# Patient Record
Sex: MALE | Race: WHITE | NOT HISPANIC OR LATINO | Employment: OTHER | URBAN - METROPOLITAN AREA
[De-identification: names, ages, dates, MRNs, and addresses within clinical notes are randomized per-mention and may not be internally consistent; named-entity substitution may affect disease eponyms.]

---

## 2019-03-04 ENCOUNTER — HOSPITAL ENCOUNTER (EMERGENCY)
Facility: HOSPITAL | Age: 54
Discharge: HOME | End: 2019-03-04
Attending: EMERGENCY MEDICINE | Admitting: EMERGENCY MEDICINE
Payer: MEDICARE

## 2019-03-04 ENCOUNTER — APPOINTMENT (EMERGENCY)
Dept: CT IMAGING | Facility: HOSPITAL | Age: 54
End: 2019-03-04
Payer: MEDICARE

## 2019-03-04 VITALS
OXYGEN SATURATION: 97 % | TEMPERATURE: 97.2 F | SYSTOLIC BLOOD PRESSURE: 123 MMHG | DIASTOLIC BLOOD PRESSURE: 67 MMHG | HEART RATE: 99 BPM | RESPIRATION RATE: 18 BRPM | WEIGHT: 201.94 LBS

## 2019-03-04 DIAGNOSIS — M54.2 ACUTE NECK PAIN: ICD-10-CM
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
## 2019-03-04 DIAGNOSIS — S09.90XA MINOR CLOSED HEAD INJURY: ICD-10-CM

## 2019-03-04 PROCEDURE — 70450 CT HEAD/BRAIN W/O DYE: CPT

## 2019-03-04 PROCEDURE — 72125 CT NECK SPINE W/O DYE: CPT

## 2019-03-04 PROCEDURE — 99284 EMERGENCY DEPT VISIT MOD MDM: CPT

## 2019-03-04 RX ORDER — OLANZAPINE 5 MG/1
5 TABLET ORAL
COMMUNITY
End: 2020-03-09

## 2019-03-04 RX ORDER — CLONAZEPAM 0.5 MG/1
0.5 TABLET ORAL
COMMUNITY
End: 2019-08-01

## 2019-03-04 RX ORDER — OLANZAPINE 10 MG/1
10 TABLET ORAL
COMMUNITY

## 2019-03-04 RX ORDER — OLANZAPINE 2.5 MG/1
5 TABLET ORAL
COMMUNITY
End: 2020-03-09

## 2019-03-04 RX ORDER — MIRTAZAPINE 7.5 MG/1
7.5 TABLET, FILM COATED ORAL
COMMUNITY
End: 2022-04-04

## 2019-03-04 RX ORDER — DULOXETIN HYDROCHLORIDE 60 MG/1
60 CAPSULE, DELAYED RELEASE ORAL DAILY
COMMUNITY
End: 2020-03-09

## 2019-03-04 RX ORDER — ACETAMINOPHEN 325 MG/1
650 TABLET ORAL EVERY 4 HOURS PRN
COMMUNITY

## 2019-03-04 RX ORDER — DESONIDE 0.5 MG/G
CREAM TOPICAL DAILY
COMMUNITY
End: 2020-03-09

## 2019-03-04 RX ORDER — CHLORPROMAZINE HYDROCHLORIDE 25 MG/1
200 TABLET, FILM COATED ORAL
COMMUNITY

## 2019-03-04 RX ORDER — CLONAZEPAM 0.5 MG/1
0.25 TABLET ORAL EVERY 12 HOURS
COMMUNITY
End: 2022-08-15

## 2019-03-04 RX ORDER — CICLOPIROX 1 G/100ML
SHAMPOO TOPICAL 3 TIMES WEEKLY
COMMUNITY

## 2019-03-04 NOTE — ED PROVIDER NOTES
History  Chief Complaint   Patient presents with   [facility] Fall     pt arrives via EMS post fall after trying to transfer from bed to wheel chair  pt hit head  no LOC     50-year-old male presents from [facility] for the evaluation of injuries that may have been sustained after a fall  The patient resides at [facility] and was transitioned to his wheelchair when he fell  Nursing assistant heard a thump and felt the patient on the ground  There is no reported loss of consciousness  There is no seizure activity  The patient is complaining of some mild head and neck pain  Nothing makes his symptoms better or worse  Prior to Admission Medications   Prescriptions Last Dose Informant Patient Reported? Taking?    Ciclopirox 1 % shampoo   Yes Yes   Sig: Apply topically 3 (three) times a week   DULoxetine (CYMBALTA) 60 mg delayed release capsule   Yes Yes   Sig: Take 60 mg by mouth daily   OLANZapine (ZyPREXA) 10 mg tablet   Yes Yes   Sig: Take 10 mg by mouth daily at bedtime   OLANZapine (ZyPREXA) 2 5 mg tablet   Yes Yes   Sig: Take 5 mg by mouth daily at bedtime   OLANZapine (ZyPREXA) 5 mg tablet   Yes Yes   Sig: Take 5 mg by mouth daily at bedtime   chlorproMAZINE (THORAZINE) 25 mg tablet   Yes Yes   Sig: Take 25 mg by mouth daily at bedtime   clonazePAM (KlonoPIN) 0 5 mg tablet   Yes Yes   Sig: Take 0 5 mg by mouth daily at bedtime 3 tablets at bedtime   clonazePAM (KlonoPIN) 0 5 mg tablet   Yes Yes   Sig: Take 0 5 mg by mouth daily at bedtime   desonide (DESOWEN) 0 05 % cream   Yes Yes   Sig: Apply topically daily Apply to face daily   mirtazapine (REMERON) 7 5 MG tablet   Yes Yes   Sig: Take 7 5 mg by mouth daily at bedtime      Facility-Administered Medications: None       Past Medical History:   Diagnosis Date    Ataxia     Bipolar 1 disorder ([facility] )     Depression     Diplopia     Dysarthria     Hypertension     Insomnia     Onychomycosis     Optic atrophy     [facility] )     Quadriplegia ([facility] )        History reviewed  No pertinent surgical history  History reviewed  No pertinent family history  I have reviewed and agree with the history as documented  Social History     Tobacco Use    Smoking status: Never Smoker   Substance Use Topics    Alcohol use: Not Currently     Frequency: Never    Drug use: Never        Review of Systems   Constitutional: Negative for chills and fever  Respiratory: Negative for shortness of breath  Cardiovascular: Negative for chest pain and palpitations  Musculoskeletal: Positive for neck pain  Neurological: Positive for headaches  Negative for dizziness, seizures and syncope  All other systems reviewed and are negative  Physical Exam  Physical Exam   Constitutional: He is oriented to person, place, and time  He appears well-developed and well-nourished  No distress  HENT:   Head: Normocephalic and atraumatic  Right Ear: External ear normal    Left Ear: External ear normal    Mouth/Throat: No oropharyngeal exudate  Eyes: Pupils are equal, round, and reactive to light  EOM are normal  No scleral icterus  Neck: Normal range of motion  Neck supple  Cardiovascular: Normal rate, regular rhythm and normal heart sounds  Pulmonary/Chest: Effort normal and breath sounds normal  No respiratory distress  Abdominal: Soft  Bowel sounds are normal  There is no tenderness  There is no rebound and no guarding  Neurological: He is alert and oriented to person, place, and time  He exhibits abnormal muscle tone  Chronic weakness from quadriplegia   Skin: Skin is warm and dry  No rash noted  Psychiatric: He has a normal mood and affect  Nursing note and vitals reviewed        Vital Signs  ED Triage Vitals [03/04/19 0301]   Temperature Pulse Respirations Blood Pressure SpO2   (!) 97 2 °F (36 2 °C) 68 18 117/66 97 %      Temp Source Heart Rate Source Patient Position - Orthostatic VS BP Location FiO2 (%) Temporal Monitor Lying Right arm --      Pain Score       2           Vitals:    03/04/19 0301   BP: 117/66   Pulse: 68   Patient Position - Orthostatic VS: Lying       Visual Acuity      ED Medications  Medications - No data to display    Diagnostic Studies  Results Reviewed     None                 CT cervical spine without contrast   Final Result by Mariana Huang MD (03/04 0350)      No cervical spine fracture or traumatic malalignment  Workstation performed: [id number]         CT head without contrast   Final Result by Mariana Huang MD (03/04 0347)   7 mm hypodensity in the right hemipons likely reflects volume inspiration however a developing infarct cannot be excluded and if indicated clinically, an MRI is recommended for further evaluation  No priors are available for comparison           Otherwise, no acute intracranial abnormality  I personally discussed this study with Murali Cameron on 3/4/2019 at 3:47 AM                      Workstation performed: [id number]                    Procedures  Procedures       Phone Contacts  ED Phone Contact    ED Course  ED Course as of Mar 04 0400   Mon Mar 04, 2019   7579 CT results noted  I do not clinically suspect stroke  Patient has no change in his baseline mental or physical exam   The plan is to return the patient to [facility] as there is no acute traumatic injury  MDM  Number of Diagnoses or Management Options  Accidental fall from bed, initial encounter: new and requires workup  Acute neck pain: new and requires workup  Minor closed head injury: new and requires workup  Diagnosis management comments: The plan is to obtain CTs of the head and cervical spine to rule out clinically significant traumatic injury  The patient (and any family present) verbalized understanding of the discharge instructions and warnings that would necessitate return to the Emergency Department      All questions were answered prior to discharge  Amount and/or Complexity of Data Reviewed  Tests in the radiology section of CPT®: ordered and reviewed  Review and summarize past medical records: yes  Discuss the patient with other providers: yes  Independent visualization of images, tracings, or specimens: yes        Disposition  Final diagnoses:   Accidental fall from bed, initial encounter   Minor closed head injury   Acute neck pain     Time reflects when diagnosis was documented in both MDM as applicable and the Disposition within this note     Time User Action Codes Description Comment    3/4/2019  3:59 AM Hermann Celaya B Add [W06  XXXA] Accidental fall from bed, initial encounter     3/4/2019  3:59 AM Marionette Malagasy B Add [S00 90XA] Minor closed head injury     3/4/2019  3:59 AM Marionette Malagasy B Add [M54 2] Acute neck pain       ED Disposition     ED Disposition Condition Date/Time Comment    Discharge Stable Mon Mar 4, 2019  3:59 AM Jasmyn Mills discharge to [facility]            Follow-up Information    None         Patient's Medications   Discharge Prescriptions    No medications on file     No discharge procedures on file      ED Provider  Electronically Signed by           Donna Magaña DO  03/04/19 0404

## 2019-07-02 ENCOUNTER — NURSING HOME VISIT (OUTPATIENT)
Dept: GERIATRICS | Facility: OTHER | Age: 54
End: 2019-07-02
Payer: MEDICARE

## 2019-07-02 DIAGNOSIS — B07.8 COMMON WART: Primary | ICD-10-CM

## 2019-07-02 PROBLEM — I46.9 CARDIAC ARREST WITH SUCCESSFUL RESUSCITATION (HCC): Status: ACTIVE | Noted: 2019-07-02

## 2019-07-02 PROBLEM — I10 ESSENTIAL HYPERTENSION: Status: ACTIVE | Noted: 2019-07-02

## 2019-07-02 PROBLEM — G32.81 CEREBELLAR ATAXIA IN DISEASES CLASSIFIED ELSEWHERE (HCC): Status: ACTIVE | Noted: 2019-07-02

## 2019-07-02 PROBLEM — Q04.8: Status: ACTIVE | Noted: 2019-07-02

## 2019-07-02 PROBLEM — G93.89: Status: ACTIVE | Noted: 2019-07-02

## 2019-07-02 PROBLEM — G93.1 CHRONIC ANOXIC ENCEPHALOPATHY (HCC): Status: ACTIVE | Noted: 2019-07-02

## 2019-07-02 PROBLEM — F31.12 BIPOLAR 1 DISORDER WITH MODERATE MANIA (HCC): Status: ACTIVE | Noted: 2019-07-02

## 2019-07-02 PROCEDURE — 99308 SBSQ NF CARE LOW MDM 20: CPT | Performed by: FAMILY MEDICINE

## 2019-07-02 NOTE — PROGRESS NOTES
[address]  Long Term      NAME: Arlene Meraz  AGE: 53 y o  SEX: male [phone]    DATE OF ENCOUNTER: 7/2/2019    Code status:  CPR    Assessment and Plan   Common wart  Second and 3rd PIP of right hand treated with compound W maximum Strength daily hold if lesion becomes painful      All medications and routine orders were reviewed and updated as needed        Chief Complaint     Seen for lesions on the right 2nd and 3rd knuckles patient states that he has had these for a long time but has decided that there are new since and wants them removed    Objective     Vital signs none:      PHYSICAL EXAM:  GENERAL: no acute distress,  SKIN: warm, dry, no rash, no cyanosis, right hand 2nd and 3rd DD AP have a common wart with the black vesicles seen there are large in thickened

## 2019-07-02 NOTE — ASSESSMENT & PLAN NOTE
Second and 3rd PIP of right hand treated with compound W maximum Strength daily hold if lesion becomes painful

## 2019-08-01 ENCOUNTER — NURSING HOME VISIT (OUTPATIENT)
Dept: GERIATRICS | Facility: OTHER | Age: 54
End: 2019-08-01
Payer: MEDICARE

## 2019-08-01 DIAGNOSIS — F31.12 BIPOLAR 1 DISORDER WITH MODERATE MANIA (HCC): Primary | ICD-10-CM

## 2019-08-01 PROCEDURE — 99309 SBSQ NF CARE MODERATE MDM 30: CPT | Performed by: FAMILY MEDICINE

## 2019-08-01 NOTE — PROGRESS NOTES
[address]  Long Term      NAME: Simeon Greenberg  AGE: 53 y o  SEX: male [id number]    DATE OF ENCOUNTER: 8/1/2019    Code status:  CPR    Assessment and Plan   Chronic anoxic encephalopathy ([facility] [address] )  No change in mental status    Bipolar 1 disorder with moderate tavo ([facility] )  Well controlled with the addition of Depakote an chlorpromazine and clonazepam, duloxetine, Olanzapine per Psychiatry    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 [facility])  Wheelchair bound unable to walk    Essential hypertension  93/58 while his antihypertensive have been weaned  Common wart  Improving with compound W 17% , have asked him to use nail file to remove some of the callous      All medications and routine orders were reviewed and updated as needed  Chief Complaint     Patient is seen for his regular 2 month check with review of his medications and any new complaints that the patient may have  At this time he has no complaints his bipolar 1 with tavo has resolved with the addition of the chlorpromazine and the valproic acid  He is presently treating her warts on the 2nd and 3rd fingers of his right hand which are improving with compound W 17%  He has no other complaints , sleeping is still desturb per patient but when monitor show adequate sleep  Historical Information   Past Medical History:   Diagnosis Date    Ataxia     Bipolar 1 disorder ([facility] )     Depression     Diplopia     Dysarthria     Hypertension     Insomnia     Onychomycosis     Optic atrophy     [facility]     Quadriplegia ([facility] )      No past surgical history on file  Social History   No family history on file  Review of Systems     Review of Systems   Constitutional: Negative for activity change, chills, fatigue, fever and unexpected weight change  HENT: Negative  Eyes: Negative  Respiratory: Negative  Cardiovascular: Negative  Gastrointestinal: Negative      Endocrine: Negative  Genitourinary: Negative  Musculoskeletal: Negative for arthralgias, back pain, gait problem, joint swelling, myalgias and neck pain  Neurological: Negative for syncope, facial asymmetry, speech difficulty, light-headedness and headaches  Hematological: Negative  Psychiatric/Behavioral: Negative for agitation, behavioral problems, confusion, decreased concentration, dysphoric mood, hallucinations, self-injury, sleep disturbance and suicidal ideas  The patient is not nervous/anxious and is not hyperactive  Objective     Vital signs:  BP: 93/58   HR: 94  RR: 16  TEMP: 97 8  SAT  : 96% RA    PHYSICAL EXAM:  GENERAL: no acute distress,flat affect, soft spoken and mild dysarthria   SKIN: warm, dry, no rash, no cyanosis  HEENT: normocephalic, atraumatic, no JVD, no Thyromegaly, no lymphadenopathy  LUNGS: CTA, no wheezing, no rales, expanded equally, no chest tenderness   HEART: normal rhythm, normal rate, no murmur, no gallop  ABDOMEN: soft non tender non distended bs+, no guarding or rebound tenderness  : no suprapubic tenderness  MUSCULOSKELETAL: can self propel wheelchair with arms and some use of legs  strength about 4+/5 upper extremities, no edema  NEUROLOGY: awake, alert, responding tto question,, good ye contact CN2-12 intact  PSYCH: no complaint of depression, pleasant  ? Pertinent Laboratory/Diagnostic Studies: The following labs/studies were reviewed please see chart or hospital paperwork for details

## 2019-08-01 NOTE — ASSESSMENT & PLAN NOTE
Well controlled with the addition of Depakote an chlorpromazine and clonazepam, duloxetine, Olanzapine per Psychiatry

## 2019-09-23 ENCOUNTER — NURSING HOME VISIT (OUTPATIENT)
Dept: GERIATRICS | Facility: OTHER | Age: 54
End: 2019-09-23
Payer: MEDICARE

## 2019-09-23 DIAGNOSIS — B07.8 COMMON WART: Primary | ICD-10-CM

## 2019-09-23 PROCEDURE — 99309 SBSQ NF CARE MODERATE MDM 30: CPT | Performed by: FAMILY MEDICINE

## 2019-09-23 NOTE — ASSESSMENT & PLAN NOTE
Well controlled, no tavo episodes and no behavior issues maintained on chlorpromazine,clonazepam,duloxitaine, mirtazapine,onlazepine ,trazodone and Valproic acid per psychiary

## 2019-09-23 NOTE — ASSESSMENT & PLAN NOTE
Has large callous trimmed down to see thee vascular component which was cauterized with silver nitrate , can start reapplying salicylic acid

## 2019-09-23 NOTE — PROGRESS NOTES
[address]  Long Term      NAME: Vasu Bullock  AGE: 53 y o  SEX: male [phone]    DATE OF ENCOUNTER: 9/23/2019    Code status:  CPR    Assessment and Plan   Common wart  Has large callous trimmed down to see thee vascular component which was cauterized with silver nitrate , can start reapplying salicylic acid    Bipolar 1 disorder with moderate tavo ([address] )  Well controlled, no tavo episodes and no behavior issues maintained on chlorpromazine,clonazepam,duloxitaine, mirtazapine,onlazepine ,trazodone and Valproic acid per psychiary    Essential hypertension  Controlled on no medications    Chronic anoxic encephalopathy (HCC)  No change in behavior,or memory issues    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 ([address] )  Still with good strength of 5/5 in upper extremities but with cerebellar  Sign with inability to coordinate movement  Similiarly with leg,good strength but ataxic severely      All medications and routine orders were reviewed and updated as needed  Chief Complaint     Patient was seen for 2 month routine read 8 and med review  He has a wart is been treated and 1 is almost resolved and the 2nd 1 has grown significantly and has agreed to shaving of that lesion so the compound W can be applied  No new complaints        Historical Information   Past Medical History:   Diagnosis Date    Ataxia     Bipolar 1 disorder ([address] )     Depression     Diplopia     Dysarthria     Hypertension     Insomnia     Onychomycosis     Optic atrophy     PVD (peripheral vascular disease) ([facility] )     Quadriplegia ([facility] [facility] )      No past surgical history on file  Social History   No family history on file  Review of Systems     Review of Systems   Constitutional: Negative for activity change, chills, fatigue, fever and unexpected weight change  HENT: Negative  Respiratory: Negative  Cardiovascular: Negative  Gastrointestinal: Negative  Genitourinary: Negative  Musculoskeletal: Negative for arthralgias, back pain, gait problem, joint swelling, myalgias and neck pain  Neurological: Negative for syncope, facial asymmetry, speech difficulty, light-headedness and headaches  Psychiatric/Behavioral: Negative for agitation, behavioral problems, confusion, decreased concentration, dysphoric mood and sleep disturbance  The patient is not nervous/anxious  Objective     Vital signs:  BP: 108/70  HR: 93  RR: 20  TEMP: 99 1  SAT  : 96% RA    PHYSICAL EXAM:  GENERAL: no acute distress, propels self in chir with legs  SKIN: warm, dry, no rash, no cyanosis  HEENT: normocephalic, atraumatic, no JVD, no Thyromegaly, no lymphadenopathy  LUNGS: CTA, no wheezing, no rales, expanded equally, no chest tenderness   HEART: normal rhythm, normal rate, no murmur, no gallop  ABDOMEN: soft non tender non distended bs+, no guarding or rebound tenderness  : no suprapubic tenderness  MUSCULOSKELETAL: strength about 5 including +/5 upper extremities, no edema, second digit right was shaved down until bleeding from wart tumor and cauterized with silver nitrate  NEUROLOGY: awake, alert, responding to question good eye contact  CN2-12 intact, dysarthria  PSYCH: no complaint of depression, pleasant  ? Pertinent Laboratory/Diagnostic Studies: The following labs/studies were reviewed please see chart or hospital paperwork for details

## 2019-09-23 NOTE — ASSESSMENT & PLAN NOTE
Still with good strength of 5/5 in upper extremities but with cerebellar  Sign with inability to coordinate movement   Similiarly with leg,good strength but ataxic severely

## 2019-10-18 ENCOUNTER — NURSING HOME VISIT (OUTPATIENT)
Dept: GERIATRICS | Facility: OTHER | Age: 54
End: 2019-10-18
Payer: MEDICARE

## 2019-10-18 DIAGNOSIS — G32.81 CEREBELLAR ATAXIA IN DISEASES CLASSIFIED ELSEWHERE (HCC): Primary | ICD-10-CM

## 2019-10-18 PROCEDURE — 99309 SBSQ NF CARE MODERATE MDM 30: CPT | Performed by: FAMILY MEDICINE

## 2019-10-18 NOTE — PROGRESS NOTES
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)  Currently causing nystagmus which occurred with anoxic injury post cardiac arrest after an attempted suicide many year ago plus/minus his 7 mm hypodense lesion in right lizett-mauro Will check BMP and CBC with diff      All medications and routine orders were reviewed and updated as needed  Chief Complaint   Patient is seen over his concerned about his nystagmus that he is having to day  He is known to have nystagmus in the past secondary to his anoxic brain injury in these cerebellar area secondary to a cardiac arrest secondary to a suicidal attempt  Recently had a CT scan of his head which additionally had showed a 7 mm hypo density in the right lizett- mauro  He denies seizure activity, excessive tiredness despite his chronic c/o of Insomnia despite normal duration in all stages of sleep for age  He denies feeling ill, sore throat cough or urinary symptoms          Historical Information   Past Medical History:   Diagnosis Date    Ataxia     Bipolar 1 disorder ([address] )     Depression     Diplopia     Dysarthria     Hypertension     Insomnia     Onychomycosis     Optic atrophy     PVD (peripheral vascular disease) ([address] )     Quadriplegia ([address] )      History reviewed  No pertinent surgical history  Social History   History reviewed  No pertinent family history      Review of Systems     Review of Systems   Constitutional: Negative for activity change, chills, fatigue, fever and unexpected weight change  HENT: Negative  Eyes: Positive for redness (B/L) and visual disturbance (secondary to nystagmus)  Negative for photophobia, pain and itching  Respiratory: Negative  Cardiovascular: Negative  Gastrointestinal: Negative  Endocrine: Negative  Genitourinary: Negative  Musculoskeletal: Positive for gait problem (Ataxic)  Negative for arthralgias, back pain, joint swelling, myalgias and neck pain  Neurological: Negative for syncope, facial asymmetry, speech difficulty, light-headedness and headaches  Psychiatric/Behavioral: Negative for agitation, behavioral problems, confusion, decreased concentration, dysphoric mood and sleep disturbance  The patient is not nervous/anxious  Objective     Vital signs:  BP: 135/67  HR: 78  RR: 16  TEMP: 97 9  SAT  : 95% RA    PHYSICAL EXAM:  GENERAL: no acute distrebvious rapid lateral nystagmus B/L without rotatory component   SKIN: warm, dry, no rash, no cyanosis, quite voice,   HEENT: normocephalic, atraumatic, no JVD, no Thyromegaly, no lymphadenopathy, conjunctival erythemaLUNGS: CTA, no wheezing, no rales, expanded equally, no chest tenderness   HEART: normal rhythm, normal rate, no murmur, no gallop  ABDOMEN: soft non tender non distended bs+, no guarding or rebound tenderness  : no suprapubic tenderness  MUSCULOSKELETAL: strength about 3+/5 upper extremities,propells wheelchair with feet, no edema  NEUROLOGY: awake, alert, responding tto question post seixure  CN2-12 intact  PSYCH: no complaint of depression, pleasant  ? Pertinent Laboratory/Diagnostic Studies: The following labs/studies were reviewed please see chart or hospital paperwork for details

## 2019-10-18 NOTE — ASSESSMENT & PLAN NOTE
Probably source od nystagmus bit has had nystagmus fron the past per staff associated with stress, excessive sleep deprivation,illness and not eating combined with his cerebellar disease of anoxic damage with cardiac arrest from attempted suicide

## 2019-10-18 NOTE — ASSESSMENT & PLAN NOTE
Currently causing nystagmus which occurred with anoxic injury post cardiac arrest after an attempted suicide many year ago plus/minus his 7 mm hypodense lesion in right lizett-mauro Will check BMP and CBC with diff

## 2019-11-10 ENCOUNTER — TELEPHONE (OUTPATIENT)
Dept: OTHER | Facility: OTHER | Age: 54
End: 2019-11-10

## 2019-11-11 ENCOUNTER — NURSING HOME VISIT (OUTPATIENT)
Dept: GERIATRICS | Facility: OTHER | Age: 54
End: 2019-11-11
Payer: MEDICARE

## 2019-11-11 DIAGNOSIS — J00 NASOPHARYNGITIS ACUTE: Primary | ICD-10-CM

## 2019-11-11 PROCEDURE — 99309 SBSQ NF CARE MODERATE MDM 30: CPT | Performed by: FAMILY MEDICINE

## 2019-11-11 NOTE — ASSESSMENT & PLAN NOTE
Fall yesterday getting out of bed landed on his buttock and then struck his left parietal area on the nightstand was not knocked out no loss of consciousness feels fine today we can DC the neuro checks ordered by the on call physician last night

## 2019-11-11 NOTE — ASSESSMENT & PLAN NOTE
Seven other residents on the same floor have similar symptoms will treat symptomatically with Tylenol and Robitussin DM viral nasal swab as part of infection control to assure similar cases

## 2019-11-12 ENCOUNTER — TELEPHONE (OUTPATIENT)
Dept: OTHER | Facility: OTHER | Age: 54
End: 2019-11-12

## 2019-11-13 NOTE — TELEPHONE ENCOUNTER
Christoph Text Dr Pierce Coyne @ [phone]    Actual text: Flu swab positive for para influenza 3  Informed called to call back if no response in 20 minutes

## 2019-11-19 ENCOUNTER — NURSING HOME VISIT (OUTPATIENT)
Dept: GERIATRICS | Facility: OTHER | Age: 54
End: 2019-11-19
Payer: MEDICARE

## 2019-11-19 DIAGNOSIS — B34.8 INFECTION DUE TO PARAINFLUENZA VIRUS 3: Primary | ICD-10-CM

## 2019-11-19 PROCEDURE — 99309 SBSQ NF CARE MODERATE MDM 30: CPT | Performed by: FAMILY MEDICINE

## 2019-11-20 NOTE — ASSESSMENT & PLAN NOTE
Patient is seen in follow-up of his parainfluenza 3 nasal pharyngitis his cough is still present but much reduced his upper airway secretions have decreased markedly has good appetite

## 2019-11-20 NOTE — ASSESSMENT & PLAN NOTE
Secondary to his anoxic brain injury due to an attempted suicide and subsequent cardiac arrest significant ataxia with upper extremity cerebellar movements

## 2019-11-20 NOTE — ASSESSMENT & PLAN NOTE
Stable at present denies hallucinations delusions and no sign of tavo will maintain on his chlorpromazine Olanzapine,mirtazepine and duloxitine

## 2019-11-20 NOTE — PROGRESS NOTES
[address]  Long Term      NAME: Silvia Cameron  AGE: 53 y o  SEX: male [phone]    DATE OF ENCOUNTER: 11/19/2019    Code status:  CPR    Assessment and Plan   Infection due to parainfluenza virus 3  Patient is seen in follow-up of his parainfluenza 3 nasal pharyngitis his cough is still present but much reduced his upper airway secretions have decreased markedly has good appetite    Bipolar 1 disorder with moderate tavo (HCC)  Stable at present denies hallucinations delusions and no sign of tavo will maintain on his chlorpromazine Olanzapine,mirtazepine and duloxitine    Brainstem dysfunction  Secondary to his anoxic brain injury due to an attempted suicide and subsequent cardiac arrest significant ataxia with upper extremity cerebellar movements    Chronic anoxic encephalopathy (HCC)  No change in his dysarthria, memory loss or movement disorder      All medications and routine orders were reviewed and updated as needed  Chief Complaint     Patient is seen for follow-up of his parainfluenza of virus infection approximately 11/11/2019  As there was an outbreak on the 3rd floor the regular Center  He says he is markedly improved still has an occasional cough and occasional wet secretion but has good appetite no nausea vomiting fevers or chills    Patient is also seen for his 2 month evaluation, review of his medications, and any new complaints that he might have  The patient says he has no complaints he is feeling good and the nursing staff say that he is back to his normal condition  he has dysarthria and is sometime difficult to understand        Historical Information   Past Medical History:   Diagnosis Date    Ataxia     Bipolar 1 disorder ([facility] )     Depression     Diplopia     Dysarthria     Hypertension     Insomnia     Onychomycosis     Optic atrophy     PVD (peripheral vascular disease) ([facility] )     Quadriplegia ([facility] )      No past surgical history on file    Social History No family history on file  Review of Systems     Review of Systems   Constitutional: Negative for activity change, appetite change, chills, diaphoresis, fatigue, fever and unexpected weight change  HENT: Positive for congestion (mild)  Negative for drooling, postnasal drip, rhinorrhea, sinus pressure, sinus pain, sneezing, sore throat, tinnitus, trouble swallowing and voice change  Eyes: Positive for visual disturbance (chronic seondary to anoxic encephalopathy)  Respiratory: Positive for cough  Negative for choking, chest tightness, shortness of breath, wheezing and stridor  Cardiovascular: Negative  Gastrointestinal: Negative  Musculoskeletal: Negative for arthralgias, back pain, gait problem, joint swelling, myalgias and neck pain  Neurological: Positive for tremors (upper extremities) and weakness (legs with ataxia)  Negative for syncope, facial asymmetry, speech difficulty, light-headedness and headaches  Hematological: Negative  Psychiatric/Behavioral: Negative for agitation, behavioral problems, confusion, decreased concentration, dysphoric mood, hallucinations (also no delusions), self-injury, sleep disturbance and suicidal ideas  The patient is nervous/anxious  The patient is not hyperactive  Objective     Vital signs:  BP: 116/77  HR: 65  RR: 16  TEMP: 97 4  SAT  : 98%RA    PHYSICAL EXAM:  GENERAL: no acute distress, no cough during exam,some upper airway sounds  SKIN: warm, dry, no rash, no cyanosis  HEENT: normocephalic, atraumatic, no JVD, no Thyromegaly, no lymphadenopathy  LUNGS: CTA, no wheezing, no rales, expanded equally, no chest tenderness   HEART: normal rhythm, normal rate, no murmur, no gallop  ABDOMEN: soft non tender non distended bs+, no guarding or rebound tenderness  : no suprapubic tenderness  MUSCULOSKELETAL: strength about 3+/5 upper extremities,tremors and abnormal uncoordinated movements no edema  NEUROLOGY: awake, alert, responding tto question, dysarthria  CN2-12 intact  PSYCH: no complaint of depression, pleasant, no hyperactive speech  ? Pertinent Laboratory/Diagnostic Studies: The following labs/studies were reviewed please see chart or hospital paperwork for details

## 2019-12-03 ENCOUNTER — NURSING HOME VISIT (OUTPATIENT)
Dept: GERIATRICS | Facility: OTHER | Age: 54
End: 2019-12-03
Payer: MEDICARE

## 2019-12-03 DIAGNOSIS — M54.6 ACUTE RIGHT-SIDED THORACIC BACK PAIN: Primary | ICD-10-CM

## 2019-12-03 PROCEDURE — 99308 SBSQ NF CARE LOW MDM 20: CPT | Performed by: FAMILY MEDICINE

## 2019-12-03 NOTE — ASSESSMENT & PLAN NOTE
Right thoracic chest wall pain in the mid scapular region    Clear etiology cannot be delineated no pain with chest wall compression but to localize palpation no trigger point is detected it is relieved by Tylenol

## 2019-12-03 NOTE — PROGRESS NOTES
[address]  Long Term      NAME: Ashley López  AGE: 53 y o  SEX: male [phone]    DATE OF ENCOUNTER: 12/3/2019    Code status:  CPR    Assessment and Plan   Thoracic back pain  Right thoracic chest wall pain in the mid scapular region  Clear etiology cannot be delineated no pain with chest wall compression but to localize palpation no trigger point is detected it is relieved by Tylenol      All medications and routine orders were reviewed and updated as needed  Chief Complaint     Patient stops me in the hallway to complain of right midthoracic back pain denies trauma thinks it is attributable to his spine during his injury to his head  It does not change with movement is relieved by Tylenol does keep him awake at night but no problem in day time  Does not want to apply a salonpas to his back will continue to take Tylenol as needed  Suggested to the patient that he take Tylenol 650 mg at HS to help with the pain which he agrees to        Historical Information   Past Medical History:   Diagnosis Date    Ataxia     Bipolar 1 disorder ([facility] )     Depression     Diplopia     Dysarthria     Hypertension     Insomnia     Onychomycosis     Optic atrophy     [facility]     Quadriplegia ([facility] )      No past surgical history on file  Social History   No family history on file      Review of Systems     Review of Systems   Unable to perform ROS: Other     Minor complaint no further review of systems  Objective     Vital signs:  PHYSICAL EXAM:  GENERAL: no acute distress,,self propels wheelchair without back pain, severe dysarthria   SKIN: warm, dry, no rash, no cyanosis  HEENT: normocephalic, atraumatic, no JVD, no Thyromegaly, no lymphadenopathy  LUNGS: CTA, no wheezing, no rales, expanded equally, no chest tenderness   HEART: normal rhythm, normal rate, no murmur, no gallop  ABDOMEN: soft non tender non distended bs+, no guarding or rebound tenderness  : no suprapubic tenderness  MUSCULOSKELETAL: strength about 4+/5 upper extremities,  Tender mid-scapular line llow thoracic approximatelyT 8-9 no increased pain to compression, no trigger point? Pertinent Laboratory/Diagnostic Studies: The following labs/studies were reviewed please see chart or hospital paperwork for details

## 2020-01-02 ENCOUNTER — NURSING HOME VISIT (OUTPATIENT)
Dept: GERIATRICS | Facility: OTHER | Age: 55
End: 2020-01-02
Payer: MEDICARE

## 2020-01-02 DIAGNOSIS — J00 ACUTE NASOPHARYNGITIS: Primary | ICD-10-CM

## 2020-01-02 PROCEDURE — 99308 SBSQ NF CARE LOW MDM 20: CPT | Performed by: NURSE PRACTITIONER

## 2020-01-02 NOTE — PROGRESS NOTES
[address] [facility])  [facility], [address] [facility], [address]  Progress note      Chief complaints/ Reason for visit: Acute visit ([facility])    Assessment/Plan:     1 ) Acute nasopharyngitis  - Will order Robitussin DM 10ml Q12 hours x 5 days  - HOLD Robitussin DM PRN while on the scheduled dose  - Fexofenadine 60mg 1 tablet daily in AM x 7 days  - Do Rapid Flu if patient develops fever of T101 0F or symptoms worsen  - V/S Q shift x 5 days   - Encourage fluids      Subjective: " I just feel blah"  Patient ID: Kera Zuniga is a 53 y o  male  This is a 53 y o male patient admitted at [facility] for debility  Patient is seen and examined today per nursing request for report of not feeling well and nasal congestion today  Patient is OOB, alert, oriented x3, verbal with clear coherent speech  Reported : feeling blah" and non -productive coughing when in bed since yesterday  Denies rhinorrhea, chest pain, SOB, malaise, chills or fever  VSS: T98 1F-P93-R19 SpO2: 98% RA  Examination found slight erythema to nasal mucosa, ears clear and within normal limits  Diminished breath sounds on the Right side otherwise LTCA  Will manage conservatively for now: Acute Nasopharyngitis  Nursing to do rapid flu if patient develops fever T101 0F  Laboratory results reviewed: No new laboratory results to review at this time    Medication: Reviewed and updated  Allergy: NKDA  Review of Systems   Constitutional: Negative  HENT: Positive for congestion and postnasal drip  Negative for drooling, ear discharge, ear pain, hearing loss, rhinorrhea, sinus pressure, sore throat, trouble swallowing and voice change  Eyes: Negative  Respiratory: Negative  Cardiovascular: Negative  Gastrointestinal: Negative  Endocrine: Negative  Genitourinary: Negative  Musculoskeletal: Negative  Skin: Negative  Allergic/Immunologic: Negative  Neurological: Negative  Hematological: Negative  Psychiatric/Behavioral: Negative  Objective:    V/S:  T98 1F-P93-R19 SpO2: 98%        Physical Exam   Constitutional: He is oriented to person, place, and time  He appears well-developed and well-nourished  No distress  Alert, cooperative  HENT:   Head: Normocephalic and atraumatic  Right Ear: External ear normal    Left Ear: External ear normal    Mouth/Throat: Oropharynx is clear and moist  No oropharyngeal exudate  Slight nasal erythema - no rhinorrhea  Eyes: Pupils are equal, round, and reactive to light  Conjunctivae are normal  Right eye exhibits no discharge  Left eye exhibits no discharge  Neck: Neck supple  No JVD present  No tracheal deviation present  No thyromegaly present  Cardiovascular: Regular rhythm and normal heart sounds  Exam reveals no gallop and no friction rub  No murmur heard  Slight tachycardia   Pulmonary/Chest: Effort normal and breath sounds normal  No stridor  No respiratory distress  He has no wheezes  He has no rales  He exhibits no tenderness  Diminished breath sounds to Right side only  Abdominal: Soft  Bowel sounds are normal  He exhibits no distension and no mass  There is no tenderness  There is no rebound and no guarding  Musculoskeletal: He exhibits no edema, tenderness or deformity  Uses manual wheel chair - self propel  Lymphadenopathy:     He has no cervical adenopathy  Neurological: He is alert and oriented to person, place, and time  Skin: Skin is warm and dry  No rash noted  He is not diaphoretic  No erythema             ADAM Ludwig  1/2/2020

## 2020-01-13 ENCOUNTER — NURSING HOME VISIT (OUTPATIENT)
Dept: GERIATRICS | Facility: OTHER | Age: 55
End: 2020-01-13
Payer: MEDICARE

## 2020-01-13 DIAGNOSIS — G32.81 CEREBELLAR ATAXIA IN DISEASES CLASSIFIED ELSEWHERE (HCC): ICD-10-CM

## 2020-01-13 DIAGNOSIS — F31.12 BIPOLAR 1 DISORDER WITH MODERATE MANIA (HCC): ICD-10-CM

## 2020-01-13 DIAGNOSIS — G47.59 OTHER PARASOMNIA: ICD-10-CM

## 2020-01-13 DIAGNOSIS — G93.1 CHRONIC ANOXIC ENCEPHALOPATHY (HCC): Primary | ICD-10-CM

## 2020-01-13 PROBLEM — J00 NASOPHARYNGITIS ACUTE: Status: RESOLVED | Noted: 2019-11-11 | Resolved: 2020-01-13

## 2020-01-13 PROBLEM — B07.8 COMMON WART: Status: RESOLVED | Noted: 2019-07-02 | Resolved: 2020-01-13

## 2020-01-13 PROBLEM — B34.8 INFECTION DUE TO PARAINFLUENZA VIRUS 3: Status: RESOLVED | Noted: 2019-11-19 | Resolved: 2020-01-13

## 2020-01-13 PROCEDURE — 99309 SBSQ NF CARE MODERATE MDM 30: CPT | Performed by: NURSE PRACTITIONER

## 2020-01-13 NOTE — ASSESSMENT & PLAN NOTE
Followed by [facility] (Psychology office)  Mood and Behavior stable on this visit  Continue the following meds:  * Chlorpromazine at HS  * Clonazepam 0 5mg BID  * Duloxetine BID  * Mirtazapine 7 5mg at HS  * Trazodone 100mg at HS  * Olanzapine at 5PM  * Valproic acid 250mg BID  Check CMP and CBC w/o diff on 1/21/2020    Check Valproic acid level 1/21/2020

## 2020-01-13 NOTE — ASSESSMENT & PLAN NOTE
Recurrent fall incident  Nursing to continue fall precaution  Continue PT/OT as needed    Self propel using manual wheel chair

## 2020-01-13 NOTE — PROGRESS NOTES
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 [facility])  Recurrent fall incident  Nursing to continue fall precaution  Continue PT/OT as needed  Self propel using manual wheel chair    Bipolar 1 disorder with moderate tavo ([facility] )  Followed by Alfredo Sung (Psychology office)  Mood and Behavior stable on this visit  Continue the following meds:  * Chlorpromazine at HS  * Clonazepam 0 5mg BID  * Duloxetine BID  * Mirtazapine 7 5mg at HS  * Trazodone 100mg at HS  * Olanzapine at 5PM  * Valproic acid 250mg BID  Check CMP and CBC w/o diff on 1/21/2020  Check Valproic acid level 1/21/2020    Other parasomnia  Continue Mirtazapine 7 5mg at HS  Continue Trazodone 100mg at HS  Continue routine sleep hygience    Chief complaint / Reason for visit: Routine Follow-up visit for chronic medical conditions ([facility])    History of Present Illness: This is a 53 y o  Male patient admitted at [facility] for AnoxicToxic Encephalopathy with Cerebellar ataxic quadriplegia  Patient is seen and examined today as a routine follow-up of chronic and acute medical conditions as mentioned above and Bipolar Disorder, parasomnia  Patient is OOB, groomed, alert, oriented x4, able to recite days of the week forward and backward without problems, cooperative, calm and in good spirits - verbal with slow halting speech but coherent  Denies any acute medical concerns for this visit including new or worsening pain, chest pain, SOB, GI/ related concerns, dizziness/ vertigo, headache, malaise, fever, chills or cough and cold symptoms  Requested clarification for meds - questions answered and agreed   Reported mild sensation of dry mouth on this visit - felt that it may be because of his meds - OK with Biotene mouth spray  Patient currently seeing [facility] (Psychology) and manage depression and anxiety medications - per coordinator, Psych office (Dr Zackary [place]) prefers to adjust and manage medications while under his care  PHQ2: 0 (Negative) on this visit  Recent labs reviewed (CMP, CBC w/ diff: 10/18/2019) = WNL; Valproic acid level (4/23/19) = WNL  Review of Systems:  Per history of present illness, all other systems reviewed and negative  HISTORY:  Medical Hx: Reviewed, unchanged  Family Hx: Reviewed, unchanged  Soc Hx: Reviewed,  Unchanged    ALLERGY: No Known Allergies    PHYSICAL EXAM:  Vital Signs: T97 4F -P85-R16  BP: 115/78 SpO2: 100% RA  Weight: 87 6 kgs (1/1/20) <= 87 5 kgs (12/1/19)    General: NAD  Head: Normocephalic, Atraumatic  Eye Exam: anicteric sclera, no discharge  No injection  Wears glasses  Ears: TMs' intact and free of effusion, erythema and cerumen   Nose: Slight erythema nasal turbinates but no rhinorrhea  Oral Exam: moist mucous membranes  No exudate seen  Tongue pink and moist   Neck Exam: no anterior cervical lymphadenopathy noted, neck supple  Cardiovascular: regular rate, regular rhythm, no murmurs, rubs, or gallops  Pulmonary: no wheeze, no rhonchi  LCTA  No chest tenderness  Abdominal: soft, non-tender, nondistended, bowel sounds audible x4 Q  : Non distended bladder  Extremities and skin: no edema noted, no rashes  Ambulatory dysfunction  Normal ROM for BLE/BUE  Neurological: alert and responsive, moving all 4 extremities symmetrically      Laboratory results / Imaging: Hard copies in medical chart:  * CMP (10/18/19) = WNL  * CBC w/ diff (10/18/19) = WNL  * Valproic acid (4/23/19) = 52 (WNL)      Current Medications:   All medications reviewed and updated in [address] [place] [address],  [id number]  1/13/2020

## 2020-03-09 ENCOUNTER — NURSING HOME VISIT (OUTPATIENT)
Dept: GERIATRICS | Facility: OTHER | Age: 55
End: 2020-03-09
Payer: MEDICARE

## 2020-03-09 DIAGNOSIS — M54.9 UPPER BACK PAIN, CHRONIC: ICD-10-CM

## 2020-03-09 DIAGNOSIS — K59.01 SLOW TRANSIT CONSTIPATION: ICD-10-CM

## 2020-03-09 DIAGNOSIS — F31.12 BIPOLAR 1 DISORDER WITH MODERATE MANIA (HCC): Chronic | ICD-10-CM

## 2020-03-09 DIAGNOSIS — G32.81 CEREBELLAR ATAXIA IN DISEASES CLASSIFIED ELSEWHERE (HCC): Primary | Chronic | ICD-10-CM

## 2020-03-09 DIAGNOSIS — Z79.899 POLYPHARMACY: ICD-10-CM

## 2020-03-09 DIAGNOSIS — G89.29 UPPER BACK PAIN, CHRONIC: ICD-10-CM

## 2020-03-09 PROBLEM — G82.50 QUADRIPLEGIA (HCC): Chronic | Status: ACTIVE | Noted: 2020-03-09

## 2020-03-09 PROBLEM — G93.89: Status: RESOLVED | Noted: 2019-07-02 | Resolved: 2020-03-09

## 2020-03-09 PROBLEM — G82.50 QUADRIPLEGIA (HCC): Status: ACTIVE | Noted: 2020-03-09

## 2020-03-09 PROBLEM — M54.6 THORACIC BACK PAIN: Status: RESOLVED | Noted: 2019-12-03 | Resolved: 2020-03-09

## 2020-03-09 PROBLEM — I10 ESSENTIAL HYPERTENSION: Status: RESOLVED | Noted: 2019-07-02 | Resolved: 2020-03-09

## 2020-03-09 PROBLEM — G93.1: Chronic | Status: ACTIVE | Noted: 2019-07-02

## 2020-03-09 PROBLEM — Q04.8: Status: RESOLVED | Noted: 2019-07-02 | Resolved: 2020-03-09

## 2020-03-09 PROBLEM — I46.9 CARDIAC ARREST WITH SUCCESSFUL RESUSCITATION (HCC): Status: RESOLVED | Noted: 2019-07-02 | Resolved: 2020-03-09

## 2020-03-09 PROCEDURE — 99309 SBSQ NF CARE MODERATE MDM 30: CPT | Performed by: INTERNAL MEDICINE

## 2020-03-09 RX ORDER — DULOXETIN HYDROCHLORIDE 60 MG/1
60 CAPSULE, DELAYED RELEASE ORAL 2 TIMES DAILY
COMMUNITY

## 2020-03-09 RX ORDER — VALPROIC ACID 250 MG/1
250 CAPSULE, LIQUID FILLED ORAL EVERY MORNING
COMMUNITY

## 2020-03-09 NOTE — ASSESSMENT & PLAN NOTE
Continue to follow psychiatry with chlorpromazine, clonazepam, duloxetine, mirtazapine, olanzapine, valproic acid

## 2020-03-09 NOTE — LETTER
March 9, 2020     [facility]    Patient: Glenn Waterman   YOB: 1965   Date of Visit: 3/9/2020       Dear Dr Margoth Bustos: Thank you for referring Phoebe Fleming to me for evaluation  Below are my notes for this consultation  If you have questions, please do not hesitate to call me  I look forward to following your patient along with you  Sincerely,        René Lr MD        CC: No Recipients  René Lr MD  3/9/2020  2:38 PM  Sign at close encounter  [address] [facility] [phone]  Location: [facility]  [facility]  POS: LT    NAME: Glenn Waterman  AGE: 53 y o  SEX: male    DATE OF ENCOUNTER: 3/9/2020    ASSESSMENT AND PROBLEMS:  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 [facility])  Continue supportive care  Continue wheelchair    Bipolar 1 disorder with moderate tavo ([facility] [address] )  Continue to follow psychiatry with chlorpromazine, clonazepam, duloxetine, mirtazapine, olanzapine, valproic acid    Slow transit constipation  Continue docusate twice a day    Upper back pain, chronic  Seem somewhat improved  Stop Tylenol twice a day and monitor  Polypharmacy  Stop triamcinolone to the face and monitor for rash  CHIEF COMPLAINT:  Monthly Visit    HISTORY OF PRESENT ILLNESS:  History taken from patient    Anoxic brain injury with subsequent ataxic quadriplegia-nurses report stable neuro muscular status  Patient also reports stable neuro muscular status  Bipolar disorder-he continues with chlorpromazine, clonazepam, duloxetine, mirtazapine, valproic acid, and olanzapine therapies  He follows with Psychiatry for these  Forehead rash-continues with triamcinolone cream daily      Constipation-he continues on docusate 100 mg twice a day    REVIEW OF SYSTEMS:  Complete ROS negative other than as stated in HPI    HISTORY:  Medical Hx: Reviewed, unchanged  Family Hx: Reviewed, unchanged  Soc Hx: Reviewed, unchanged  No Known Allergies    PHYSICAL EXAM:  Vital Signs:  Temp 97 4°, HR 87, R 16, O2 96% on room air, /73  General: NAD  Eye Exam: anicteric sclera, no discharge  ENT: moist mucous membranes, no anterior cervical lymphadenopathy noted  Cardiovascular: regular rate, regular rhythm, no murmurs, rubs, or gallops, no edema noted  Pulmonary: no wheeze, no rhonchi  Abdominal: soft, nontender, nondistended, bowel sounds audible  Neurological: alert and responsive, moving all 4 extremities symmetrically  Skin: No significant lesions appreciated, no significant rashes appreciated    PERTINENT LABS/IMAGING DATA:  01/21/2020:  Glucose 81, creatinine 0 85, BUN 17, sodium 140, potassium 3 9, AST 12, ALT 31, total protein 6 9, hemoglobin 13 8, WBC 4 2, platelet 323, valproic acid 24  03/11/2019:  TSH 1 69    CURRENT MEDICATIONS:  All medications reviewed and updated in Nursing Home EMR      Ernie Samayoa MD MPH  3/9/2020 2:38 PM

## 2020-03-09 NOTE — PROGRESS NOTES
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 [facility])  Continue supportive care  Continue wheelchair    Bipolar 1 disorder with moderate tavo ([facility] [address] )  Continue to follow psychiatry with chlorpromazine, clonazepam, duloxetine, mirtazapine, olanzapine, valproic acid    Slow transit constipation  Continue docusate twice a day    Upper back pain, chronic  Seem somewhat improved  Stop Tylenol twice a day and monitor  Polypharmacy  Stop triamcinolone to the face and monitor for rash  CHIEF COMPLAINT:  Monthly Visit    HISTORY OF PRESENT ILLNESS:  History taken from patient    Anoxic brain injury with subsequent ataxic quadriplegia-nurses report stable neuro muscular status  Patient also reports stable neuro muscular status  Bipolar disorder-he continues with chlorpromazine, clonazepam, duloxetine, mirtazapine, valproic acid, and olanzapine therapies  He follows with Psychiatry for these  Forehead rash-continues with triamcinolone cream daily      Constipation-he continues on docusate 100 mg twice a day    REVIEW OF SYSTEMS:  Complete ROS negative other than as stated in HPI    HISTORY:  Medical Hx: Reviewed, unchanged  Family Hx: Reviewed, unchanged  Soc Hx: Reviewed, unchanged  No Known Allergies    PHYSICAL EXAM:  Vital Signs:  Temp 97 4°, HR 87, R 16, O2 96% on room air, /73  General: NAD  Eye Exam: anicteric sclera, no discharge  ENT: moist mucous membranes, no anterior cervical lymphadenopathy noted  Cardiovascular: regular rate, regular rhythm, no murmurs, rubs, or gallops, no edema noted  Pulmonary: no wheeze, no rhonchi  Abdominal: soft, nontender, nondistended, bowel sounds audible  Neurological: alert and responsive, moving all 4 extremities symmetrically  Skin: No significant lesions appreciated, no significant rashes appreciated    PERTINENT LABS/IMAGING DATA:  01/21/2020:  Glucose 81, creatinine 0 85, BUN 17, sodium 140, potassium 3 9, AST 12, ALT 31, total protein 6 9, hemoglobin 13 8, WBC 4 2, platelet 879, valproic acid 24  03/11/2019:  TSH 1 69    CURRENT MEDICATIONS:  All medications reviewed and updated in Nursing Home EMR      Demond Gabriel MD MPH  3/9/2020 2:38 PM

## 2020-03-17 ENCOUNTER — NURSING HOME VISIT (OUTPATIENT)
Dept: GERIATRICS | Facility: OTHER | Age: 55
End: 2020-03-17
Payer: MEDICARE

## 2020-03-17 DIAGNOSIS — L71.0 PERIORAL DERMATITIS: Primary | ICD-10-CM

## 2020-03-17 DIAGNOSIS — F31.12 BIPOLAR 1 DISORDER WITH MODERATE MANIA (HCC): Chronic | ICD-10-CM

## 2020-03-17 PROCEDURE — 99308 SBSQ NF CARE LOW MDM 20: CPT | Performed by: NURSE PRACTITIONER

## 2020-03-17 NOTE — ASSESSMENT & PLAN NOTE
Noted to be in exacerbation for the last few days  Recently off triamcinolone ointment to area  - expected rebound effect from long term steroid use  - in literature it should improve over time without use steroids  Wash face with [employer] or Cetaphil  Use moisturizer to (Cetaphil face moisturizer) face in AM   Stop using Flourinated toothpaste  Will monitor

## 2020-03-17 NOTE — PROGRESS NOTES
Progress Note    Location: [address] [place] [address]  POS: 32 ([facility])    Assessment/Plan:    Perioral dermatitis  Noted to be in exacerbation for the last few days  Recently off triamcinolone ointment to area  - expected rebound effect from long term steroid use  - in literature it should improve over time without use steroids  Wash face with [place] or Cetaphil  Use moisturizer to (Cetaphil face moisturizer) face in AM   Stop using Flourinated toothpaste  Will monitor  Chief complaint / Reason for visit: Acute visit    History of Present Illness: This is a 53 y o  Male patient admitted at [facility] for Chronic Anoxic Encephalopathy  Patient is seen and examined today per nursing request related patient complained of worsening facial rash since taken off Triamcinolone to which have been using long term  On examination, patient OOB, alert, pleasant  Noted exacerbation of red rash to between brows, cheeks and perioral areas  Dry flaky skin along the nasal crease  Patient describes rash as pruritic, sometimes burning and face is tight  Suspect jennifer-oral dermatitis  Patient education on diagnosis including cause, management and relief of symptoms  Patient agrreeable to recommended management: Use of gently soap, warm water, daily gentle face militarization BID  Will continue to monitor  Review of Systems:  Per history of present illness, all other systems reviewed and negative  HISTORY:  Medical Hx: Reviewed, unchanged  Family Hx: Reviewed, unchanged  Soc Hx: Reviewed,  Unchanged    ALLERGY: No Known Allergies    PHYSICAL EXAM:  Vital Signs: T98 1 F    General: NAD  Eye Exam: anicteric sclera, no discharge, no injection  No swelling  Oral Exam: moist mucous membranes  No exudative  Neck Exam: no anterior cervical lymphadenopathy noted, neck supple  Cardiovascular: regular rate, regular rhythm, no murmurs, rubs, or gallops  Pulmonary: no wheeze, no rhonchi   No stridor  Abdominal: soft, non-tender, nondistended, bowel sounds audible x 4 quadrants  Extremities and skin: no edema noted  Red rash to between brows, cheeks and perioral areas  Dry flaky skin along the nasal crease  Neurological: alert and responsive, moving all 4 extremities symmetrically      Laboratory results / Imaging: No new lab results to review at this time  Current Medications:   All medications reviewed and updated in [address] [place] [address] [id number]  3/17/2020

## 2020-03-18 ENCOUNTER — NURSING HOME VISIT (OUTPATIENT)
Dept: GERIATRICS | Facility: OTHER | Age: 55
End: 2020-03-18
Payer: MEDICARE

## 2020-03-18 DIAGNOSIS — L21.9 SEBORRHEIC DERMATITIS: Primary | ICD-10-CM

## 2020-03-18 DIAGNOSIS — G89.29 UPPER BACK PAIN, CHRONIC: ICD-10-CM

## 2020-03-18 DIAGNOSIS — M54.9 UPPER BACK PAIN, CHRONIC: ICD-10-CM

## 2020-03-18 PROCEDURE — 99308 SBSQ NF CARE LOW MDM 20: CPT | Performed by: INTERNAL MEDICINE

## 2020-03-18 NOTE — LETTER
March 18, 2020     Jose Manuel    Patient: Cornel Mcpherson   YOB: 1965   Date of Visit: 3/18/2020       Dear Dr Samaniego Peer: Thank you for referring Marivel Beal to me for evaluation  Below are my notes for this consultation  If you have questions, please do not hesitate to call me  I look forward to following your patient along with you  Sincerely,        Gerson Mcbride MD        CC: No Recipients  Gerson Mcbride MD  3/18/2020 12:42 PM  Sign at close encounter  [address] [place] [address] [phone]  [facility]  POS: 32 ([facility])    NAME: Cornel Mcpherson  AGE: 53 y o  SEX: male    DATE OF ENCOUNTER: 3/18/2020    ASSESSMENT AND PROBLEMS:  Seborrheic dermatitis  Start topical antifungal cream for 2 weeks  May use stock miconazole cream    Upper back pain, chronic  Doing well off of Tylenol therapy  Resolved  CHIEF COMPLAINT:  Rash    HISTORY OF PRESENT ILLNESS:  History taken from patient    Rash-patient reports that rash has been worsening after stopping topical steroid cream to the face  He reports itching  REVIEW OF SYSTEMS:  Complete ROS negative other than as stated in HPI    HISTORY:  Medical Hx: Reviewed, unchanged  Family Hx: Reviewed, unchanged  Soc Hx: Reviewed, unchanged  No Known Allergies    PHYSICAL EXAM:  General: NAD  Skin:  Mildly Erythematous and flaky rash of nasal labial fold      CURRENT MEDICATIONS:  All medications reviewed and updated in Nursing Home EMR      Jose Eduardo Van MD MPH  3/18/2020 12:42 PM

## 2020-03-18 NOTE — PROGRESS NOTES
FOLLOW UP VISIT [facility] [phone]  Location: [facility]  POS: 32 ([facility])    NAME: Dee Dee Haley  AGE: 53 y o  SEX: male    DATE OF ENCOUNTER: 3/18/2020    ASSESSMENT AND PROBLEMS:  Seborrheic dermatitis  Start topical antifungal cream for 2 weeks  May use stock miconazole cream    Upper back pain, chronic  Doing well off of Tylenol therapy  Resolved  CHIEF COMPLAINT:  Rash    HISTORY OF PRESENT ILLNESS:  History taken from patient    Rash-patient reports that rash has been worsening after stopping topical steroid cream to the face  He reports itching  REVIEW OF SYSTEMS:  Complete ROS negative other than as stated in HPI    HISTORY:  Medical Hx: Reviewed, unchanged  Family Hx: Reviewed, unchanged  Soc Hx: Reviewed, unchanged  No Known Allergies    PHYSICAL EXAM:  General: NAD  Skin:  Mildly Erythematous and flaky rash of nasal labial fold      CURRENT MEDICATIONS:  All medications reviewed and updated in Nursing Home EMR      Jose Recinos MD MPH  3/18/2020 12:42 PM

## 2020-04-22 ENCOUNTER — NURSING HOME VISIT (OUTPATIENT)
Dept: GERIATRICS | Facility: OTHER | Age: 55
End: 2020-04-22
Payer: MEDICARE

## 2020-04-22 DIAGNOSIS — G32.81 CEREBELLAR ATAXIA IN DISEASES CLASSIFIED ELSEWHERE (HCC): Chronic | ICD-10-CM

## 2020-04-22 DIAGNOSIS — L30.9 DERMATITIS: Primary | ICD-10-CM

## 2020-04-22 PROBLEM — K30 ACID INDIGESTION: Status: ACTIVE | Noted: 2020-04-22

## 2020-04-22 PROCEDURE — 99309 SBSQ NF CARE MODERATE MDM 30: CPT | Performed by: NURSE PRACTITIONER

## 2020-05-07 ENCOUNTER — NURSING HOME VISIT (OUTPATIENT)
Dept: GERIATRICS | Facility: OTHER | Age: 55
End: 2020-05-07
Payer: MEDICARE

## 2020-05-07 DIAGNOSIS — G89.29 UPPER BACK PAIN, CHRONIC: ICD-10-CM

## 2020-05-07 DIAGNOSIS — G32.81 CEREBELLAR ATAXIA IN DISEASES CLASSIFIED ELSEWHERE (HCC): Primary | Chronic | ICD-10-CM

## 2020-05-07 DIAGNOSIS — M25.531 RIGHT WRIST PAIN: ICD-10-CM

## 2020-05-07 DIAGNOSIS — F31.12 BIPOLAR 1 DISORDER WITH MODERATE MANIA (HCC): Chronic | ICD-10-CM

## 2020-05-07 DIAGNOSIS — M54.9 UPPER BACK PAIN, CHRONIC: ICD-10-CM

## 2020-05-07 DIAGNOSIS — K59.01 SLOW TRANSIT CONSTIPATION: ICD-10-CM

## 2020-05-07 PROBLEM — L30.9 DERMATITIS: Status: RESOLVED | Noted: 2020-04-22 | Resolved: 2020-05-07

## 2020-05-07 PROCEDURE — 99309 SBSQ NF CARE MODERATE MDM 30: CPT | Performed by: NURSE PRACTITIONER

## 2020-05-12 ENCOUNTER — TELEPHONE (OUTPATIENT)
Dept: GERIATRICS | Age: 55
End: 2020-05-12

## 2020-06-06 ENCOUNTER — TELEPHONE (OUTPATIENT)
Dept: OTHER | Facility: OTHER | Age: 55
End: 2020-06-06

## 2020-07-02 ENCOUNTER — NURSING HOME VISIT (OUTPATIENT)
Dept: GERIATRICS | Facility: OTHER | Age: 55
End: 2020-07-02
Payer: MEDICARE

## 2020-07-02 DIAGNOSIS — Z86.69 HISTORY OF ANOXIC BRAIN INJURY: Chronic | ICD-10-CM

## 2020-07-02 DIAGNOSIS — K59.01 SLOW TRANSIT CONSTIPATION: ICD-10-CM

## 2020-07-02 DIAGNOSIS — F31.12 BIPOLAR 1 DISORDER WITH MODERATE MANIA (HCC): Chronic | ICD-10-CM

## 2020-07-02 DIAGNOSIS — G82.50 QUADRIPLEGIA (HCC): Chronic | ICD-10-CM

## 2020-07-02 DIAGNOSIS — G32.81 CEREBELLAR ATAXIA IN DISEASES CLASSIFIED ELSEWHERE (HCC): Primary | Chronic | ICD-10-CM

## 2020-07-02 PROBLEM — L21.9 SEBORRHEIC DERMATITIS: Status: RESOLVED | Noted: 2020-03-17 | Resolved: 2020-07-02

## 2020-07-02 PROBLEM — K30 ACID INDIGESTION: Status: RESOLVED | Noted: 2020-04-22 | Resolved: 2020-07-02

## 2020-07-02 PROCEDURE — 99309 SBSQ NF CARE MODERATE MDM 30: CPT | Performed by: INTERNAL MEDICINE

## 2020-07-02 RX ORDER — LANOLIN ALCOHOL/MO/W.PET/CERES
3 CREAM (GRAM) TOPICAL
COMMUNITY

## 2020-07-02 RX ORDER — TRAZODONE HYDROCHLORIDE 100 MG/1
100 TABLET ORAL
COMMUNITY

## 2020-07-02 RX ORDER — VALPROIC ACID 250 MG/1
500 CAPSULE, LIQUID FILLED ORAL
COMMUNITY

## 2020-07-02 RX ORDER — DOCUSATE SODIUM 100 MG/1
100 CAPSULE, LIQUID FILLED ORAL 2 TIMES DAILY
COMMUNITY
End: 2020-10-19 | Stop reason: ALTCHOICE

## 2020-07-02 RX ORDER — CLONAZEPAM 0.5 MG/1
1 TABLET ORAL
COMMUNITY
End: 2022-05-16

## 2020-07-02 NOTE — LETTER
July 2, 2020     [address]  [place]  [address]  [phone]    Patient: Veria Saint   YOB: 1965   Date of Visit: 7/2/2020       Dear Dr Carreno Gal: Thank you for referring Osiris Singer to me for evaluation  Below are my notes for this consultation  If you have questions, please do not hesitate to call me  I look forward to following your patient along with you  Sincerely,        Magda Herrera MD        CC: No Recipients  Magda Herrera MD  7/2/2020  1:13 PM  Sign at close encounter  [address] [place] [address] [phone]  Location: [address]  POS: [facility]    NAME: Veria Saint  AGE: 63 y o  SEX: male    DATE OF ENCOUNTER: 7/2/2020    ASSESSMENT AND PROBLEMS:  1  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 [facility])  Assessment & Plan:  Stable  Secondary to anoxic brain injury  Continue supportive care      2  History of anoxic brain injury  Assessment & Plan:  As above  3  Bipolar 1 disorder with moderate tavo ([facility])  Assessment & Plan:  Continue chlorpromazine, clonazepam, mirtazapine, duloxetine, olanzapine, trazodone, valproic acid  Stable continue follow-up with psychiatry      4  Quadriplegia ([facility] [address] )  Assessment & Plan:  Stable  Continue supportive care      5  Slow transit constipation  Assessment & Plan:  Continue docusate therapy        CHIEF COMPLAINT:  Monthly Visit    HISTORY OF PRESENT ILLNESS:  History taken from patient and nursing staff    History of anoxic brain injury with cerebellar ataxia and quadriplegia-patient continues with supportive care  Nurses report stable neuro muscular status  Bipolar disorder-continues with trazodone, valproic acid, olanzapine, duloxetine, clonazepam, chlorpromazine, mirtazapine therapies  Nurses report stable mood  Patient reports doing well overall  Constipation-continues with docusate twice a day      REVIEW OF SYSTEMS:  Complete ROS negative other than as stated in HPI    HISTORY:  Medical Hx: Reviewed, unchanged  Family Hx: Reviewed, unchanged  Soc Hx: Reviewed, unchanged  No Known Allergies    PHYSICAL EXAM:  Vital Signs:  /85, temp 97 4°, HR 87, R 18, O2 94% on room air, weight 91 7 kg  General: NAD  Eye Exam: anicteric sclera, no discharge  ENT: moist mucous membranes, no anterior cervical lymphadenopathy noted  Cardiovascular: regular rate, regular rhythm, no murmurs, rubs, or gallops, no edema noted  Pulmonary: no wheeze, no rhonchi  Abdominal: soft, nontender, nondistended, bowel sounds audible  Neurological: alert and responsive, with weakness to all 4 extremities  Skin: No significant lesions appreciated, no significant rashes appreciated    PERTINENT LABS/IMAGING DATA:  06/19/2020:  Normal coronavirus PCR negative  06/07/2020:  Creatinine 0 78, sodium 142, potassium 4 0, AST 7, ALT 20, hemoglobin 13 5, WBC 5 1, platelet 892    CURRENT MEDICATIONS:  All medications reviewed and updated in Nursing Home EMR      Giovanna Juarez MD MPH  7/2/2020 1:13 PM

## 2020-07-02 NOTE — ASSESSMENT & PLAN NOTE
Continue chlorpromazine, clonazepam, mirtazapine, duloxetine, olanzapine, trazodone, valproic acid  Stable continue follow-up with psychiatry  Follow CBC/CMP q6 months

## 2020-07-02 NOTE — PROGRESS NOTES
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 [facility])  Assessment & Plan:  Stable  Secondary to anoxic brain injury  Continue supportive care      2  History of anoxic brain injury  Assessment & Plan:  As above  3  Bipolar 1 disorder with moderate tavo ([facility])  Assessment & Plan:  Continue chlorpromazine, clonazepam, mirtazapine, duloxetine, olanzapine, trazodone, valproic acid  Stable continue follow-up with psychiatry      4  Quadriplegia ([facility] [address] )  Assessment & Plan:  Stable  Continue supportive care      5  Slow transit constipation  Assessment & Plan:  Continue docusate therapy        CHIEF COMPLAINT:  Monthly Visit    HISTORY OF PRESENT ILLNESS:  History taken from patient and nursing staff    History of anoxic brain injury with cerebellar ataxia and quadriplegia-patient continues with supportive care  Nurses report stable neuro muscular status  Bipolar disorder-continues with trazodone, valproic acid, olanzapine, duloxetine, clonazepam, chlorpromazine, mirtazapine therapies  Nurses report stable mood  Patient reports doing well overall  Constipation-continues with docusate twice a day      REVIEW OF SYSTEMS:  Complete ROS negative other than as stated in HPI    HISTORY:  Medical Hx: Reviewed, unchanged  Family Hx: Reviewed, unchanged  Soc Hx: Reviewed, unchanged  No Known Allergies    PHYSICAL EXAM:  Vital Signs:  /85, temp 97 4°, HR 87, R 18, O2 94% on room air, weight 91 7 kg  General: NAD  Eye Exam: anicteric sclera, no discharge  ENT: moist mucous membranes, no anterior cervical lymphadenopathy noted  Cardiovascular: regular rate, regular rhythm, no murmurs, rubs, or gallops, no edema noted  Pulmonary: no wheeze, no rhonchi  Abdominal: soft, nontender, nondistended, bowel sounds audible  Neurological: alert and responsive, with weakness to all 4 extremities  Skin: No significant lesions appreciated, no significant rashes appreciated    PERTINENT LABS/IMAGING DATA:  06/19/2020:  Normal coronavirus PCR negative  06/07/2020:  Creatinine 0 78, sodium 142, potassium 4 0, AST 7, ALT 20, hemoglobin 13 5, WBC 5 1, platelet 823    CURRENT MEDICATIONS:  All medications reviewed and updated in Nursing Home EMR      Ward Ragsdale MD MPH  7/2/2020 1:13 PM

## 2020-07-25 ENCOUNTER — TELEPHONE (OUTPATIENT)
Dept: OTHER | Facility: OTHER | Age: 55
End: 2020-07-25

## 2020-07-27 ENCOUNTER — NURSING HOME VISIT (OUTPATIENT)
Dept: GERIATRICS | Facility: OTHER | Age: 55
End: 2020-07-27
Payer: MEDICARE

## 2020-07-27 DIAGNOSIS — R42 DIZZINESS: Primary | ICD-10-CM

## 2020-07-27 DIAGNOSIS — G32.81 CEREBELLAR ATAXIA IN DISEASES CLASSIFIED ELSEWHERE (HCC): Chronic | ICD-10-CM

## 2020-07-27 DIAGNOSIS — F31.12 BIPOLAR 1 DISORDER WITH MODERATE MANIA (HCC): Chronic | ICD-10-CM

## 2020-07-27 PROCEDURE — 99309 SBSQ NF CARE MODERATE MDM 30: CPT | Performed by: INTERNAL MEDICINE

## 2020-07-27 NOTE — ASSESSMENT & PLAN NOTE
Symptoms may be related to known diagnosis of cerebellar ataxia  Continue monitor symptoms    Recheck CBC and CMP

## 2020-07-27 NOTE — LETTER
July 27, 2020     [address]  [address] [facility]   [place]   [address]  [phone]    Patient: Tiana Manzanares   YOB: 1965   Date of Visit: 7/27/2020       Dear Dr Serrano General: Thank you for referring Cem Castillo to me for evaluation  Below are my notes for this consultation  If you have questions, please do not hesitate to call me  I look forward to following your patient along with you  Sincerely,        Edy Bateman MD        CC: No Recipients  Edy Bateman MD  7/27/2020  3:22 PM  Sign at close encounter  [address] [facility] [phone]  Location: [facility]  POS: 32 ([facility])    NAME: Tiana Manzanares  AGE: 63 y o  SEX: male    DATE OF ENCOUNTER: 7/27/2020    ASSESSMENT AND PROBLEMS:  1  Dizziness  Assessment & Plan:  Symptoms are nonspecific as he attests to both presyncope and vertigo symptoms as well visual changes without any significant changes in eye exam   Considering lower blood pressure, check CBC and CMP for any potential metabolic signs or causes of lower perfusion pressure such as anemia or acute renal failure  Continue to monitor  This may also be related to multiple psychotropic medication side effects  We will 1st check for laboratory abnormalities and suggest review with Psychiatry      2  Bipolar 1 disorder with moderate tavo (HCC)  Assessment & Plan:  Continue current clonazepam, duloxetine, mirtazapine, olanzapine, trazodone, valproic acid      3  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)  Assessment & Plan:  Symptoms may be related to known diagnosis of cerebellar ataxia  Continue monitor symptoms  Recheck CBC and CMP        CHIEF COMPLAINT:  Dizziness    HISTORY OF PRESENT ILLNESS:  History taken from nursing staff and patient    Dizziness-patient reports feeling some dizziness today but also yesterday    Nursing staff reports that he has had a syndrome in the past   Patient reports syndrome of dizziness  He reports feeling of lightheadedness like he might pass out as well agrees to feeling of the room spinning around and then also reports that he think he has nystagmus  He feels that he has this for hours in the morning and also the evening  He thinks that he had the symptoms started before taking his morning medications  Nursing staff reports that he has reported some eye findings similar to this and some dizziness occasionally in the past     Fall-has well patient did have a fall  He reports that it was a minor fall because he was not being careful he did have a very slight injury to the knee    He currently is wearing a Band-Aid over his right knee and he reports that it does not hurt now    Bipolar disorder-continues with multiple medications including clonazepam, high-dose duloxetine, mirtazapine, olanzapine, trazodone, valproic acid    REVIEW OF SYSTEMS:  Complete ROS negative other than as stated in HPI    HISTORY:  Medical Hx: Reviewed, unchanged  Family Hx: Reviewed, unchanged  Soc Hx: Reviewed, unchanged  No Known Allergies    PHYSICAL EXAM:  Vital Signs:  /66, temp 97 1°, HR 73, RR 16, O2 95% on room air  General: NAD  Eye Exam: anicteric sclera, no discharge, EOMI, PERRL, vision grossly intact for each orbit, mild cataracts noted  ENT: moist mucous membranes, no anterior cervical lymphadenopathy noted  Cardiovascular: regular rate, regular rhythm, no murmurs, rubs, or gallops, no edema noted  Pulmonary: no wheeze, no rhonchi  Abdominal: soft, nontender, nondistended, bowel sounds audible  Neurological: alert and responsive, moving all 4 extremities symmetrically  Skin:  Right knee wound covered with Band-Aid, no significant rashes appreciated    PERTINENT LABS/IMAGING DATA:  06/07/2020:  Creatinine 0 78, glucose 88, sodium 142, potassium 4 0, AST 7, ALT 20, hemoglobin 13 5, WBC 5 1, platelet 917    CURRENT MEDICATIONS:  All medications reviewed and updated in Nursing Home Sha Cao MD MPH  7/27/2020 3:22 PM

## 2020-07-27 NOTE — ASSESSMENT & PLAN NOTE
Symptoms are nonspecific as he attests to both presyncope and vertigo symptoms as well visual changes without any significant changes in eye exam   Considering lower blood pressure, check CBC and CMP for any potential metabolic signs or causes of lower perfusion pressure such as anemia or acute renal failure  Continue to monitor  This may also be related to multiple psychotropic medication side effects    We will 1st check for laboratory abnormalities and suggest review with Psychiatry

## 2020-07-27 NOTE — PROGRESS NOTES
FOLLOW UP VISIT [facility] [phone]  Location: [facility]  POS: 32 ([facility])    NAME: Neha Hernandez  AGE: 63 y o  SEX: male    DATE OF ENCOUNTER: 7/27/2020    ASSESSMENT AND PROBLEMS:  1  Dizziness  Assessment & Plan:  Symptoms are nonspecific as he attests to both presyncope and vertigo symptoms as well visual changes without any significant changes in eye exam   Considering lower blood pressure, check CBC and CMP for any potential metabolic signs or causes of lower perfusion pressure such as anemia or acute renal failure  Continue to monitor  This may also be related to multiple psychotropic medication side effects  We will 1st check for laboratory abnormalities and suggest review with Psychiatry      2  Bipolar 1 disorder with moderate tavo (HCC)  Assessment & Plan:  Continue current clonazepam, duloxetine, mirtazapine, olanzapine, trazodone, valproic acid      3  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 [facility])  Assessment & Plan:  Symptoms may be related to known diagnosis of cerebellar ataxia  Continue monitor symptoms  Recheck CBC and CMP        CHIEF COMPLAINT:  Dizziness    HISTORY OF PRESENT ILLNESS:  History taken from nursing staff and patient    Dizziness-patient reports feeling some dizziness today but also yesterday  Nursing staff reports that he has had a syndrome in the past   Patient reports syndrome of dizziness  He reports feeling of lightheadedness like he might pass out as well agrees to feeling of the room spinning around and then also reports that he think he has nystagmus  He feels that he has this for hours in the morning and also the evening  He thinks that he had the symptoms started before taking his morning medications  Nursing staff reports that he has reported some eye findings similar to this and some dizziness occasionally in the past     Fall-has well patient did have a fall    He reports that it was a minor fall because he was not being careful he did have a very slight injury to the knee  He currently is wearing a Band-Aid over his right knee and he reports that it does not hurt now    Bipolar disorder-continues with multiple medications including clonazepam, high-dose duloxetine, mirtazapine, olanzapine, trazodone, valproic acid    REVIEW OF SYSTEMS:  Complete ROS negative other than as stated in HPI    HISTORY:  Medical Hx: Reviewed, unchanged  Family Hx: Reviewed, unchanged  Soc Hx: Reviewed, unchanged  No Known Allergies    PHYSICAL EXAM:  Vital Signs:  /66, temp 97 1°, HR 73, RR 16, O2 95% on room air  General: NAD  Eye Exam: anicteric sclera, no discharge, EOMI, PERRL, vision grossly intact for each orbit, mild cataracts noted  ENT: moist mucous membranes, no anterior cervical lymphadenopathy noted  Cardiovascular: regular rate, regular rhythm, no murmurs, rubs, or gallops, no edema noted  Pulmonary: no wheeze, no rhonchi  Abdominal: soft, nontender, nondistended, bowel sounds audible  Neurological: alert and responsive, moving all 4 extremities symmetrically  Skin:  Right knee wound covered with Band-Aid, no significant rashes appreciated    PERTINENT LABS/IMAGING DATA:  06/07/2020:  Creatinine 0 78, glucose 88, sodium 142, potassium 4 0, AST 7, ALT 20, hemoglobin 13 5, WBC 5 1, platelet 897    CURRENT MEDICATIONS:  All medications reviewed and updated in Nursing Home EMR      Dania Pemberton MD MPH  7/27/2020 3:22 PM

## 2020-08-12 ENCOUNTER — NURSING HOME VISIT (OUTPATIENT)
Dept: GERIATRICS | Facility: OTHER | Age: 55
End: 2020-08-12
Payer: MEDICARE

## 2020-08-12 DIAGNOSIS — M79.642 CHRONIC HAND PAIN, LEFT: Primary | ICD-10-CM

## 2020-08-12 DIAGNOSIS — G32.81 CEREBELLAR ATAXIA IN DISEASES CLASSIFIED ELSEWHERE (HCC): Chronic | ICD-10-CM

## 2020-08-12 DIAGNOSIS — G89.29 CHRONIC HAND PAIN, LEFT: Primary | ICD-10-CM

## 2020-08-12 PROBLEM — M65.9 TENOSYNOVITIS OF HAND: Status: ACTIVE | Noted: 2020-08-12

## 2020-08-12 PROBLEM — M67.40 GANGLION CYST: Status: ACTIVE | Noted: 2020-08-12

## 2020-08-12 PROBLEM — M65.949 TENOSYNOVITIS OF HAND: Status: ACTIVE | Noted: 2020-08-12

## 2020-08-12 PROCEDURE — 99308 SBSQ NF CARE LOW MDM 20: CPT | Performed by: NURSE PRACTITIONER

## 2020-08-12 NOTE — ASSESSMENT & PLAN NOTE
Suspect Ganglion cyst versus tendinitis  XRAY to Right hand (rule out fracture)  Give PRN Acetaminophen 650mg Q4 hours (pain)  Apply Bengay cream TID to Right hand dorsum swelling  Avoid hyperflexion or applying weight to Right hand until cleared for fracture/dislocation    Check Urin acid to rule out gout

## 2020-08-12 NOTE — ASSESSMENT & PLAN NOTE
Stable for now    Patient often non compliant to transfer restrictions/recommendations  Strong Hx of falls  High impulsivity to self transfer despite repeated reminders  = often uses B/L hands strength to self transfer

## 2020-08-12 NOTE — PROGRESS NOTES
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 ([address] )  Stable for now  Patient often non compliant to transfer restrictions/recommendations  Strong Hx of falls  High impulsivity to self transfer despite repeated reminders  = often uses B/L hands strength to self transfer      Chief complaint / Reason for visit: Acute visit    History of Present Illness: This is a 63 y o  Male patient admitted at [facility] for Cerebellar Ataxia  Patient is seen and examined today per nursing request for patient report of Right hand pain x 4 months now  Per nursing, no documented or priro report of Right hand pain in the past - aware only of discomfort today  Patient is OOB, sitting in manual wheel chair that he self propels around room- Alert, cooperative, pleasant, calm and at baseline orientation/ mentation  Patient reported Right hand pain today but onset has been about 4 months now  Patient denies tingling, numbness  No change in hand movements  Per patient pain on palpation and certain movements/position of hand  Left hand no acute medical concerns on this visit  On examination, noted localized slight swelling on the Right dorsum of hand proximal to wrist and between index and thumb fingers  Tender on gentle palpation, no erythema  Suspect for either ganglion cyst or tendinitis  Will do conservative management for now but will check for fracture/dislocation and possibility of gout  Nursing to assess for pain Q shift and offer PRN Acetaminophen   Will also add topical pain reliever (Bengay) TID until resolved  Patient teaching on avoid hyperflexion or applying weight to hand until cleared with imaging and lab results  Review of Systems:  Per history of present illness, all other systems reviewed and negative  HISTORY:  Medical Hx: Reviewed, unchanged  Family Hx: Reviewed, unchanged  Soc Hx: Reviewed,  Unchanged    ALLERGY: No Known Allergies    PHYSICAL EXAM:  Vital Signs: T97 9F    Physical Exam  Constitutional:       General: He is not in acute distress  Appearance: Normal appearance  He is normal weight  He is not ill-appearing, toxic-appearing or diaphoretic  HENT:      Head: Normocephalic and atraumatic  Right Ear: Tympanic membrane, ear canal and external ear normal  There is no impacted cerumen  Left Ear: Tympanic membrane, ear canal and external ear normal  There is no impacted cerumen  Nose: Nose normal  No congestion or rhinorrhea  Mouth/Throat:      Mouth: Mucous membranes are moist       Pharynx: Oropharynx is clear  No oropharyngeal exudate or posterior oropharyngeal erythema  Eyes:      General: No scleral icterus  Right eye: No discharge  Left eye: No discharge  Conjunctiva/sclera: Conjunctivae normal       Pupils: Pupils are equal, round, and reactive to light  Neck:      Musculoskeletal: Neck supple  No neck rigidity or muscular tenderness  Vascular: No carotid bruit  Cardiovascular:      Rate and Rhythm: Normal rate and regular rhythm  Heart sounds: Normal heart sounds  No murmur  No friction rub  No gallop  Pulmonary:      Effort: Pulmonary effort is normal  No respiratory distress  Breath sounds: Normal breath sounds  No stridor  No wheezing, rhonchi or rales  Chest:      Chest wall: No tenderness  Abdominal:      General: Abdomen is flat  Bowel sounds are normal  There is no distension  Palpations: Abdomen is soft  There is no mass  Tenderness:  There is no abdominal tenderness  There is no right CVA tenderness, left CVA tenderness, guarding or rebound  Hernia: No hernia is present  Genitourinary:     Comments: Non distended bladder  Musculoskeletal:         General: Swelling and tenderness present  No deformity or signs of injury  Right lower leg: No edema  Left lower leg: No edema  Comments: Tenderness on palpation  Localized rounded swelling/lump to Right hand dorsum:  = proximal to wrist between index and thumb fingers  Chronic: 4 months now  Lymphadenopathy:      Cervical: No cervical adenopathy  Skin:     General: Skin is warm and dry  Coloration: Skin is not jaundiced or pale  Findings: No bruising, erythema, lesion or rash  Neurological:      Mental Status: He is alert and oriented to person, place, and time  Mental status is at baseline  Psychiatric:         Mood and Affect: Mood normal          Behavior: Behavior normal          Thought Content: Thought content normal        Laboratory results / Imaging: Hard copies in medical chart:    * CMP (7/28/2020) = WNL     * CBC with diff (7/28/2020) = WNL    Current Medications:   All medications reviewed and updated in [address] [place] [address] [id number]  8/12/2020

## 2020-08-17 ENCOUNTER — NURSING HOME VISIT (OUTPATIENT)
Dept: GERIATRICS | Facility: OTHER | Age: 55
End: 2020-08-17
Payer: MEDICARE

## 2020-08-17 DIAGNOSIS — F31.12 BIPOLAR 1 DISORDER WITH MODERATE MANIA (HCC): Chronic | ICD-10-CM

## 2020-08-17 DIAGNOSIS — G82.50 QUADRIPLEGIA (HCC): Primary | Chronic | ICD-10-CM

## 2020-08-17 DIAGNOSIS — G32.81 CEREBELLAR ATAXIA IN DISEASES CLASSIFIED ELSEWHERE (HCC): Chronic | ICD-10-CM

## 2020-08-17 DIAGNOSIS — M25.531 RIGHT WRIST PAIN: ICD-10-CM

## 2020-08-17 PROCEDURE — 99309 SBSQ NF CARE MODERATE MDM 30: CPT | Performed by: INTERNAL MEDICINE

## 2020-08-17 NOTE — PROGRESS NOTES
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 [facility])  Assessment & Plan:  Continue supportive care, assistance with transfers, bathing      3  Bipolar 1 disorder with moderate tavo (HCC)  Assessment & Plan:  Stable  Continue current medications      4  Right wrist pain  Assessment & Plan:  Continue with conservative management  Exam generally unremarkable of the wrist        CHIEF COMPLAINT:  Monthly Visit    HISTORY OF PRESENT ILLNESS:  History taken from patient and nursing staff    Cerebellar ataxia in quadriplegia-patient reports stable status  Nurses also agree with stable neuro muscular status  Bipolar disorder-continues with multiple medications including chlorpromazine, clonazepam, duloxetine, mirtazapine, olanzapine, trazodone valproate  Patient nurses report stable status  Right wrist pain-patient continues with right wrist pain  Reports that it only hurts when he puts pressure on it    No pain at rest     REVIEW OF SYSTEMS:  Complete ROS negative other than as stated in HPI    HISTORY:  Medical Hx: Reviewed, unchanged  Family Hx: Reviewed, unchanged  Soc Hx: Reviewed, unchanged  No Known Allergies    PHYSICAL EXAM:  Vital Signs:  Weight 88 3 kg, height 74 in  General: NAD  Eye Exam: anicteric sclera, no discharge  ENT: moist mucous membranes, no anterior cervical lymphadenopathy noted  Cardiovascular: regular rate, regular rhythm, no murmurs, rubs, or gallops, no edema noted  Pulmonary: no wheeze, no rhonchi  Abdominal: soft, nontender, nondistended, bowel sounds audible  Extremities:  Right wrist without tenderness or redness, range of motion intact  Neurological: alert and responsive, moving all 4 extremities symmetrically  Skin: No significant lesions appreciated, no significant rashes appreciated    PERTINENT LABS/IMAGING DATA:  08/13/2020:  Uric acid 5 2  08/13/2020:  X-ray hand, right:  No fracture or dislocation    CURRENT MEDICATIONS:  All medications reviewed and updated in Nursing Home EMR      Ana Lopez MD MPH  8/17/2020 4:10 PM

## 2020-08-17 NOTE — LETTER
August 17, 2020     [address] 3rd [address]  [address] [facility]   [place] Archer   [phone]  [phone]    Patient: María Bernardo   YOB: 1965   Date of Visit: 8/17/2020       Dear Dr Soto Side: Thank you for referring Rufino Cao to me for evaluation  Below are my notes for this consultation  If you have questions, please do not hesitate to call me  I look forward to following your patient along with you  Sincerely,        Gisselle Singh MD        CC: No Recipients  Gisselle Singh MD  8/17/2020  4:11 PM  Sign when Signing Visit  FOLLOW UP VISIT - [facility] [phone]  Location: [facility]  POS: [facility]    NAME: María Bernardo  AGE: 63 y o  SEX: male    DATE OF ENCOUNTER: 8/17/2020    ASSESSMENT AND PROBLEMS:  1  Quadriplegia ([address] )  Assessment & Plan:  Stable  Continue wheelchair and supportive care      2  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 [facility])  Assessment & Plan:  Continue supportive care, assistance with transfers, bathing      3  Bipolar 1 disorder with moderate tavo (HCC)  Assessment & Plan:  Stable  Continue current medications      4  Right wrist pain  Assessment & Plan:  Continue with conservative management  Exam generally unremarkable of the wrist        CHIEF COMPLAINT:  Monthly Visit    HISTORY OF PRESENT ILLNESS:  History taken from patient and nursing staff    Cerebellar ataxia in quadriplegia-patient reports stable status  Nurses also agree with stable neuro muscular status  Bipolar disorder-continues with multiple medications including chlorpromazine, clonazepam, duloxetine, mirtazapine, olanzapine, trazodone valproate  Patient nurses report stable status  Right wrist pain-patient continues with right wrist pain  Reports that it only hurts when he puts pressure on it    No pain at rest     REVIEW OF SYSTEMS:  Complete ROS negative other than as stated in HPI    HISTORY:  Medical Hx: Reviewed, unchanged  Family Hx: Reviewed, unchanged  Soc Hx: Reviewed, unchanged  No Known Allergies    PHYSICAL EXAM:  Vital Signs:  Weight 88 3 kg, height 74 in  General: NAD  Eye Exam: anicteric sclera, no discharge  ENT: moist mucous membranes, no anterior cervical lymphadenopathy noted  Cardiovascular: regular rate, regular rhythm, no murmurs, rubs, or gallops, no edema noted  Pulmonary: no wheeze, no rhonchi  Abdominal: soft, nontender, nondistended, bowel sounds audible  Extremities:  Right wrist without tenderness or redness, range of motion intact  Neurological: alert and responsive, moving all 4 extremities symmetrically  Skin: No significant lesions appreciated, no significant rashes appreciated    PERTINENT LABS/IMAGING DATA:  08/13/2020:  Uric acid 5 2  08/13/2020:  X-ray hand, right:  No fracture or dislocation    CURRENT MEDICATIONS:  All medications reviewed and updated in Nursing Home EMR      Shanthi Andrews MD MPH  8/17/2020 4:10 PM

## 2020-08-24 ENCOUNTER — NURSING HOME VISIT (OUTPATIENT)
Dept: GERIATRICS | Facility: OTHER | Age: 55
End: 2020-08-24
Payer: MEDICARE

## 2020-08-24 DIAGNOSIS — Z86.69 HISTORY OF ANOXIC BRAIN INJURY: Chronic | ICD-10-CM

## 2020-08-24 DIAGNOSIS — F31.12 BIPOLAR 1 DISORDER WITH MODERATE MANIA (HCC): Chronic | ICD-10-CM

## 2020-08-24 DIAGNOSIS — G32.81 CEREBELLAR ATAXIA IN DISEASES CLASSIFIED ELSEWHERE (HCC): Primary | Chronic | ICD-10-CM

## 2020-08-24 DIAGNOSIS — K59.01 SLOW TRANSIT CONSTIPATION: Chronic | ICD-10-CM

## 2020-08-24 PROCEDURE — 99309 SBSQ NF CARE MODERATE MDM 30: CPT | Performed by: INTERNAL MEDICINE

## 2020-08-24 NOTE — ASSESSMENT & PLAN NOTE
Stable  Continue chlorpromazine, clonazepam, duloxetine, mirtazapine, olanzapine, trazodone, valproic acid    Continue monitor CBC and CMP every 6 months

## 2020-08-24 NOTE — PROGRESS NOTES
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 [facility])  Assessment & Plan:  Overall stable status  Continue supportive care      2  History of anoxic brain injury  Assessment & Plan:  Stable  3  Bipolar 1 disorder with moderate tavo (HCC)  Assessment & Plan:  Stable  Continue chlorpromazine, clonazepam, duloxetine, mirtazapine, olanzapine, trazodone, valproic acid  Continue monitor CBC and CMP every 6 months      4  Slow transit constipation  Assessment & Plan:  Stable  Continue docusate        CHIEF COMPLAINT:  Monthly Visit    HISTORY OF PRESENT ILLNESS:  History taken from patient and nursing staff    Quadriplegia-patient reports stable neuro muscular status  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-patient reports stable neuro muscular status  Bipolar disorder-patient continues with chlorpromazine, clonazepam, duloxetine, mirtazapine, olanzapine, trazodone, valproic acid  Nurses report stable status      Constipation-continues with docusate twice a day    REVIEW OF SYSTEMS:  Complete ROS negative other than as stated in HPI    HISTORY:  Medical Hx: Reviewed, unchanged  Family Hx: Reviewed, unchanged  Soc Hx: Reviewed, unchanged  No Known Allergies    PHYSICAL EXAM:  Vital Signs:  /73, HR 85, R 18, temp 97 7  General: NAD, sitting in wheelchair  Eye Exam: anicteric sclera, no discharge  ENT: moist mucous membranes, no anterior cervical lymphadenopathy noted  Cardiovascular: regular rate, regular rhythm, no murmurs, rubs, or gallops, no edema noted  Pulmonary: no wheeze, no rhonchi  Abdominal: soft, nontender, nondistended, bowel sounds audible  Neurological: alert and responsive, with moderate weakness all 4 extremities  Skin: No significant lesions appreciated, no significant rashes appreciated    PERTINENT LABS/IMAGING DATA:  08/14/2020:  Mild coronavirus 2019 PCR:  Not detected  07/28/2020:  Glucose 109, BUN 15, creatinine 0 83, sodium 141, potassium 3 8, hemoglobin 14 3, WBC 4 3, platelet 192    CURRENT MEDICATIONS:  All medications reviewed and updated in Nursing Home EMR      Ben Henderson MD MPH  8/24/2020 3:10 PM

## 2020-08-24 NOTE — LETTER
August 24, 2020     [address] [facility]  [address] [facility]    Patient: Darleen Chu   YOB: 1965   Date of Visit: 8/24/2020       Dear Dr Bisi Martin: Thank you for referring Alfredo Slater to me for evaluation  Below are my notes for this consultation  If you have questions, please do not hesitate to call me  I look forward to following your patient along with you  Sincerely,        Minh De León MD        CC: No Recipients  Minh De León MD  8/24/2020  3:10 PM  Sign when Signing Visit  FOLLOW UP VISIT - [address] [facility] [phone]  Location: [facility]  POS: [facility]    NAME: Darleen Chu  AGE: 63 y o  SEX: male    DATE OF ENCOUNTER: 8/24/2020    ASSESSMENT AND PROBLEMS:  1  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)  Assessment & Plan:  Overall stable status  Continue supportive care      2  History of anoxic brain injury  Assessment & Plan:  Stable  3  Bipolar 1 disorder with moderate tavo (HCC)  Assessment & Plan:  Stable  Continue chlorpromazine, clonazepam, duloxetine, mirtazapine, olanzapine, trazodone, valproic acid  Continue monitor CBC and CMP every 6 months      4  Slow transit constipation  Assessment & Plan:  Stable  Continue docusate        CHIEF COMPLAINT:  Monthly Visit    HISTORY OF PRESENT ILLNESS:  History taken from patient and nursing staff    Quadriplegia-patient reports stable neuro muscular status  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-patient reports stable neuro muscular status  Bipolar disorder-patient continues with chlorpromazine, clonazepam, duloxetine, mirtazapine, olanzapine, trazodone, valproic acid  Nurses report stable status      Constipation-continues with docusate twice a day    REVIEW OF SYSTEMS:  Complete ROS negative other than as stated in HPI    HISTORY:  Medical Hx: Reviewed, unchanged  Family Hx: Reviewed, unchanged  Soc Hx: Reviewed, unchanged  No Known Allergies    PHYSICAL EXAM:  Vital Signs:  /73, HR 85, R 18, temp 97 7  General: NAD, sitting in wheelchair  Eye Exam: anicteric sclera, no discharge  ENT: moist mucous membranes, no anterior cervical lymphadenopathy noted  Cardiovascular: regular rate, regular rhythm, no murmurs, rubs, or gallops, no edema noted  Pulmonary: no wheeze, no rhonchi  Abdominal: soft, nontender, nondistended, bowel sounds audible  Neurological: alert and responsive, with moderate weakness all 4 extremities  Skin: No significant lesions appreciated, no significant rashes appreciated    PERTINENT LABS/IMAGING DATA:  08/14/2020:  Mild coronavirus 2019 PCR:  Not detected  07/28/2020:  Glucose 109, BUN 15, creatinine 0 83, sodium 141, potassium 3 8, hemoglobin 14 3, WBC 4 3, platelet 224    CURRENT MEDICATIONS:  All medications reviewed and updated in Nursing Home EMR      Brook Rosario MD MPH  8/24/2020 3:10 PM

## 2020-09-17 ENCOUNTER — NURSING HOME VISIT (OUTPATIENT)
Dept: GERIATRICS | Facility: OTHER | Age: 55
End: 2020-09-17
Payer: MEDICARE

## 2020-09-17 DIAGNOSIS — G32.81 CEREBELLAR ATAXIA IN DISEASES CLASSIFIED ELSEWHERE (HCC): Chronic | ICD-10-CM

## 2020-09-17 DIAGNOSIS — M79.642 CHRONIC HAND PAIN, LEFT: Primary | ICD-10-CM

## 2020-09-17 DIAGNOSIS — G89.29 CHRONIC HAND PAIN, LEFT: Primary | ICD-10-CM

## 2020-09-17 PROBLEM — M79.641 CHRONIC PAIN OF RIGHT HAND: Status: ACTIVE | Noted: 2020-08-12

## 2020-09-17 PROCEDURE — 99308 SBSQ NF CARE LOW MDM 20: CPT | Performed by: NURSE PRACTITIONER

## 2020-09-17 NOTE — PROGRESS NOTES
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 ([facility] [address] )  Strong hx of recurrent falls   Potentially may have injured Right hand during past fall  Fall precaution  Continue 24/7 LTCF supportive care and management  Continue PT/OT/ST as needed  Chief complaint / Reason for visit: Follow-up visit    History of Present Illness: This is a 63 y o  Male patient admitted at [facility] for Quadriplegia  Patient is seen and examined today per nursing request for unrelieved or unresolved Right hand pain initially seen and managed on 8/12/2020  Patient is alert, not in distress but expressed worry about unresolved pain and change in hand function especially use of eating utensils and transfers  Imaging (XRAY) showed no dislocation or fracture and no soft tissue swelling (8/13/2020)  Per patient, pain is more than 4 months prior to initially assessment by this provider  Minimal relief with topical pain reliever and PT is currently trialing hand splint  Will consult physiatry or pain management before re-imaging (CT scan)  For now, nursing to monitor for pain and provide with PRN pain med available  Examination to Right hand unchanged - still with tissue swelling over Right hand dorsum for anterior wrist to right thumb area - pain with movement only, no erythema  Review of Systems:  Per history of present illness, all other systems reviewed and negative      HISTORY:  Medical Hx: Reviewed, unchanged  Family Hx: Reviewed, unchanged  Soc Hx: Reviewed,  Unchanged    ALLERGY: No Known Allergies    PHYSICAL EXAM:  Vital Signs: T97 6F    Physical Exam  Vitals signs and nursing note reviewed  Constitutional:       General: He is not in acute distress  Appearance: Normal appearance  He is normal weight  He is not ill-appearing, toxic-appearing or diaphoretic  HENT:      Head: Normocephalic and atraumatic  Nose: Nose normal  No congestion or rhinorrhea  Mouth/Throat:      Mouth: Mucous membranes are moist       Pharynx: Oropharynx is clear  No oropharyngeal exudate or posterior oropharyngeal erythema  Eyes:      General: No scleral icterus  Right eye: No discharge  Left eye: No discharge  Conjunctiva/sclera: Conjunctivae normal       Pupils: Pupils are equal, round, and reactive to light  Neck:      Musculoskeletal: Normal range of motion and neck supple  No neck rigidity or muscular tenderness  Vascular: No carotid bruit  Cardiovascular:      Rate and Rhythm: Regular rhythm  Tachycardia present  Heart sounds: Normal heart sounds  No murmur  No friction rub  No gallop  Pulmonary:      Effort: Pulmonary effort is normal  No respiratory distress  Breath sounds: Normal breath sounds  No stridor  No wheezing, rhonchi or rales  Chest:      Chest wall: No tenderness  Musculoskeletal:         General: Swelling present  No tenderness, deformity or signs of injury  Right lower leg: No edema  Left lower leg: No edema  Comments: Ambulatory dysfunction - wheelchair bound  Right hand dorsum swelling (anterior wrist towards the Right thumb area) - no tenderness, no erythema  Pain with movement only  Lymphadenopathy:      Cervical: No cervical adenopathy  Skin:     General: Skin is warm  Coloration: Skin is not jaundiced or pale  Findings: No bruising, erythema, lesion or rash  Neurological:      Mental Status: He is alert and oriented to person, place, and time  Mental status is at baseline     Psychiatric:         Mood and Affect: Mood normal  Behavior: Behavior normal          Thought Content: Thought content normal        Laboratory results / Imaging reivewed: Hard copy in medical chart:     * XRAY (8/13/2020) to Right hand 3 views:  = no fracture  = no dislocation  = no soft tissue swelling    Current Medications:   All medications reviewed and updated in [address] [place] [address] [id number]  9/17/2020

## 2020-10-19 ENCOUNTER — NURSING HOME VISIT (OUTPATIENT)
Dept: GERIATRICS | Facility: OTHER | Age: 55
End: 2020-10-19
Payer: MEDICARE

## 2020-10-19 DIAGNOSIS — G47.59 OTHER PARASOMNIA: ICD-10-CM

## 2020-10-19 DIAGNOSIS — G89.29 CHRONIC PAIN OF RIGHT HAND: ICD-10-CM

## 2020-10-19 DIAGNOSIS — K59.01 SLOW TRANSIT CONSTIPATION: Chronic | ICD-10-CM

## 2020-10-19 DIAGNOSIS — M79.641 CHRONIC PAIN OF RIGHT HAND: ICD-10-CM

## 2020-10-19 DIAGNOSIS — G32.81 CEREBELLAR ATAXIA IN DISEASES CLASSIFIED ELSEWHERE (HCC): Primary | Chronic | ICD-10-CM

## 2020-10-19 DIAGNOSIS — F31.12 BIPOLAR 1 DISORDER WITH MODERATE MANIA (HCC): Chronic | ICD-10-CM

## 2020-10-19 PROCEDURE — 99309 SBSQ NF CARE MODERATE MDM 30: CPT | Performed by: NURSE PRACTITIONER

## 2020-10-19 RX ORDER — GUAIFENESIN 400 MG/1
400 TABLET ORAL
COMMUNITY
End: 2021-07-28 | Stop reason: ALTCHOICE

## 2020-10-19 RX ORDER — SENNA PLUS 8.6 MG/1
2 TABLET ORAL
COMMUNITY

## 2020-10-19 RX ORDER — ALBUTEROL SULFATE 2.5 MG/3ML
2.5 SOLUTION RESPIRATORY (INHALATION) EVERY 6 HOURS PRN
COMMUNITY

## 2020-10-26 ENCOUNTER — NURSING HOME VISIT (OUTPATIENT)
Dept: GERIATRICS | Facility: OTHER | Age: 55
End: 2020-10-26
Payer: MEDICARE

## 2020-10-26 DIAGNOSIS — F31.12 BIPOLAR 1 DISORDER WITH MODERATE MANIA (HCC): Primary | Chronic | ICD-10-CM

## 2020-10-26 PROCEDURE — 99308 SBSQ NF CARE LOW MDM 20: CPT | Performed by: INTERNAL MEDICINE

## 2020-11-18 ENCOUNTER — NURSING HOME VISIT (OUTPATIENT)
Dept: GERIATRICS | Facility: OTHER | Age: 55
End: 2020-11-18
Payer: MEDICARE

## 2020-11-18 DIAGNOSIS — K59.01 SLOW TRANSIT CONSTIPATION: Chronic | ICD-10-CM

## 2020-11-18 DIAGNOSIS — G32.81 CEREBELLAR ATAXIA IN DISEASES CLASSIFIED ELSEWHERE (HCC): Primary | Chronic | ICD-10-CM

## 2020-11-18 DIAGNOSIS — M65.4 DE QUERVAIN'S TENOSYNOVITIS, RIGHT: ICD-10-CM

## 2020-11-18 DIAGNOSIS — G47.59 OTHER PARASOMNIA: ICD-10-CM

## 2020-11-18 DIAGNOSIS — F31.12 BIPOLAR 1 DISORDER WITH MODERATE MANIA (HCC): Chronic | ICD-10-CM

## 2020-11-18 PROCEDURE — 99309 SBSQ NF CARE MODERATE MDM 30: CPT | Performed by: NURSE PRACTITIONER

## 2020-12-14 ENCOUNTER — NURSING HOME VISIT (OUTPATIENT)
Dept: GERIATRICS | Facility: OTHER | Age: 55
End: 2020-12-14
Payer: MEDICARE

## 2020-12-14 ENCOUNTER — TELEPHONE (OUTPATIENT)
Dept: OTHER | Facility: OTHER | Age: 55
End: 2020-12-14

## 2020-12-14 DIAGNOSIS — G82.50 QUADRIPLEGIA (HCC): Chronic | ICD-10-CM

## 2020-12-14 DIAGNOSIS — B34.9 ACUTE VIRAL SYNDROME: ICD-10-CM

## 2020-12-14 DIAGNOSIS — G32.81 CEREBELLAR ATAXIA IN DISEASES CLASSIFIED ELSEWHERE (HCC): Primary | Chronic | ICD-10-CM

## 2020-12-14 DIAGNOSIS — F31.12 BIPOLAR 1 DISORDER WITH MODERATE MANIA (HCC): Chronic | ICD-10-CM

## 2020-12-14 PROCEDURE — 99309 SBSQ NF CARE MODERATE MDM 30: CPT | Performed by: INTERNAL MEDICINE

## 2020-12-15 ENCOUNTER — NURSING HOME VISIT (OUTPATIENT)
Dept: GERIATRICS | Facility: OTHER | Age: 55
End: 2020-12-15
Payer: MEDICARE

## 2020-12-15 DIAGNOSIS — U07.1 COVID-19 VIRUS DETECTED: Primary | ICD-10-CM

## 2020-12-15 PROCEDURE — 99308 SBSQ NF CARE LOW MDM 20: CPT | Performed by: NURSE PRACTITIONER

## 2020-12-18 ENCOUNTER — NURSING HOME VISIT (OUTPATIENT)
Dept: GERIATRICS | Facility: OTHER | Age: 55
End: 2020-12-18
Payer: MEDICARE

## 2020-12-18 DIAGNOSIS — G82.50 QUADRIPLEGIA (HCC): Chronic | ICD-10-CM

## 2020-12-18 DIAGNOSIS — U07.1 COVID-19 VIRUS INFECTION: Primary | ICD-10-CM

## 2020-12-18 DIAGNOSIS — F31.12 BIPOLAR 1 DISORDER WITH MODERATE MANIA (HCC): Chronic | ICD-10-CM

## 2020-12-18 PROCEDURE — 99309 SBSQ NF CARE MODERATE MDM 30: CPT | Performed by: INTERNAL MEDICINE

## 2020-12-21 ENCOUNTER — NURSING HOME VISIT (OUTPATIENT)
Dept: GERIATRICS | Facility: OTHER | Age: 55
End: 2020-12-21
Payer: MEDICARE

## 2020-12-21 DIAGNOSIS — G82.50 QUADRIPLEGIA (HCC): Chronic | ICD-10-CM

## 2020-12-21 DIAGNOSIS — U07.1 COVID-19 VIRUS INFECTION: Primary | ICD-10-CM

## 2020-12-21 DIAGNOSIS — F31.12 BIPOLAR 1 DISORDER WITH MODERATE MANIA (HCC): Chronic | ICD-10-CM

## 2020-12-21 DIAGNOSIS — G32.81 CEREBELLAR ATAXIA IN DISEASES CLASSIFIED ELSEWHERE (HCC): Chronic | ICD-10-CM

## 2020-12-21 PROCEDURE — 99308 SBSQ NF CARE LOW MDM 20: CPT | Performed by: INTERNAL MEDICINE

## 2021-01-15 ENCOUNTER — NURSING HOME VISIT (OUTPATIENT)
Dept: GERIATRICS | Facility: OTHER | Age: 56
End: 2021-01-15
Payer: MEDICARE

## 2021-01-15 DIAGNOSIS — M65.4 DE QUERVAIN'S TENOSYNOVITIS, RIGHT: ICD-10-CM

## 2021-01-15 DIAGNOSIS — F31.12 BIPOLAR 1 DISORDER WITH MODERATE MANIA (HCC): Chronic | ICD-10-CM

## 2021-01-15 DIAGNOSIS — U07.1 COVID-19 VIRUS INFECTION: ICD-10-CM

## 2021-01-15 DIAGNOSIS — G32.81 CEREBELLAR ATAXIA IN DISEASES CLASSIFIED ELSEWHERE (HCC): Primary | Chronic | ICD-10-CM

## 2021-01-15 DIAGNOSIS — K59.01 SLOW TRANSIT CONSTIPATION: Chronic | ICD-10-CM

## 2021-01-15 PROCEDURE — 99309 SBSQ NF CARE MODERATE MDM 30: CPT | Performed by: NURSE PRACTITIONER

## 2021-01-15 NOTE — PROGRESS NOTES
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 [facility])  Stable  Continue 214/7 LTCF supportive care and management  Continue PT/OT/ST as needed  Fall precuation    Bipolar 1 disorder with moderate tavo ([address] )  Stable  Exhibits positive attitude and self awareness  Denies any appetite and sleep-wake cycle disturbance  Followed by Rina burr  Continue the following meds:  * Chlorpromazine 200mg at bedtime  * Clonazepam BID [0 5mg in AM/ 1 5mg at bedtime]  * Duloxetine 60mg BID  * Olanzapine 10mg at bedtime  * Valproic acid BID [250mg in AM/ 500mg at HS]  * Mirtazapine 7 5mg at bedtime  * Trazodone 100mg at bedtime  Patient presents to be tolerating multiple psychiatric medications  Renal and Hepatic functions WNL (12/29/2020)    Other parasomnia  Continue the following meds:  * Melatonin 3mg at bedtime  * Mirtazapine 7 5mg at bedtime  * Trazodone 100mg at bedtime    Slow transit constipation  Regular since on laxative  Continue Senna 2 tablets daily    De Quervain's tenosynovitis, right  Resolved per patient report  Continue to wear hand/ wrist splint support    COVID-19 virus infection  Deemed clinically stable and recovered at this time  Chief complaint / Reason for visit: Follow-up visit (30 day)    History of Present Illness: This is a 63 y o  Male patient admitted at [facility] for Anoxic Encephalopathy with Cerebella Ataxia  Patient is seen and examined today to follow-up acute and chronic medical conditions as mentioned above and Bipolar disorder, Parasomnia, Constipation and COVID-19 virus infection  Patient is OOB - alert, cooperative, not in distress  At baseline mentation and orientation - denies any acute medical concerns for this visit - reported " I feel fine"   Patient denies pain, chest pain, SOB, headache, cough/colds symptoms, GI/ related concerns, sleep/appetite concerns, malaise/fatigue, dizziness/ vertigo and any depressive mood  Patient recently tested positive for COVID-19 virus on 12/14/2020 with only mild upper respiratory symptoms  Presents to be back to baseline on this visit  Per nursing, no acute medical concerns for this visit  Review of Systems:  Per history of present illness, all other systems reviewed and negative  HISTORY:  Medical Hx: Reviewed, unchanged  Family Hx: Reviewed, unchanged  Soc Hx: Reviewed,  Unchanged    ALLERGY: No Known Allergies    PHYSICAL EXAM:  Vital Signs: T97 2F -P72 -R17 BP: 120/71 SpO2: 98% RA  Weight: 93 1 kgs (1/1/2021) <= 92 6 kgs (12/4/2020)    Physical Exam  Vitals signs and nursing note reviewed  Constitutional:       General: He is not in acute distress  Appearance: Normal appearance  He is normal weight  He is not ill-appearing, toxic-appearing or diaphoretic  HENT:      Head: Normocephalic and atraumatic  Right Ear: Tympanic membrane, ear canal and external ear normal  There is no impacted cerumen  Left Ear: Tympanic membrane, ear canal and external ear normal  There is no impacted cerumen  Nose: Nose normal  No congestion or rhinorrhea  Mouth/Throat:      Mouth: Mucous membranes are moist       Pharynx: Oropharynx is clear  No oropharyngeal exudate or posterior oropharyngeal erythema  Eyes:      General: No scleral icterus  Right eye: No discharge  Left eye: No discharge  Extraocular Movements: Extraocular movements intact  Conjunctiva/sclera: Conjunctivae normal       Pupils: Pupils are equal, round, and reactive to light  Comments: Wears glasses   Neck:      Musculoskeletal: Normal range of motion and neck supple  No neck rigidity or muscular tenderness  Vascular: No carotid bruit  Cardiovascular:      Rate and Rhythm: Normal rate and regular rhythm  Heart sounds: Normal heart sounds  No murmur  No friction rub  No gallop      Pulmonary:      Effort: Pulmonary effort is normal  No respiratory distress  Breath sounds: Normal breath sounds  No stridor  No wheezing, rhonchi or rales  Chest:      Chest wall: No tenderness  Abdominal:      General: Abdomen is flat  Bowel sounds are normal  There is no distension  Palpations: Abdomen is soft  There is no mass  Tenderness: There is no abdominal tenderness  There is no right CVA tenderness, left CVA tenderness, guarding or rebound  Hernia: No hernia is present  Genitourinary:     Comments: Non distended bladder  Continent and self transfers for toileting needs  Musculoskeletal:         General: No swelling, tenderness, deformity or signs of injury  Right lower leg: No edema  Left lower leg: No edema  Comments: Ambulatory dysfunction - uses manual wheel chair but can self transfer to bed => chair => toilet and vice versa  Lymphadenopathy:      Cervical: No cervical adenopathy  Skin:     General: Skin is warm  Capillary Refill: Capillary refill takes less than 2 seconds  Coloration: Skin is not jaundiced or pale  Findings: No bruising, erythema, lesion or rash  Neurological:      Mental Status: He is alert and oriented to person, place, and time  Mental status is at baseline  Psychiatric:         Mood and Affect: Mood normal          Behavior: Behavior normal          Thought Content: Thought content normal        Laboratory results / Imaging reviewed: Hard copies in medical chart:    * CMP (12/29/2020) = WNL except:  Glu: 116 (H)    * CBC with diff (12/29/2020) = WNL except:  MPV: 7 2 (L)      Current Medications:   All medications reviewed and updated in [address] [place] [address] [id number]  1/17/2021

## 2021-01-17 NOTE — ASSESSMENT & PLAN NOTE
Stable  Continue 214/7 LTCF supportive care and management  Continue PT/OT/ST as needed  Fall precuation

## 2021-01-17 NOTE — ASSESSMENT & PLAN NOTE
Stable  Exhibits positive attitude and self awareness  Denies any appetite and sleep-wake cycle disturbance    Followed by Rebecca burr  Continue the following meds:  * Chlorpromazine 200mg at bedtime  * Clonazepam BID [0 5mg in AM/ 1 5mg at bedtime]  * Duloxetine 60mg BID  * Olanzapine 10mg at bedtime  * Valproic acid BID [250mg in AM/ 500mg at HS]  * Mirtazapine 7 5mg at bedtime  * Trazodone 100mg at bedtime  Patient presents to be tolerating multiple psychiatric medications  Renal and Hepatic functions WNL (12/29/2020)

## 2021-01-17 NOTE — ASSESSMENT & PLAN NOTE
Continue the following meds:  * Melatonin 3mg at bedtime  * Mirtazapine 7 5mg at bedtime  * Trazodone 100mg at bedtime

## 2021-02-08 ENCOUNTER — NURSING HOME VISIT (OUTPATIENT)
Dept: GERIATRICS | Facility: OTHER | Age: 56
End: 2021-02-08
Payer: MEDICARE

## 2021-02-08 DIAGNOSIS — G32.81 CEREBELLAR ATAXIA IN DISEASES CLASSIFIED ELSEWHERE (HCC): Chronic | ICD-10-CM

## 2021-02-08 DIAGNOSIS — M25.531 RIGHT WRIST PAIN: ICD-10-CM

## 2021-02-08 DIAGNOSIS — F31.12 BIPOLAR 1 DISORDER WITH MODERATE MANIA (HCC): Chronic | ICD-10-CM

## 2021-02-08 DIAGNOSIS — G82.50 QUADRIPLEGIA (HCC): Primary | Chronic | ICD-10-CM

## 2021-02-08 DIAGNOSIS — Z86.16 HISTORY OF COVID-19: ICD-10-CM

## 2021-02-08 DIAGNOSIS — K59.01 SLOW TRANSIT CONSTIPATION: Chronic | ICD-10-CM

## 2021-02-08 PROBLEM — Z79.899 POLYPHARMACY: Status: RESOLVED | Noted: 2020-03-09 | Resolved: 2021-02-08

## 2021-02-08 PROBLEM — B34.9 ACUTE VIRAL SYNDROME: Status: RESOLVED | Noted: 2020-12-14 | Resolved: 2021-02-08

## 2021-02-08 PROCEDURE — 99309 SBSQ NF CARE MODERATE MDM 30: CPT | Performed by: INTERNAL MEDICINE

## 2021-02-08 NOTE — LETTER
February 8, 2021     [address] [facility] [address]  [place] [address]    Patient: Felicitas Lopez   YOB: 1965   Date of Visit: 2/8/2021       Dear Dr Ángela Dejesus: Thank you for referring Bryan Han to me for evaluation  Below are my notes for this consultation  If you have questions, please do not hesitate to call me  I look forward to following your patient along with you  Sincerely,        Kerline Govea MD        CC: No Recipients  Kerline Govea MD  2/8/2021  2:11 PM  Sign when Signing Visit  [address] [facility] [phone]  Location: [facility]  POS: [facility]    NAME: Felicitas Lopez  AGE: 63 y o  SEX: male    DATE OF ENCOUNTER: 2/8/2021    ASSESSMENT AND PROBLEMS:  1  Quadriplegia ([address] )  Assessment & Plan:   Stable  Continue supportive care      2  Bipolar 1 disorder with moderate tavo (HCC)  Assessment & Plan:   Stable  Continue chlorpromazine, clonazepam, duloxetine, mirtazapine, olanzapine, trazodone, valproic acid      3  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 [facility])  Assessment & Plan:    Stable  Continue supportive care      4  Slow transit constipation  Assessment & Plan:   Stable  Continue senna      5  Right wrist pain  Assessment & Plan:   Stable  Continue wrist brace      6  History of COVID-19  Assessment & Plan:   Appears to have recovered well  Asymptomatic        CHIEF COMPLAINT:  Monthly Visit    HISTORY OF PRESENT ILLNESS:  History taken from  Patient and nursing staff     cerebellar ataxia- continues with supportive care and assistance with basic activities of daily living including bathing, transferring  Bipolar 1 disorder - continues with chlorpromazine, clonazepam, duloxetine, mirtazapine, olanzapine, trazodone, valproic acid  Patient reports doing well  Nurses report stable mood        Constipation-continues with Senokot    Insomnia-continues with melatonin    Right wrist pain-continues with wrist brace which she reports functioning very well for him  REVIEW OF SYSTEMS:  Complete ROS negative other than as stated in HPI    HISTORY:  Medical Hx: Reviewed, unchanged  Family Hx: Reviewed, unchanged  Soc Hx: Reviewed, unchanged  No Known Allergies    PHYSICAL EXAM:  Vital Signs:   /69, HR 84, R 16, temp 96 8°  General: NAD  Eye Exam: anicteric sclera, no discharge  ENT: moist mucous membranes  Cardiovascular: regular rate, regular rhythm, no murmurs, rubs, or gallops, no edema noted  Pulmonary: no wheeze, no rhonchi  Abdominal: soft, nontender, nondistended, bowel sounds audible  Neurological:  Weakness in all 4 extremities  Skin: No significant lesions appreciated, no significant rashes appreciated    PERTINENT LABS/IMAGING DATA:   12/29/2020: Glucose 116, BUN 17, creatinine 0 87, sodium 140, potassium 4 0, hemoglobin 14 1, WBC 5 4, platelet 411    CURRENT MEDICATIONS:  All medications reviewed and updated in Nursing Home EMR      Cara Ricci MD MPH  2/8/2021 2:11 PM

## 2021-02-08 NOTE — PROGRESS NOTES
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)  Assessment & Plan:    Stable  Continue supportive care      4  Slow transit constipation  Assessment & Plan:   Stable  Continue senna      5  Right wrist pain  Assessment & Plan:   Stable  Continue wrist brace      6  History of COVID-19  Assessment & Plan:   Appears to have recovered well  Asymptomatic        CHIEF COMPLAINT:  Monthly Visit    HISTORY OF PRESENT ILLNESS:  History taken from  Patient and nursing staff     cerebellar ataxia- continues with supportive care and assistance with basic activities of daily living including bathing, transferring  Bipolar 1 disorder - continues with chlorpromazine, clonazepam, duloxetine, mirtazapine, olanzapine, trazodone, valproic acid  Patient reports doing well  Nurses report stable mood  Constipation-continues with Senokot    Insomnia-continues with melatonin    Right wrist pain-continues with wrist brace which she reports functioning very well for him      REVIEW OF SYSTEMS:  Complete ROS negative other than as stated in HPI    HISTORY:  Medical Hx: Reviewed, unchanged  Family Hx: Reviewed, unchanged  Soc Hx: Reviewed, unchanged  No Known Allergies    PHYSICAL EXAM:  Vital Signs:   /69, HR 84, R 16, temp 96 8°  General: NAD  Eye Exam: anicteric sclera, no discharge  ENT: moist mucous membranes  Cardiovascular: regular rate, regular rhythm, no murmurs, rubs, or gallops, no edema noted  Pulmonary: no wheeze, no rhonchi  Abdominal: soft, nontender, nondistended, bowel sounds audible  Neurological:  Weakness in all 4 extremities  Skin: No significant lesions appreciated, no significant rashes appreciated    PERTINENT LABS/IMAGING DATA:   12/29/2020: Glucose 116, BUN 17, creatinine 0 87, sodium 140, potassium 4 0, hemoglobin 14 1, WBC 5 4, platelet 001    CURRENT MEDICATIONS:  All medications reviewed and updated in Nursing Home EMR      Frances Huston MD MPH  2/8/2021 2:11 PM

## 2021-02-08 NOTE — ASSESSMENT & PLAN NOTE
Stable    Continue chlorpromazine, clonazepam, duloxetine, mirtazapine, olanzapine, trazodone, valproic acid

## 2021-04-06 ENCOUNTER — NURSING HOME VISIT (OUTPATIENT)
Dept: GERIATRICS | Facility: OTHER | Age: 56
End: 2021-04-06
Payer: MEDICARE

## 2021-04-06 DIAGNOSIS — G32.81 CEREBELLAR ATAXIA IN DISEASES CLASSIFIED ELSEWHERE (HCC): Primary | Chronic | ICD-10-CM

## 2021-04-06 DIAGNOSIS — K59.01 SLOW TRANSIT CONSTIPATION: Chronic | ICD-10-CM

## 2021-04-06 DIAGNOSIS — G47.59 OTHER PARASOMNIA: ICD-10-CM

## 2021-04-06 DIAGNOSIS — F31.12 BIPOLAR 1 DISORDER WITH MODERATE MANIA (HCC): Chronic | ICD-10-CM

## 2021-04-06 PROCEDURE — 99309 SBSQ NF CARE MODERATE MDM 30: CPT | Performed by: NURSE PRACTITIONER

## 2021-04-06 NOTE — PROGRESS NOTES
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 ([address] )  Stable  Continue 24/7 LTCF supportive care and management  Continue PT/OT/ST as needed  Fall precaution  Hx of recurrent falls      Bipolar 1 disorder with moderate tavo (HCC)  Stable  Denies any mood and behavior disturbance on this visit  Followed and managed by [facility] group  Continue the following meds:  * Chlorpromazine 200mg at bedtime  * Clonazepam BID [0 5mg in AM/ 1 5mg at bedtime]  * Duloxetine 60mg BID  * Olanzapine 10mg at bedtime  * Valproic acid BID [250mg in AM/ 500mg at HS]  * Mirtazapine 7 5mg at bedtime  * Trazodone 100mg at bedtime  Patient continues to tolerate multiple psychiatric medications  Continue Q6 months CMP checks  Next due on June, 2021     Other parasomnia  Reported sleep disturbance on this visit  - patient acknowledged disruption of sleep hygiene on the few nights  Discussed sleep hygiene and importance on keeping schedule  Continue the following meds:  * Melatonin 3mg at bedtime  * Mirtazapine 7 5mg at bedtime  * Trazodone 100mg at bedtime     Slow transit constipation  Patient reported daily bowel movements  Continue Senna 2 tablets daily     Chief complaint / Reason for visit: Routine Follow-up visit for Chronic medical conditions    History of Present Illness: This is a 63 y o  Male patient admitted at [facility] for Anoxic Encephalopathy with Cerebella Ataxia  Patient is seen and examined today to follow-up acute and chronic medical conditions as mentioned above and Bipolar disorder, Parasomnia and Constipation  Patient is in bed on this visit for a short nap - easily awakened and maintained alertness thereafter  Patient at baseline mentation and orientation  Reported sleep disturbance due to disruption of sleep hygiene scheduled on the last few night   Discussed keeping to sleep hygiene and reduce day time naps  Patient denies any mood and behavioral disturbance on this visit - and that he is keeping in contact with [employer] group via telephone  PHQ2: Negative  Per nursing, no acute medical concerns for this visit  Advised nursing to give HS medications between 9PM and 10PM - patient had mentioned that HS medications have been given earlier than usual     Review of Systems:  Per history of present illness, all other systems reviewed and negative  HISTORY:  Medical Hx: Reviewed, unchanged  Family Hx: Reviewed, unchanged  Soc Hx: Reviewed,  unchanged    ALLERGY: Reviewed, unchanged  No Known Allergies     PHYSICAL EXAM:  Vital Signs: T98 1F -P74 -R17 BP: 112/52 SpO2: 95% RA  Weight: 91 6 kgs (41/2021) <= 92 7 kgs (3/1/2021) <= 90 7 kgs (2/3/2021)    Physical Exam  Vitals signs and nursing note reviewed  Constitutional:       General: He is not in acute distress  Appearance: Normal appearance  He is normal weight  He is not ill-appearing, toxic-appearing or diaphoretic  HENT:      Head: Normocephalic and atraumatic  Right Ear: Tympanic membrane, ear canal and external ear normal  There is no impacted cerumen  Left Ear: Tympanic membrane, ear canal and external ear normal  There is no impacted cerumen  Nose: Nose normal  No congestion or rhinorrhea  Mouth/Throat:      Mouth: Mucous membranes are moist       Pharynx: Oropharynx is clear  No oropharyngeal exudate or posterior oropharyngeal erythema  Eyes:      General: No scleral icterus  Right eye: No discharge  Left eye: No discharge  Extraocular Movements: Extraocular movements intact  Conjunctiva/sclera: Conjunctivae normal       Pupils: Pupils are equal, round, and reactive to light  Comments: Wears glasses  Neck:      Musculoskeletal: Normal range of motion and neck supple  No neck rigidity or muscular tenderness  Vascular: No carotid bruit     Cardiovascular:      Rate and Rhythm: Normal rate and regular rhythm  Heart sounds: Normal heart sounds  No murmur  No friction rub  No gallop  Pulmonary:      Effort: Pulmonary effort is normal  No respiratory distress  Breath sounds: Normal breath sounds  No stridor  No wheezing, rhonchi or rales  Chest:      Chest wall: No tenderness  Abdominal:      General: Abdomen is flat  Bowel sounds are normal  There is no distension  Palpations: Abdomen is soft  There is no mass  Tenderness: There is no abdominal tenderness  There is no right CVA tenderness, left CVA tenderness, guarding or rebound  Hernia: No hernia is present  Genitourinary:     Comments: Non distended bladder  Continent and is able to self toilet  Musculoskeletal:         General: No swelling, tenderness, deformity or signs of injury  Right lower leg: No edema  Left lower leg: No edema  Comments: Ambulatory dysfunction  Wheelchair bound  Hx of recurrent falls  Lymphadenopathy:      Cervical: No cervical adenopathy  Skin:     General: Skin is warm  Capillary Refill: Capillary refill takes less than 2 seconds  Coloration: Skin is not jaundiced or pale  Findings: No bruising, erythema, lesion or rash  Comments: Skin intact  Neurological:      Mental Status: He is alert and oriented to person, place, and time  Mental status is at baseline  Psychiatric:         Mood and Affect: Mood normal          Behavior: Behavior normal          Thought Content: Thought content normal      Laboratory results / Imaging: No new lab results to review at this time after December, 2020 results    Current Medications:   All medications reviewed and updated in [address], CRNP  4/6/2021

## 2021-04-19 ENCOUNTER — NURSING HOME VISIT (OUTPATIENT)
Dept: GERIATRICS | Facility: OTHER | Age: 56
End: 2021-04-19
Payer: MEDICARE

## 2021-04-19 DIAGNOSIS — F31.12 BIPOLAR 1 DISORDER WITH MODERATE MANIA (HCC): Chronic | ICD-10-CM

## 2021-04-19 DIAGNOSIS — K21.9 GASTROESOPHAGEAL REFLUX DISEASE WITHOUT ESOPHAGITIS: Primary | ICD-10-CM

## 2021-04-19 DIAGNOSIS — G32.81 CEREBELLAR ATAXIA IN DISEASES CLASSIFIED ELSEWHERE (HCC): Chronic | ICD-10-CM

## 2021-04-19 PROCEDURE — 99309 SBSQ NF CARE MODERATE MDM 30: CPT | Performed by: INTERNAL MEDICINE

## 2021-04-19 RX ORDER — PANTOPRAZOLE SODIUM 40 MG/1
40 TABLET, DELAYED RELEASE ORAL DAILY
COMMUNITY
End: 2022-06-02

## 2021-04-19 NOTE — PROGRESS NOTES
[facility] [phone]  Location: [facility]  POS: 32 ([facility])    NAME: Irma July  AGE: 61 y o  SEX: male    DATE OF ENCOUNTER: 4/19/2021    ASSESSMENT AND PROBLEMS:  1  Gastroesophageal reflux disease without esophagitis  Assessment & Plan:  Likely with associated gastritis  Start pantoprazole 40 mg daily and monitor  2  Bipolar 1 disorder with moderate tavo (HCC)  Assessment & Plan:  Polypharmacy may contribute to gastritis, but unclear  Refer to [facility] for f/u and discussion of possible dose/medication reduction  Particularly valproic acid may contribute to gastritis  Recommend consideration of reducing evening dose and possible titration off valproic acid  3  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 [facility])  Assessment & Plan:  Neuromuscular symptoms stable  CHIEF COMPLAINT:  GERD    HISTORY OF PRESENT ILLNESS:  History taken from patient     GERD-reports having GERD symptoms after taking evening medications  He is worried about his evening medications are bit too much for stomach  He reports that the pain in the stomach last until the morning which makes it difficult for him to breakfast as well  Bipolar disorder-continues with clonazepam,  Duloxetine, mirtazapine, olanzapine, trazodone, valproic acid  Reports stable mood      REVIEW OF SYSTEMS:  Complete ROS negative other than as stated in HPI    HISTORY:  Medical Hx: Reviewed, unchanged  Family Hx: Reviewed, unchanged  Soc Hx: Reviewed, unchanged  No Known Allergies    PHYSICAL EXAM:  General: NAD  Eye Exam: anicteric sclera, no discharge  ENT: moist mucous membranes  Cardiovascular: regular rate, regular rhythm, no murmurs, rubs, or gallops  Pulmonary: no wheeze, no rhonchi  Abdominal: soft, nontender, nondistended, bowel sounds audible  Neurological: alert and responsive  Skin: No significant lesions appreciated, no significant rashes appreciated    CURRENT MEDICATIONS:  All medications reviewed and updated in Nursing Home EMR      Brook Rosario MD MPH  4/19/2021 12:39 PM

## 2021-04-19 NOTE — LETTER
April 19, 2021     [address] [facility] [address]  [facility] [address]    Patient: Azalea Tong   YOB: 1965   Date of Visit: 4/19/2021       Dear Dr Alaniz Angelo: Thank you for referring Delphine Elizabeth to me for evaluation  Below are my notes for this consultation  If you have questions, please do not hesitate to call me  I look forward to following your patient along with you  Sincerely,        Rodriguez Aparicio MD        CC: No Recipients  Rodriguez Aparicio MD  4/19/2021 12:39 PM  Sign when Signing Visit  64 [facility] [phone]  Location: [facility]  POS: 32 ([facility])    NAME: Azalea Tong  AGE: 61 y o  SEX: male    DATE OF ENCOUNTER: 4/19/2021    ASSESSMENT AND PROBLEMS:  1  Gastroesophageal reflux disease without esophagitis  Assessment & Plan:  Likely with associated gastritis  Start pantoprazole 40 mg daily and monitor  2  Bipolar 1 disorder with moderate tavo ([facility])  Assessment & Plan:  Polypharmacy may contribute to gastritis, but unclear  Refer to [facility] for f/u and discussion of possible dose/medication reduction  Particularly valproic acid may contribute to gastritis  Recommend consideration of reducing evening dose and possible titration off valproic acid  3  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 [facility])  Assessment & Plan:  Neuromuscular symptoms stable  CHIEF COMPLAINT:  GERD    HISTORY OF PRESENT ILLNESS:  History taken from patient     GERD-reports having GERD symptoms after taking evening medications  He is worried about his evening medications are bit too much for stomach  He reports that the pain in the stomach last until the morning which makes it difficult for him to breakfast as well  Bipolar disorder-continues with clonazepam,  Duloxetine, mirtazapine, olanzapine, trazodone, valproic acid  Reports stable mood      REVIEW OF SYSTEMS:  Complete ROS negative other than as stated in HPI    HISTORY:  Medical Hx: Reviewed, unchanged  Family Hx: Reviewed, unchanged  Soc Hx: Reviewed, unchanged  No Known Allergies    PHYSICAL EXAM:  General: NAD  Eye Exam: anicteric sclera, no discharge  ENT: moist mucous membranes  Cardiovascular: regular rate, regular rhythm, no murmurs, rubs, or gallops  Pulmonary: no wheeze, no rhonchi  Abdominal: soft, nontender, nondistended, bowel sounds audible  Neurological: alert and responsive  Skin: No significant lesions appreciated, no significant rashes appreciated    CURRENT MEDICATIONS:  All medications reviewed and updated in Nursing Home EMR      Staci Gabriel MD MPH  4/19/2021 12:39 PM

## 2021-04-19 NOTE — ASSESSMENT & PLAN NOTE
Polypharmacy may contribute to gastritis, but unclear  Refer to [facility] for f/u and discussion of possible dose/medication reduction  Particularly valproic acid may contribute to gastritis  Recommend consideration of reducing evening dose and possible titration off valproic acid

## 2021-06-01 ENCOUNTER — NURSING HOME VISIT (OUTPATIENT)
Dept: GERIATRICS | Facility: OTHER | Age: 56
End: 2021-06-01
Payer: MEDICARE

## 2021-06-01 DIAGNOSIS — B37.9 CANDIDA ALBICANS INFECTION: Primary | ICD-10-CM

## 2021-06-01 PROCEDURE — 99307 SBSQ NF CARE SF MDM 10: CPT | Performed by: NURSE PRACTITIONER

## 2021-06-01 NOTE — PROGRESS NOTES
[address], [address] [place] [address]  [phone]    NAME: Shanon Law  AGE: 61 y o  SEX: male    Progress Note    Location: [address] [place] [address]  POS: 32 ([facility])    Assessment/Plan:    Candida albicans infection  Start Nystatin 100,000 units powder BID x 2 weeks  = apply to in between and underside of toes of Left foot  Start cornstarch powder daily to same site after completing Nystatin powder      Chief complaint / Reason for visit: Acute visit    History of Present Illness: This is a 61 y o  Male patient admitted at [facility] for Anoxic Encephalopathy with Cerebella Ataxia  Patient is seen and examined today per nursing request related to patient complains of pain to right foot  Patient is out of bed for this visit sitting in manual wheelchair in room - alert, cooperative, and not in distress  Patient at baseline mentation and orientation and this visit  Patient reported discomfort to left foot during ambulation and therapy sessions  On assessment, note noted macerated skin underneath the left foot toes and in between toes  Toes of both feet contracted encouraging moisture on the underside of toes  Left foot within normal limits  Strongly suspicious of Candida albicans infection  Review of Systems:  Per history of present illness, all other systems reviewed and negative  HISTORY:  Medical Hx: Reviewed, unchanged  Family Hx: Reviewed, unchanged  Soc Hx: Reviewed,  unchanged    ALLERGY: Reviewed, unchanged  No Known Allergies     PHYSICAL EXAM:  Vital Signs: T98 0F    General: NAD  Extremities and skin: no edema noted, no rashes  Macerated skin on underside left toes  Neurological: alert, cooperative and responsive, Oriented x 3, moving all 4 extremities symmetrically    Laboratory results / Imaging reviewed: No new lab results to review at this time  Current Medications:   All medications reviewed and updated in [address], ADAM  6/1/2021

## 2021-06-01 NOTE — ASSESSMENT & PLAN NOTE
Start Nystatin 100,000 units powder BID x 2 weeks  = apply to in between and underside of toes of Left foot    Start cornstarch powder daily to same site after completing Nystatin powder

## 2021-06-03 ENCOUNTER — NURSING HOME VISIT (OUTPATIENT)
Dept: GERIATRICS | Facility: OTHER | Age: 56
End: 2021-06-03
Payer: MEDICARE

## 2021-06-03 DIAGNOSIS — G32.81 CEREBELLAR ATAXIA IN DISEASES CLASSIFIED ELSEWHERE (HCC): Primary | Chronic | ICD-10-CM

## 2021-06-03 DIAGNOSIS — K59.01 SLOW TRANSIT CONSTIPATION: Chronic | ICD-10-CM

## 2021-06-03 DIAGNOSIS — K21.9 GASTROESOPHAGEAL REFLUX DISEASE WITHOUT ESOPHAGITIS: Chronic | ICD-10-CM

## 2021-06-03 DIAGNOSIS — F31.12 BIPOLAR 1 DISORDER WITH MODERATE MANIA (HCC): Chronic | ICD-10-CM

## 2021-06-03 PROCEDURE — 99309 SBSQ NF CARE MODERATE MDM 30: CPT | Performed by: INTERNAL MEDICINE

## 2021-06-03 NOTE — LETTER
Analy 3, 2021     [address] [facility] [address]  [place] [address]    Patient: Hermelinda Ramos   YOB: 1965   Date of Visit: 6/3/2021       Dear Dr Mer Luz: Thank you for referring Linda Sharif to me for evaluation  Below are my notes for this consultation  If you have questions, please do not hesitate to call me  I look forward to following your patient along with you  Sincerely,        Tanner Leon MD        CC: No Recipients  Tanner Leon MD  6/3/2021  1:57 PM  Incomplete  FOLLOW UP VISIT [facility]it [facility] [phone]  Location: 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 [facility])  Assessment & Plan:  Stable  Continue supportive care  2  Bipolar 1 disorder with moderate tavo ([facility])  Assessment & Plan:  Stable  Continue olanzapine, mirtazapine, duloxetine, clonazepam, chlorpromazine, valproic acid, trazodone  Stable symptoms  3  Gastroesophageal reflux disease without esophagitis  Assessment & Plan:  Stable  Continue current pantoprazole      4  Slow transit constipation  Assessment & Plan:  Stable  Continue Senokot        CHIEF COMPLAINT:  Monthly Visit    HISTORY OF PRESENT ILLNESS:  History taken from patient and nursing staff    Cerebellar ataxia secondary to history of anoxic brain injury-continues with supportive care    Bipolar 1 disorder-continues with olanzapine, mirtazapine, duloxetine, clonazepam, chlorpromazine, valproic acid, trazodone  Nurses report stable status  Patient denies complaints  GERD-continues with pantoprazole 40 mg daily  Denies reflux symptoms currently      REVIEW OF SYSTEMS:  Complete ROS negative other than as stated in HPI    HISTORY:  Medical Hx: Reviewed, unchanged  Family Hx: Reviewed, unchanged  Soc Hx: Reviewed, unchanged  No Known Allergies    PHYSICAL EXAM:  General: NAD  Eye Exam: anicteric sclera, no discharge  ENT: moist mucous membranes  Cardiovascular: regular rate, regular rhythm, no murmurs, rubs, or gallops, no edema noted  Pulmonary: no wheeze, no rhonchi  Abdominal: soft, nontender, nondistended, bowel sounds audible  Neurological: poor coordination of all 4 extremities  Skin: No significant lesions appreciated, no significant rashes appreciated    PERTINENT LABS/IMAGING DATA:  12/29/2020: Glucose 116, creatinine 0 87, sodium 140, protein 7 0, AST 17, ALT 40, hemoglobin 14 1, WBC 5 4, platelet 066    CURRENT MEDICATIONS:  All medications reviewed and updated in Nursing Home EMR      Ana Lopez MD MPH  6/3/2021 1:57 PM

## 2021-06-03 NOTE — PROGRESS NOTES
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 [facility])  Assessment & Plan:  Stable  Continue supportive care  2  Bipolar 1 disorder with moderate tavo (HCC)  Assessment & Plan:  Stable  Continue olanzapine, mirtazapine, duloxetine, clonazepam, chlorpromazine, valproic acid, trazodone  Stable symptoms  3  Gastroesophageal reflux disease without esophagitis  Assessment & Plan:  Stable  Continue current pantoprazole      4  Slow transit constipation  Assessment & Plan:  Stable  Continue Senokot        CHIEF COMPLAINT:  Monthly Visit    HISTORY OF PRESENT ILLNESS:  History taken from patient and nursing staff    Cerebellar ataxia secondary to history of anoxic brain injury-continues with supportive care    Bipolar 1 disorder-continues with olanzapine, mirtazapine, duloxetine, clonazepam, chlorpromazine, valproic acid, trazodone  Nurses report stable status  Patient denies complaints  GERD-continues with pantoprazole 40 mg daily  Denies reflux symptoms currently  REVIEW OF SYSTEMS:  Complete ROS negative other than as stated in HPI    HISTORY:  Medical Hx: Reviewed, unchanged  Family Hx: Reviewed, unchanged  Soc Hx: Reviewed, unchanged  No Known Allergies    PHYSICAL EXAM:  General: NAD  Eye Exam: anicteric sclera, no discharge  ENT: moist mucous membranes  Cardiovascular: regular rate, regular rhythm, no murmurs, rubs, or gallops, no edema noted  Pulmonary: no wheeze, no rhonchi  Abdominal: soft, nontender, nondistended, bowel sounds audible  Neurological: poor coordination of all 4 extremities    Skin: No significant lesions appreciated, no significant rashes appreciated    PERTINENT LABS/IMAGING DATA:  12/29/2020: Glucose 116, creatinine 0 87, sodium 140, protein 7 0, AST 17, ALT 40, hemoglobin 14 1, WBC 5 4, platelet 238    CURRENT MEDICATIONS:  All medications reviewed and updated in Nursing Home EMR      Alicia Ko MD MPH  6/3/2021 1:57 PM

## 2021-06-03 NOTE — ASSESSMENT & PLAN NOTE
Stable  Continue olanzapine, mirtazapine, duloxetine, clonazepam, chlorpromazine, valproic acid, trazodone  Stable symptoms

## 2021-07-28 ENCOUNTER — NURSING HOME VISIT (OUTPATIENT)
Dept: GERIATRICS | Facility: OTHER | Age: 56
End: 2021-07-28
Payer: MEDICARE

## 2021-07-28 DIAGNOSIS — K21.9 GASTROESOPHAGEAL REFLUX DISEASE WITHOUT ESOPHAGITIS: Chronic | ICD-10-CM

## 2021-07-28 DIAGNOSIS — K59.01 SLOW TRANSIT CONSTIPATION: Chronic | ICD-10-CM

## 2021-07-28 DIAGNOSIS — F31.12 BIPOLAR 1 DISORDER WITH MODERATE MANIA (HCC): Chronic | ICD-10-CM

## 2021-07-28 DIAGNOSIS — G47.59 OTHER PARASOMNIA: ICD-10-CM

## 2021-07-28 DIAGNOSIS — G32.81 CEREBELLAR ATAXIA IN DISEASES CLASSIFIED ELSEWHERE (HCC): Primary | Chronic | ICD-10-CM

## 2021-07-28 PROCEDURE — 99309 SBSQ NF CARE MODERATE MDM 30: CPT | Performed by: NURSE PRACTITIONER

## 2021-07-28 RX ORDER — UREA 10 %
1 LOTION (ML) TOPICAL DAILY
COMMUNITY

## 2021-07-28 RX ORDER — POLYVINYL ALCOHOL 14 MG/ML
2 SOLUTION/ DROPS OPHTHALMIC 4 TIMES DAILY
COMMUNITY

## 2021-07-28 NOTE — PROGRESS NOTES
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 ([facility] [address] )  Stable  Continue 24/7 LTCF supportive care and management  Continue PT/OT/ST as needed  Fall precaution  Hx of recurrent falls      Bipolar 1 disorder with moderate tavo (HCC)  Stable  Denies any mood and behavior disturbance on this visit  Followed with medication management by [facility]  Currently on the following meds:  * Chlorpromazine 200mg at bedtime  * Clonazepam BID [0 5mg in AM/ 1 5mg at bedtime]  * Duloxetine 60mg BID  * Olanzapine 10mg at bedtime  * Valproic acid BID [250mg in AM/ 500mg at HS]  * Mirtazapine 7 5mg at bedtime  * Trazodone 100mg at bedtime  Renal and hepatic functions WNL (6/15/2021)  Continue Q6 months CMP checks  Next due on December, 2021     Other parasomnia  Stable  Denies any sleep concerns on this visit  Continue the following meds:  * Melatonin 3mg at bedtime  * Mirtazapine 7 5mg at bedtime     Slow transit constipation  Continent and self toilets  Reported regular daily bowel movements  Continue Senna 2 tablets daily    GERD  Asymptomatic  Continue Pantoprazole 40mg daily    Chief complaint / Reason for visit: Routine Follow-up visit for Chronic medical conditions    History of Present Illness: This is a 61 y o  Male patient admitted at [facility] for Anoxic Encephalopathy with Cerebella Ataxia  Patient is seen and examined today to follow-up acute and chronic medical conditions as mentioned above and Bipolar disorder, Parasomnia and Constipation  Patient is out of bed for this visit - actively participating in therapy and TR activities today  Patient is alert, cooperative, calm, pleasant and not in distress    Patient is verbal with clear coherent speech - oriented to name/birthday/current date and place  Patient acknowledge feeling well today, "I am all right" - denies any acute medical concerns during ROS assessment  Per nursing no acute medical concerns - had a fall incident weight back in June without any trauma or change in mentation    Review of Systems:  Per history of present illness, all other systems reviewed and negative  HISTORY:  Medical Hx: Reviewed, unchanged  Family Hx: Reviewed, unchanged  Soc Hx: Reviewed,  unchanged    ALLERGY: Reviewed, unchanged  No Known Allergies     PHYSICAL EXAM:  Vital Signs: T97 4F -P73 -R16 BP: 128/74 SpO2: 98% RA  Weight: 92 1 kgs (7/1/2021) <= 92 2 kgs (6/2/2021) <= 92 0 kgs (5/3/2021)    General: NAD  Head: Atraumatic  Normocephalic  Eye Exam: anicteric sclera, no discharge, PERRLA, No injection  Oral Exam: moist mucous membranes, no buccaloropharyngeal erythema, palatine tonsils WNL  Neck Exam: no anterior cervical lymphadenopathy noted, neck supple  Cardiovascular: regular rate, regular rhythm, no murmurs, rubs, or gallops  Pulmonary: no wheeze, no rhonchi, no rales  No chest tenderness  Abdominal: soft, non-tender, nondistended, bowel sounds audible x 4 quadrants  : Non distended bladder  Continent  Extremities and skin: no edema noted, no rashes  Intact skin  Neurological: alert, cooperative and responsive, Oriented x 3, moving all 4 extremities symmetrically  Ambulatory dysfunction - wheelchair bound  Psych: PHQ2: Negative  Laboratory results / Imaging reviewed: Hard copy/ies in medical chart:    * CMP (6/15/2021) = WNL     * CBC with diff (6/15/2021) = WNL    Current Medications: All medications reviewed and updated in Nursing Home Chart    Please note:  Voice-recognition software may have been used in the preparation of this document  Occasional wrong word or "sound-alike" substitutions may have occurred due to the inherent limitations of voice recognition software   Interpretation should be guided by ADAM Soliman  7/28/2021

## 2021-09-20 ENCOUNTER — NURSING HOME VISIT (OUTPATIENT)
Dept: GERIATRICS | Facility: OTHER | Age: 56
End: 2021-09-20
Payer: MEDICARE

## 2021-09-20 DIAGNOSIS — K21.9 GASTROESOPHAGEAL REFLUX DISEASE WITHOUT ESOPHAGITIS: Chronic | ICD-10-CM

## 2021-09-20 DIAGNOSIS — K59.01 SLOW TRANSIT CONSTIPATION: Chronic | ICD-10-CM

## 2021-09-20 DIAGNOSIS — G32.81 CEREBELLAR ATAXIA IN DISEASES CLASSIFIED ELSEWHERE (HCC): Primary | Chronic | ICD-10-CM

## 2021-09-20 DIAGNOSIS — F31.12 BIPOLAR 1 DISORDER WITH MODERATE MANIA (HCC): Chronic | ICD-10-CM

## 2021-09-20 PROCEDURE — 99309 SBSQ NF CARE MODERATE MDM 30: CPT | Performed by: INTERNAL MEDICINE

## 2021-09-20 NOTE — PROGRESS NOTES
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 [facility])  Assessment & Plan:  Stable  Continue with supportive care      2  Bipolar 1 disorder with moderate tavo (HCC)  Assessment & Plan:  Stable  Continue chlorpromazine, clonazepam, duloxetine, mirtazapine, olanzapine, trazodone, valproic acid      3  Slow transit constipation  Assessment & Plan:  Stable  Continue senna      4  Gastroesophageal reflux disease without esophagitis  Assessment & Plan:  Stable  Continue pantoprazole        CHIEF COMPLAINT:  Monthly Visit    HISTORY OF PRESENT ILLNESS:  History taken from patient and nursing staff    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-nursing staff reports stable neuro muscular status  Continues with supportive care     Bipolar disorder-continues with clonazepam, valproic acid, trazodone, olanzapine, mirtazapine, duloxetine, chlorpromazine  Nurses report stable status        Quadriplegia-continues with supportive care     GERD-continues with pantoprazole daily      REVIEW OF SYSTEMS:  Complete ROS negative other than as stated in HPI    HISTORY:  Medical Hx: Reviewed, unchanged  Family Hx: Reviewed, unchanged  Soc Hx: Reviewed, unchanged  No Known Allergies    PHYSICAL EXAM:  Vital Signs:  /92, temp 97 4°, HR 84, weight 195 6 lb, R 16, O2 96% on room air  General: NAD, sitting in wheelchair  Eye Exam: anicteric sclera, no discharge  ENT: moist mucous membranes  Cardiovascular: regular rate, regular rhythm, no murmurs, rubs, or gallops, no edema noted  Pulmonary: no wheeze, no rhonchi  Abdominal: soft, nontender, nondistended, bowel sounds audible  Neurological: , alert awake, poor coordination of all 4 extremities  Skin: No significant lesions appreciated, no significant rashes appreciated    PERTINENT LABS/IMAGING DATA:  06/15/2020:  Hemoglobin 14 6, WBC 5 0, platelet 138, glucose 98, creatinine 0 84, sodium 140, potassium 4 0, AST 16, ALT 28    CURRENT MEDICATIONS:  All medications reviewed and updated in Nursing Home EMR      Bonita Blair MD MPH  9/20/2021 6:07 PM

## 2021-11-15 ENCOUNTER — NURSING HOME VISIT (OUTPATIENT)
Dept: GERIATRICS | Facility: OTHER | Age: 56
End: 2021-11-15
Payer: MEDICARE

## 2021-11-15 DIAGNOSIS — K21.9 GASTROESOPHAGEAL REFLUX DISEASE WITHOUT ESOPHAGITIS: Chronic | ICD-10-CM

## 2021-11-15 DIAGNOSIS — F31.12 BIPOLAR 1 DISORDER WITH MODERATE MANIA (HCC): Chronic | ICD-10-CM

## 2021-11-15 DIAGNOSIS — G32.81 CEREBELLAR ATAXIA IN DISEASES CLASSIFIED ELSEWHERE (HCC): Primary | Chronic | ICD-10-CM

## 2021-11-15 PROBLEM — R42 DIZZINESS: Status: RESOLVED | Noted: 2020-07-27 | Resolved: 2021-11-15

## 2021-11-15 PROBLEM — M25.531 RIGHT WRIST PAIN: Status: RESOLVED | Noted: 2020-05-07 | Resolved: 2021-11-15

## 2021-11-15 PROBLEM — M79.641 CHRONIC PAIN OF RIGHT HAND: Status: RESOLVED | Noted: 2020-08-12 | Resolved: 2021-11-15

## 2021-11-15 PROBLEM — G89.29 CHRONIC PAIN OF RIGHT HAND: Status: RESOLVED | Noted: 2020-08-12 | Resolved: 2021-11-15

## 2021-11-15 PROCEDURE — 99309 SBSQ NF CARE MODERATE MDM 30: CPT | Performed by: INTERNAL MEDICINE

## 2021-11-29 ENCOUNTER — NURSING HOME VISIT (OUTPATIENT)
Dept: GERIATRICS | Facility: OTHER | Age: 56
End: 2021-11-29
Payer: MEDICARE

## 2021-11-29 DIAGNOSIS — F43.21 GRIEF REACTION: Primary | ICD-10-CM

## 2021-11-29 PROBLEM — F43.20 GRIEF REACTION: Status: ACTIVE | Noted: 2021-11-29

## 2021-11-29 PROCEDURE — 99308 SBSQ NF CARE LOW MDM 20: CPT | Performed by: INTERNAL MEDICINE

## 2021-12-06 ENCOUNTER — NURSING HOME VISIT (OUTPATIENT)
Dept: GERIATRICS | Facility: OTHER | Age: 56
End: 2021-12-06
Payer: MEDICARE

## 2021-12-06 DIAGNOSIS — F43.21 GRIEF REACTION: ICD-10-CM

## 2021-12-06 DIAGNOSIS — F31.12 BIPOLAR 1 DISORDER WITH MODERATE MANIA (HCC): Primary | Chronic | ICD-10-CM

## 2021-12-06 DIAGNOSIS — J01.90 ACUTE NON-RECURRENT SINUSITIS, UNSPECIFIED LOCATION: ICD-10-CM

## 2021-12-06 PROCEDURE — 99308 SBSQ NF CARE LOW MDM 20: CPT | Performed by: INTERNAL MEDICINE

## 2022-01-10 ENCOUNTER — NURSING HOME VISIT (OUTPATIENT)
Dept: GERIATRICS | Facility: OTHER | Age: 57
End: 2022-01-10
Payer: MEDICARE

## 2022-01-10 DIAGNOSIS — K21.9 GASTROESOPHAGEAL REFLUX DISEASE WITHOUT ESOPHAGITIS: Chronic | ICD-10-CM

## 2022-01-10 DIAGNOSIS — F31.12 BIPOLAR 1 DISORDER WITH MODERATE MANIA (HCC): Chronic | ICD-10-CM

## 2022-01-10 DIAGNOSIS — G32.81 CEREBELLAR ATAXIA IN DISEASES CLASSIFIED ELSEWHERE (HCC): Primary | Chronic | ICD-10-CM

## 2022-01-10 PROCEDURE — 99309 SBSQ NF CARE MODERATE MDM 30: CPT | Performed by: INTERNAL MEDICINE

## 2022-01-11 NOTE — PROGRESS NOTES
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 [facility])  Assessment & Plan:  Stable  Continue supportive care      2  Bipolar 1 disorder with moderate tavo (HCC)  Assessment & Plan:  Stable  Continue chlorpromazine, clonazepam, duloxetine, mirtazapine, olanzapine, trazodone, valproic acid      3  Gastroesophageal reflux disease without esophagitis  Assessment & Plan:  Stable  Continue pantoprazole        CHIEF COMPLAINT:  Monthly Visit    HISTORY OF PRESENT ILLNESS:  History taken from patient    Cerebellar ataxia-continues with supportive care  Reports stable status  Bipolar disorder-continues with chlorpromazine, clonazepam, duloxetine, mirtazapine, olanzapine, trazodone, valproic acid    Patient and nursing staff reports stable status     GERD-continues with pantoprazole     Insomnia continues with melatonin    REVIEW OF SYSTEMS:  Complete ROS negative other than as stated in HPI    HISTORY:  Medical Hx: Reviewed, unchanged  Family Hx: Reviewed, unchanged  Soc Hx: Reviewed, unchanged  No Known Allergies    PHYSICAL EXAM:  Vital Signs:  /64, temp 98 1°, HR 65, RR 18, O2 98% on room air  General: NAD, sitting in wheelchair  Eye Exam: anicteric sclera, no discharge  ENT: moist mucous membranes  Cardiovascular: regular rate, regular rhythm, no murmurs, rubs, or gallops, no edema noted  Pulmonary: no wheeze, no rhonchi  Abdominal: soft, nontender, nondistended, bowel sounds audible  Neurological:  Weakness in all 4 extremities  Skin: No significant lesions appreciated, no significant rashes appreciated    PERTINENT LABS/IMAGING DATA:  12/14/2021: Glucose 119, BUN 12, creatinine 0 83, sodium 139, potassium 4 1, AST 45, ALT 50, hemoglobin 13 4, WBC 4 6, platelets 153    CURRENT MEDICATIONS:  All medications reviewed and updated in Nursing Home EMR      Ayde Matos MD MPH  1/10/2022 9:47 PM

## 2022-03-08 ENCOUNTER — NURSING HOME VISIT (OUTPATIENT)
Dept: GERIATRICS | Facility: OTHER | Age: 57
End: 2022-03-08
Payer: MEDICARE

## 2022-03-08 DIAGNOSIS — K21.9 GASTROESOPHAGEAL REFLUX DISEASE WITHOUT ESOPHAGITIS: Chronic | ICD-10-CM

## 2022-03-08 DIAGNOSIS — F31.12 BIPOLAR 1 DISORDER WITH MODERATE MANIA (HCC): Chronic | ICD-10-CM

## 2022-03-08 DIAGNOSIS — G32.81 CEREBELLAR ATAXIA IN DISEASES CLASSIFIED ELSEWHERE (HCC): Primary | Chronic | ICD-10-CM

## 2022-03-08 DIAGNOSIS — K59.01 SLOW TRANSIT CONSTIPATION: Chronic | ICD-10-CM

## 2022-03-08 PROBLEM — F43.20 GRIEF REACTION: Status: RESOLVED | Noted: 2021-11-29 | Resolved: 2022-03-08

## 2022-03-08 PROBLEM — F43.21 GRIEF REACTION: Status: RESOLVED | Noted: 2021-11-29 | Resolved: 2022-03-08

## 2022-03-08 PROCEDURE — 99309 SBSQ NF CARE MODERATE MDM 30: CPT | Performed by: INTERNAL MEDICINE

## 2022-03-09 NOTE — PROGRESS NOTES
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)  Assessment & Plan:  Stable  Continue supportive care      2  Bipolar 1 disorder with moderate tavo (HCC)  Assessment & Plan:  Continue current chlorpromazine, clonazepam, duloxetine, mirtazapine, olanzapine, trazodone, valproic acid  Will need follow-up with Psychiatry to review poor sleep at night  Likely will need medications adjusted  Could consider stopping low-dose mirtazapine and reduce evening clonazepam dosing      3  Slow transit constipation  Assessment & Plan:  Stable  Continue Senokot therapy      4  Gastroesophageal reflux disease without esophagitis  Assessment & Plan:  Stable  Continue pantoprazole        CHIEF COMPLAINT:  Monthly Visit    HISTORY OF PRESENT ILLNESS:  History taken from patient    Cerebellar ataxia-continues with supportive care     Bipolar disorder-continues with chlorpromazine, clonazepam, duloxetine, mirtazapine, olanzapine, trazodone, valproic acid  Patient reports difficulty sleeping at night  Reports stable mood      GERD-continues with pantoprazole daily    Constipation-continues with senna    REVIEW OF SYSTEMS:  Complete ROS negative other than as stated in HPI    HISTORY:  Medical Hx: Reviewed, unchanged  Family Hx: Reviewed, unchanged  Soc Hx: Reviewed, unchanged  No Known Allergies    PHYSICAL EXAM:  Vital Signs:  /63, temp 97 5°, HR 71, RR 16, O2 95% on room air  General: NAD, sitting wheelchair  Eye Exam: anicteric sclera, no discharge  ENT: moist mucous membranes  Cardiovascular: regular rate, regular rhythm, no murmurs, rubs, or gallops, no edema noted  Pulmonary: no wheeze, no rhonchi  Abdominal: soft, nontender, nondistended, bowel sounds audible  Neurological:  Poor coordination of all 4 extremities  Skin: No significant lesions appreciated, no significant rashes appreciated    PERTINENT LABS/IMAGING DATA:  02/16/2022: COVID-19 PCR negative  12/14/2021: Glucose 119, creatinine 0 83, sodium 139, potassium 4 1, AST 45, ALT 50, hemoglobin 13 4, WBC 4 6, platelet 115    CURRENT MEDICATIONS:  All medications reviewed and updated in Nursing Home EMR      Katelyn Hodges MD MPH  3/8/2022 9:39 PM

## 2022-03-09 NOTE — ASSESSMENT & PLAN NOTE
Continue current chlorpromazine, clonazepam, duloxetine, mirtazapine, olanzapine, trazodone, valproic acid  Will need follow-up with Psychiatry to review poor sleep at night  Likely will need medications adjusted    Could consider stopping low-dose mirtazapine and reduce evening clonazepam dosing

## 2022-03-14 ENCOUNTER — NURSING HOME VISIT (OUTPATIENT)
Dept: GERIATRICS | Facility: OTHER | Age: 57
End: 2022-03-14
Payer: MEDICARE

## 2022-03-14 DIAGNOSIS — M79.642 LEFT HAND PAIN: Primary | ICD-10-CM

## 2022-03-14 PROCEDURE — 99308 SBSQ NF CARE LOW MDM 20: CPT | Performed by: INTERNAL MEDICINE

## 2022-03-15 NOTE — ASSESSMENT & PLAN NOTE
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

## 2022-03-15 NOTE — PROGRESS NOTES
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, exacerbated by manual wheelchair use  CHIEF COMPLAINT:  Hand pain    HISTORY OF PRESENT ILLNESS:  History taken from patient    Left hand pain-patient reports pain in the left hand with exercise, particularly when mobilizing in his wheelchair  Denies pain at rest   He reports some pain when he is clenching of fist and then opening his and particularly in his fingers  His also he wrote some clicking in his joints and a trigger finger in his middle finger  Reports having wrist and hand pain of the right wrist and hand before but that this pain seems different  REVIEW OF SYSTEMS:  Complete ROS negative other than as stated in HPI    HISTORY:  Medical Hx: Reviewed, unchanged  Family Hx: Reviewed, unchanged  Soc Hx: Reviewed, unchanged  No Known Allergies    PHYSICAL EXAM:  Vital Signs:  /63, temp 97 5°, HR 71, R 16, O2 95% on room air  General: NAD, sitting wheelchair  Orthopedic:  Left hand without erythema or tenderness  Mild trigger finger of middle finger of left hand present  No major joint deformity noted  Neurological: alert and responsive  Skin: No significant lesions appreciated, no significant rashes appreciated    CURRENT MEDICATIONS:  All medications reviewed and updated in Nursing Home EMR      Antionette Bucio MD MPH  3/14/2022 9:04 PM

## 2022-04-04 ENCOUNTER — NURSING HOME VISIT (OUTPATIENT)
Dept: GERIATRICS | Facility: OTHER | Age: 57
End: 2022-04-04
Payer: MEDICARE

## 2022-04-04 DIAGNOSIS — F31.12 BIPOLAR 1 DISORDER WITH MODERATE MANIA (HCC): Primary | Chronic | ICD-10-CM

## 2022-04-04 PROCEDURE — 99308 SBSQ NF CARE LOW MDM 20: CPT | Performed by: INTERNAL MEDICINE

## 2022-04-04 RX ORDER — MIRTAZAPINE 15 MG/1
15 TABLET, FILM COATED ORAL
COMMUNITY

## 2022-04-04 NOTE — PROGRESS NOTES
FOLLOW UP VISIT  Location: [facility] [place]  POS: 32 ([facility])    NAME: Silvia Cameron  AGE: 61 y o  SEX: male    DATE OF ENCOUNTER: 4/4/2022    ASSESSMENT AND PROBLEMS:  1  Bipolar 1 disorder with moderate tavo (HCC)  Assessment & Plan:  Continue chlorpromazine, clonazepam, duloxetine, olanzapine, trazodone, valproic acid  Increase mirtazapine to 15 mg every evening as per Psychiatry  CHIEF COMPLAINT:  Sleeping issue    HISTORY OF PRESENT ILLNESS:  History taken from patient    Sleeping issue-patient reports that he continues to have difficulty with sleeping recently saw Psychiatry  Psychiatry no reviewed which suggested increasing mirtazapine dose  Of note medication list from Psychiatry does not match patient is currently prescribed medications despite providing list to the office a couple of days prior to his visit however mirtazapine dosing was correct  REVIEW OF SYSTEMS:  Complete ROS negative other than as stated in HPI    HISTORY:  Medical Hx: Reviewed, unchanged  Family Hx: Reviewed, unchanged  Soc Hx: Reviewed, unchanged  No Known Allergies    PHYSICAL EXAM:  Vital Signs:  /82, temp 97 1°, HR 68, R 16, O2 98% on room air  General: NAD, sitting wheelchair  Eye Exam: anicteric sclera, no discharge  ENT: moist mucous membranes  Cardiovascular: regular rate, regular rhythm, no murmurs, rubs, or gallops  Pulmonary: no wheeze, no rhonchi  Abdominal: soft, nontender, nondistended, bowel sounds audible  Neurological: alert and responsive, weakness in all 4 extremities  Skin: No significant lesions appreciated, no significant rashes appreciated      CURRENT MEDICATIONS:  All medications reviewed and updated in Nursing Home EMR      Linda Murillo MD MPH  4/4/2022 2:55 PM

## 2022-04-04 NOTE — ASSESSMENT & PLAN NOTE
Continue chlorpromazine, clonazepam, duloxetine, olanzapine, trazodone, valproic acid  Increase mirtazapine to 15 mg every evening as per Psychiatry

## 2022-04-05 ENCOUNTER — NURSING HOME VISIT (OUTPATIENT)
Dept: GERIATRICS | Facility: OTHER | Age: 57
End: 2022-04-05
Payer: MEDICARE

## 2022-04-05 DIAGNOSIS — G32.81 CEREBELLAR ATAXIA IN DISEASES CLASSIFIED ELSEWHERE (HCC): Primary | Chronic | ICD-10-CM

## 2022-04-05 DIAGNOSIS — K21.9 GASTROESOPHAGEAL REFLUX DISEASE WITHOUT ESOPHAGITIS: Chronic | ICD-10-CM

## 2022-04-05 DIAGNOSIS — F31.12 BIPOLAR 1 DISORDER WITH MODERATE MANIA (HCC): Chronic | ICD-10-CM

## 2022-04-05 DIAGNOSIS — K59.01 SLOW TRANSIT CONSTIPATION: Chronic | ICD-10-CM

## 2022-04-05 DIAGNOSIS — G47.59 OTHER PARASOMNIA: ICD-10-CM

## 2022-04-05 PROCEDURE — 99309 SBSQ NF CARE MODERATE MDM 30: CPT | Performed by: NURSE PRACTITIONER

## 2022-05-02 ENCOUNTER — NURSING HOME VISIT (OUTPATIENT)
Dept: GERIATRICS | Facility: OTHER | Age: 57
End: 2022-05-02
Payer: MEDICARE

## 2022-05-02 DIAGNOSIS — Z86.69 HISTORY OF ANOXIC BRAIN INJURY: Chronic | ICD-10-CM

## 2022-05-02 DIAGNOSIS — F31.12 BIPOLAR 1 DISORDER WITH MODERATE MANIA (HCC): Primary | Chronic | ICD-10-CM

## 2022-05-02 DIAGNOSIS — G32.81 CEREBELLAR ATAXIA IN DISEASES CLASSIFIED ELSEWHERE (HCC): Chronic | ICD-10-CM

## 2022-05-02 DIAGNOSIS — K59.01 SLOW TRANSIT CONSTIPATION: Chronic | ICD-10-CM

## 2022-05-02 PROCEDURE — 99309 SBSQ NF CARE MODERATE MDM 30: CPT | Performed by: INTERNAL MEDICINE

## 2022-05-03 NOTE — ASSESSMENT & PLAN NOTE
Continue chlorpromazine, duloxetine, mirtazapine, olanzapine, trazodone, valproic acid  Continue slow complain nasal BM taper, currently at 0 5 mg in the morning and 1 mg at bedtime  May decrease bedtime dose to 0 5 mg in the next few weeks

## 2022-05-03 NOTE — PROGRESS NOTES
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 [facility])  Assessment & Plan:  Stable  Continue supportive care      3  Slow transit constipation  Assessment & Plan:  Stable  Continue Senokot      4  History of anoxic brain injury  Assessment & Plan:  Stable  CHIEF COMPLAINT:  Monthly Visit    HISTORY OF PRESENT ILLNESS:  History taken from patient and nursing staff    Bipolar disorder-continues with clonazepam at lower dose along with duloxetine, mirtazapine, olanzapine, trazodone, valproic acid  He reports that he has been sleeping somewhat better  Has started seeing [facility] here [facility]  GERD-continues with pantoprazole daily     Quadriplegia-continues with supportive care  Constipation-continues with Senokot therapy daily  REVIEW OF SYSTEMS:  Complete ROS negative other than as stated in HPI    HISTORY:  Medical Hx: Reviewed, unchanged  Family Hx: Reviewed, unchanged  Soc Hx: Reviewed, unchanged  No Known Allergies    PHYSICAL EXAM:  Vital Signs:  /82, temp 97 1°, HR 68, RR 16, O2 98% on room air  General: NAD, sitting wheelchair  Eye Exam: anicteric sclera, no discharge  ENT: moist mucous membranes  Cardiovascular: regular rate, regular rhythm, no murmurs, rubs, or gallops  Pulmonary: no wheeze, no rhonchi  Abdominal: soft, nontender, nondistended, bowel sounds audible  Neurological:  Awake alert, intact insight    Poor coordination of all 4 extremities  Skin: No significant lesions appreciated, no significant rashes appreciated    CURRENT MEDICATIONS:  All medications reviewed and updated in Nursing Home EMR      Rainer Hayes MD MPH

## 2022-05-16 ENCOUNTER — NURSING HOME VISIT (OUTPATIENT)
Dept: GERIATRICS | Facility: OTHER | Age: 57
End: 2022-05-16
Payer: MEDICARE

## 2022-05-16 DIAGNOSIS — F31.12 BIPOLAR 1 DISORDER WITH MODERATE MANIA (HCC): Primary | Chronic | ICD-10-CM

## 2022-05-16 PROCEDURE — 99308 SBSQ NF CARE LOW MDM 20: CPT | Performed by: INTERNAL MEDICINE

## 2022-05-16 NOTE — ASSESSMENT & PLAN NOTE
Stable  Continue chlorpromazine, duloxetine, mirtazapine, olanzapine, trazodone, more prone casted    Decrease clonazepam to 1/2 tablet twice a day starting Wednesday

## 2022-05-16 NOTE — PROGRESS NOTES
FOLLOW UP VISIT  Location: [facility] [place]  POS: 32 ([facility])    NAME: Austine Koyanagi  AGE: 61 y o  SEX: male    DATE OF ENCOUNTER: 5/16/2022    ASSESSMENT AND PROBLEMS:  1  Bipolar 1 disorder with moderate tavo (HCC)  Assessment & Plan:  Stable  Continue chlorpromazine, duloxetine, mirtazapine, olanzapine, trazodone, more prone casted  Decrease clonazepam to 1/2 tablet twice a day starting Wednesday        CHIEF COMPLAINT:  Bipolar disorder    HISTORY OF PRESENT ILLNESS:  History taken from patient    Bipolar disorder-patient reports that he continues to do well with less clonazepam minutes interested in further decreasing dosages  Nurses report stable status    REVIEW OF SYSTEMS:  Complete ROS negative other than as stated in HPI    HISTORY:  Medical Hx: Reviewed, unchanged  Family Hx: Reviewed, unchanged  Soc Hx: Reviewed, unchanged  No Known Allergies    PHYSICAL EXAM:  Vital Signs:  /82, temp 97 1°, HR 68  General: NAD, sitting wheelchair  Eye Exam: anicteric sclera, no discharge  ENT: moist mucous membranes  Cardiovascular: regular rate, regular rhythm, no murmurs, rubs, or gallops  Pulmonary: no wheeze, no rhonchi  Abdominal: soft, nontender, nondistended, bowel sounds audible  Neurological:  Awake alert, poor coordination of all 4 extremities  Skin: No significant lesions appreciated, no significant rashes appreciated    CURRENT MEDICATIONS:  All medications reviewed and updated in Nursing Home EMR      Melba Gomez MD MPH

## 2022-06-02 ENCOUNTER — NURSING HOME VISIT (OUTPATIENT)
Dept: GERIATRICS | Facility: OTHER | Age: 57
End: 2022-06-02
Payer: MEDICARE

## 2022-06-02 DIAGNOSIS — K21.9 GASTROESOPHAGEAL REFLUX DISEASE WITHOUT ESOPHAGITIS: Primary | Chronic | ICD-10-CM

## 2022-06-02 PROCEDURE — 99308 SBSQ NF CARE LOW MDM 20: CPT | Performed by: INTERNAL MEDICINE

## 2022-06-02 RX ORDER — PANTOPRAZOLE SODIUM 20 MG/1
20 TABLET, DELAYED RELEASE ORAL DAILY
COMMUNITY
End: 2022-07-28

## 2022-06-02 NOTE — PROGRESS NOTES
FOLLOW UP VISIT  Location: [facility] [place]  POS: 32 ([facility])    NAME: Kamlesh Evans  AGE: 61 y o  SEX: male    DATE OF ENCOUNTER: 6/2/2022    ASSESSMENT AND PROBLEMS:  1  Gastroesophageal reflux disease without esophagitis  Assessment & Plan:  Taper off pantoprazole  Decrease to 20mg now for 8 weeks, then stop  CHIEF COMPLAINT:  GERD    HISTORY OF PRESENT ILLNESS:  History taken from patient    GERD - requests stopping pantoprazole  Denies any GERD symptoms as of late  REVIEW OF SYSTEMS:  Complete ROS negative other than as stated in HPI    HISTORY:  Medical Hx: Reviewed, unchanged  Family Hx: Reviewed, unchanged  Soc Hx: Reviewed, unchanged  No Known Allergies    PHYSICAL EXAM:  General: NAD, sitting in wheelchair  Eye Exam: anicteric sclera, no discharge  ENT: moist mucous membranes  Cardiovascular: regular rate, regular rhythm, no murmurs, rubs, or gallops  Pulmonary: no wheeze, no rhonchi  Abdominal: soft, nontender, nondistended, bowel sounds audible  Neurological: awake, alert, poor coordination of all 4 extremities  Skin: No significant lesions appreciated, no significant rashes appreciated    CURRENT MEDICATIONS:  All medications reviewed and updated in Nursing Home EMR      Yissel Staley MD MPH

## 2022-07-18 ENCOUNTER — NURSING HOME VISIT (OUTPATIENT)
Dept: GERIATRICS | Facility: OTHER | Age: 57
End: 2022-07-18
Payer: MEDICARE

## 2022-07-18 DIAGNOSIS — F31.12 BIPOLAR 1 DISORDER WITH MODERATE MANIA (HCC): Primary | Chronic | ICD-10-CM

## 2022-07-18 DIAGNOSIS — L98.9 ARM SKIN LESION, RIGHT: ICD-10-CM

## 2022-07-18 PROCEDURE — 99308 SBSQ NF CARE LOW MDM 20: CPT | Performed by: INTERNAL MEDICINE

## 2022-07-18 NOTE — PROGRESS NOTES
FOLLOW UP VISIT  Location: [facility]  POS: 32 ([facility])    NAME: Sabino Hurtado  AGE: 61 y o  SEX: male    ASSESSMENT AND PROBLEMS:  1  Bipolar 1 disorder with moderate tavo (HCC)  Assessment & Plan:  Recently doing well  Will taper off of clonazepam completely by decreasing to 1/2 tablet twice a day for 4 weeks and then stopping  Otherwise will continue with chlorpromazine, duloxetine, mirtazapine, olanzapine, trazodone, valproic acid  2  Arm skin lesion, right  Assessment & Plan:  Concerning for possible cancerous lesion  Refer to dermatology for excisional biopsy        CHIEF COMPLAINT:  Arm lesion    HISTORY OF PRESENT ILLNESS:  History taken from patient    Arm lesion-patient reports continued chronic lesion to the right arm which he thinks is growing and concerned  Request to see Dermatology  Bipolar disorder-continues with chlorpromazine duloxetine, olanzapine, trazodone, valproic acid, clonazepam   Reports stable mood as well as movement disorder regarding his cerebellar ataxia  REVIEW OF SYSTEMS:  Complete ROS negative other than as stated in HPI    HISTORY:  Medical Hx: Reviewed, unchanged  Family Hx: Reviewed, unchanged  Soc Hx: Reviewed, unchanged  No Known Allergies    PHYSICAL EXAM:  Vital Signs:  /70, temp 97 6°, HR 89  General: NAD, sitting wheelchair  Eye Exam: anicteric sclera, no discharge  ENT: moist mucous membranes  Cardiovascular: regular rate, regular rhythm, no murmurs, rubs, or gallops  Pulmonary: no wheeze, no rhonchi  Abdominal: soft, nontender, nondistended, bowel sounds audible  Neurological:  Awake alert, somewhat poor coordination of all 4 extremities    Skin:  3 mm round raised verrucous lesion of right forearm present with very mild ring of surrounding erythema, no significant rashes appreciated    PERTINENT LABS/IMAGING DATA:  07/13/2022: COVID-19 PCR negative    CURRENT MEDICATIONS:  All medications reviewed and updated in [facility] Nela Leger MD MPH

## 2022-07-18 NOTE — ASSESSMENT & PLAN NOTE
Recently doing well  Will taper off of clonazepam completely by decreasing to 1/2 tablet twice a day for 4 weeks and then stopping  Otherwise will continue with chlorpromazine, duloxetine, mirtazapine, olanzapine, trazodone, valproic acid

## 2022-08-29 ENCOUNTER — NURSING HOME VISIT (OUTPATIENT)
Dept: GERIATRICS | Facility: OTHER | Age: 57
End: 2022-08-29
Payer: MEDICARE

## 2022-08-29 DIAGNOSIS — G32.81 CEREBELLAR ATAXIA IN DISEASES CLASSIFIED ELSEWHERE (HCC): Primary | Chronic | ICD-10-CM

## 2022-08-29 DIAGNOSIS — F31.12 BIPOLAR 1 DISORDER WITH MODERATE MANIA (HCC): Chronic | ICD-10-CM

## 2022-08-29 DIAGNOSIS — K21.9 GASTROESOPHAGEAL REFLUX DISEASE WITHOUT ESOPHAGITIS: Chronic | ICD-10-CM

## 2022-08-29 PROCEDURE — 99309 SBSQ NF CARE MODERATE MDM 30: CPT | Performed by: INTERNAL MEDICINE

## 2022-08-29 RX ORDER — MIRTAZAPINE 7.5 MG/1
7.5 TABLET, FILM COATED ORAL
COMMUNITY
End: 2022-10-11

## 2022-08-29 NOTE — PROGRESS NOTES
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)  Assessment & Plan:  Secondary to anoxic brain injury with previous cardiac arrest   Stable  Continue supportive care      2  Bipolar 1 disorder with moderate tavo (HCC)  Assessment & Plan:  Stable off of clonazepam   Will decrease mirtazapine to 7 5 mg for 6 weeks and then stop  Will continue with chlorpromazine, duloxetine, olanzapine, trazodone, valproic acid  3  Gastroesophageal reflux disease without esophagitis  Assessment & Plan:  Appears to be doing fine off of therapy  Resolved        CHIEF COMPLAINT:  Monthly Visit    HISTORY OF PRESENT ILLNESS:  History taken from patient    Mood disorder-continues with chlorpromazine, duloxetine, mirtazapine, olanzapine, trazodone, valproic acid  Recently has tapered completely off of clonazepam   He reports stable status as does nursing staff  GERD-continues off of PPI therapy and denies symptoms      Cerebellar ataxia-continues with supportive care    REVIEW OF SYSTEMS:  Complete ROS negative other than as stated in HPI    HISTORY:  Medical Hx: Reviewed, unchanged  Family Hx: Reviewed, unchanged  Soc Hx: Reviewed, unchanged  No Known Allergies    PHYSICAL EXAM:  Vital Signs:  /81, temp 98°, HR 95, RR 18, O2 97% on room air  General: NAD, sitting wheelchair  Eye Exam: anicteric sclera, no discharge  ENT: moist mucous membranes  Cardiovascular: regular rate, regular rhythm, no murmurs, rubs, or gallops  Pulmonary: no wheeze, no rhonchi  Abdominal: soft, nontender, nondistended, bowel sounds audible  Neurological:  Awake alert, poor coordination of all 4 extremities  Skin: No significant lesions appreciated, no significant rashes appreciated    PERTINENT LABS/IMAGING DATA:  08/22/2022: COVID-19 PCR negative    CURRENT MEDICATIONS:  All medications reviewed and updated in Nursing Home EMR      Edil Baldwin MD MPH

## 2022-08-29 NOTE — ASSESSMENT & PLAN NOTE
Stable off of clonazepam   Will decrease mirtazapine to 7 5 mg for 6 weeks and then stop  Will continue with chlorpromazine, duloxetine, olanzapine, trazodone, valproic acid

## 2022-09-07 ENCOUNTER — TELEPHONE (OUTPATIENT)
Dept: OTHER | Facility: OTHER | Age: 57
End: 2022-09-07

## 2022-09-08 ENCOUNTER — NURSING HOME VISIT (OUTPATIENT)
Dept: GERIATRICS | Facility: OTHER | Age: 57
End: 2022-09-08
Payer: MEDICARE

## 2022-09-08 DIAGNOSIS — U07.1 COVID-19: Primary | ICD-10-CM

## 2022-09-08 PROCEDURE — 99308 SBSQ NF CARE LOW MDM 20: CPT | Performed by: INTERNAL MEDICINE

## 2022-09-08 RX ORDER — NIRMATRELVIR AND RITONAVIR 300-100 MG
3 KIT ORAL 2 TIMES DAILY
Qty: 30 TABLET | Refills: 0 | Status: SHIPPED | OUTPATIENT
Start: 2022-09-08 | End: 2022-09-13

## 2022-09-08 NOTE — TELEPHONE ENCOUNTER
[phone]/[id number] from [address] regarding pt who is Covid Positive- Orders needed  Segundo Mon was paged via TC    Please allow the on-call provider 20-30 mins to respond  If you have not heard from them within that time, please feel free to call back

## 2022-09-08 NOTE — PROGRESS NOTES
FOLLOW UP VISIT  Location: [facility]  POS: 32 ([facility])    NAME: Oseas Eli  AGE: 61 y o  SEX: male    ASSESSMENT AND PROBLEMS:  1  COVID-19  Assessment & Plan:  Start Paxlovid therapy twice a day for 5 days        CHIEF COMPLAINT:  COVID-19    HISTORY OF PRESENT ILLNESS:  History taken from patient and nursing staff    COVID-19-nursing staff report patient has been complaining of sore throat for 2 days  They deny other significant symptomatology such as shortness of breath or fever  REVIEW OF SYSTEMS:  Complete ROS negative other than as stated in HPI    HISTORY:  Medical Hx: Reviewed, unchanged  Family Hx: Reviewed, unchanged  Soc Hx: Reviewed, unchanged  No Known Allergies    PHYSICAL EXAM:  Vital Signs:  /79, temp 98 2°, HR 80, R 16, O2 95% on room air  General: NAD in bed  Pulmonary: Breathing comfortably  Neurological:  Awake alert  Skin: No significant lesions appreciated, no significant rashes appreciated    PERTINENT LABS/IMAGING DATA:  09/07/2022: COVID-19 PCR positive   Flu/RSV PCR negative  09/08/2022:  Hemoglobin 13 1, WBC 5 4, platelet 784, glucose 79, BUN 11, creatinine 0 76, sodium 140, potassium 3 9    CURRENT MEDICATIONS:  All medications reviewed and updated in Nursing Home EMR      Provider: Antionette Bucio MD MPH  Patient: Oseas Eli

## 2022-10-07 ENCOUNTER — NURSING HOME VISIT (OUTPATIENT)
Dept: GERIATRICS | Facility: OTHER | Age: 57
End: 2022-10-07
Payer: MEDICARE

## 2022-10-07 VITALS
HEART RATE: 68 BPM | OXYGEN SATURATION: 100 % | DIASTOLIC BLOOD PRESSURE: 75 MMHG | SYSTOLIC BLOOD PRESSURE: 132 MMHG | RESPIRATION RATE: 16 BRPM | TEMPERATURE: 97.7 F

## 2022-10-07 DIAGNOSIS — F31.12 BIPOLAR 1 DISORDER WITH MODERATE MANIA (HCC): Chronic | ICD-10-CM

## 2022-10-07 DIAGNOSIS — G47.59 OTHER PARASOMNIA: Primary | ICD-10-CM

## 2022-10-07 PROCEDURE — 99308 SBSQ NF CARE LOW MDM 20: CPT

## 2022-10-07 RX ORDER — LANOLIN ALCOHOL/MO/W.PET/CERES
2 CREAM (GRAM) TOPICAL
COMMUNITY

## 2022-10-07 NOTE — PROGRESS NOTES
[facility]  [address]  [phone]  [facility]  [facility] [address] LTC  ACUTE VISIT    Assessment/Plan:    1  Other parasomnia  Assessment & Plan:  · Reports difficulty sleeping  · Increase melatonin from 3 mg at bedtime the 6 mg  · Continue mirtazapine and trazodone at bedtime  Provide redirection, reorientation, distraction techniques  Fall Precautions  Encourage Hydration/ Nutrition  Implement sleep hygiene, limit night time interuptions   Encourage participation in group activities       2  Bipolar 1 disorder with moderate tavo ([facility])  Assessment & Plan:  · Resting calmly in chair upon today's assessment  · No signs or symptoms of tavo  · Valproic acid, trazodone, olanzapine, duloxetine, chlorpromazine  · Difficulty sleeping at night, melatonin increased to 6 mg nightly           Subjective:      Patient ID: Jesika Guallpa is a 61 y o  male  Chief Complaint: Patient is a LTC resident at [facility]  Seen and evaluated at bedside today for Acute visit per request of nursing for c/o "difficutly sleeping"  PAST MEDICAL HISTORY:  Past Medical History:   Diagnosis Date    Ataxia     Bipolar 1 disorder ([facility] [address] )     Depression     Diplopia     Dysarthria     Hypertension     Insomnia     Onychomycosis     Optic atrophy     PVD (peripheral vascular disease) ([facility] [address] )     Quadriplegia ([facility] [address] )      No past surgical history on file  Morro Tran is 58-year-old male LTC resident of [facility] with history of quadriplegia, depression, bipolar, hypertension, and insomnia who was seen today for complaints of insomnia  He reports he has difficulty sleeping at night, he has known history of parasomnia, reports of nightmares  He denies taking any naps during the day  No reports of depression or anxiety  He was calm, cooperative, and in no acute distress on examination  Discussed encouraging activities during the day, avoid daytime naps    He is agreeable to incease melatonin to 6 mg nightly  Review of Systems   Constitutional: Negative for activity change, appetite change and fatigue  Psychiatric/Behavioral: Positive for sleep disturbance  Negative for dysphoric mood  The patient is not nervous/anxious and is not hyperactive  Objective:    VITALS:   Vitals:    10/07/22 1522   BP: 132/75   Pulse: 68   Resp: 16   Temp: 97 7 °F (36 5 °C)   SpO2: 100%          Physical Exam  Constitutional:       General: He is not in acute distress  Appearance: Normal appearance  He is ill-appearing  HENT:      Head: Normocephalic and atraumatic  Eyes:      General:         Right eye: No discharge  Left eye: No discharge  Conjunctiva/sclera: Conjunctivae normal    Cardiovascular:      Rate and Rhythm: Normal rate and regular rhythm  Heart sounds: Normal heart sounds  Pulmonary:      Effort: Pulmonary effort is normal  No respiratory distress  Breath sounds: No wheezing or rales  Neurological:      Mental Status: He is alert  Mental status is at baseline  Motor: Weakness present  Psychiatric:         Mood and Affect: Mood normal          Behavior: Behavior normal  Behavior is not agitated or withdrawn  Behavior is cooperative  Current medications:  Current medications reviewed and updated in nursing home EMR  Please note:  Voice-recognition software may have been used in the preparation of this document  Occasional wrong word or "sound-alike" substitutions may have occurred due to the inherent limitations of voice recognition software  Interpretation should be guided by context         Orvis Ivan MEDINA  10/07/22  3:24 PM

## 2022-10-07 NOTE — ASSESSMENT & PLAN NOTE
· Reports difficulty sleeping  · Increase melatonin from 3 mg at bedtime the 6 mg  · Continue mirtazapine and trazodone at bedtime  Provide redirection, reorientation, distraction techniques  Fall Precautions  Encourage Hydration/ Nutrition  Implement sleep hygiene, limit night time interuptions   Encourage participation in group activities

## 2022-10-11 ENCOUNTER — NURSING HOME VISIT (OUTPATIENT)
Dept: GERIATRICS | Facility: OTHER | Age: 57
End: 2022-10-11
Payer: MEDICARE

## 2022-10-11 DIAGNOSIS — F31.12 BIPOLAR 1 DISORDER WITH MODERATE MANIA (HCC): Primary | Chronic | ICD-10-CM

## 2022-10-11 PROCEDURE — 99308 SBSQ NF CARE LOW MDM 20: CPT | Performed by: INTERNAL MEDICINE

## 2022-10-11 NOTE — PROGRESS NOTES
FOLLOW UP VISIT  Location: [facility]  POS: 32 ([facility])    NAME: Oseas Eli  AGE: 61 y o  SEX: male    ASSESSMENT AND PROBLEMS:  1  Bipolar 1 disorder with moderate tavo ([facility])  Assessment & Plan:  Considering new mild manic symptoms will decrease duloxetine from 60 milligrams twice a day to once a day  Will continue with titration off of mirtazapine today  Otherwise will continue with chronic medications chlorpromazine, olanzapine, trazodone, valproic acid        CHIEF COMPLAINT:  Psychiatric concerns    HISTORY OF PRESENT ILLNESS:  History taken from patient and nursing staff    Concern for tavo-nursing staff alerted me to concern of manic symptoms including patient seems a bit overly enthusiastic about his mood and abilities  Today patient says that he is very excited for Sylvester time coming up, asks about my children and talks about his told and grandchildren and reports again that he is doing extremely well  He continues with multiple psychiatric medications including valproic acid, trazodone, olanzapine, duloxetine, chlorpromazine, mirtazapine  Recently patient has titrated off of clonazepam and now is on lower dose of mirtazapine scheduled to finish today      REVIEW OF SYSTEMS:  Complete ROS negative other than as stated in HPI    HISTORY:  Medical Hx: Reviewed, unchanged  Family Hx: Reviewed, unchanged  Soc Hx: Reviewed, unchanged  No Known Allergies    PHYSICAL EXAM:  Vital Signs:  /75, temp 97 7, HR 68, R 16, O2 100 percent on room air  General: NAD sitting in wheelchair  Eye Exam: anicteric sclera, no discharge  ENT: moist mucous membranes  Cardiovascular: regular rate, regular rhythm, no murmurs, rubs, or gallops  Pulmonary: no wheeze, no rhonchi  Abdominal: soft, nontender, nondistended, bowel sounds audible  Neurological:  Awake alert, poor coordination of all 4 extremities  Psychiatric:  Patient with mild manic type affect without delusions  Skin: No significant lesions appreciated, no significant rashes appreciated    PERTINENT LABS/IMAGING DATA:  09/08/2022:  Hemoglobin 13 1, WBC 5 4, platelet 588, creatinine 0 76, sodium 140    CURRENT MEDICATIONS:  All medications reviewed and updated in Nursing Home EMR      Provider: Alicia Ko MD MPH  Patient: Radha Johnson

## 2022-10-11 NOTE — ASSESSMENT & PLAN NOTE
Considering new mild manic symptoms will decrease duloxetine from 60 milligrams twice a day to once a day  Will continue with titration off of mirtazapine today    Otherwise will continue with chronic medications chlorpromazine, olanzapine, trazodone, valproic acid

## 2022-10-12 PROBLEM — J01.90 ACUTE NON-RECURRENT SINUSITIS: Status: RESOLVED | Noted: 2021-12-06 | Resolved: 2022-10-12

## 2022-10-24 ENCOUNTER — NURSING HOME VISIT (OUTPATIENT)
Dept: GERIATRICS | Facility: OTHER | Age: 57
End: 2022-10-24
Payer: MEDICARE

## 2022-10-24 DIAGNOSIS — G82.50 QUADRIPLEGIA (HCC): Primary | Chronic | ICD-10-CM

## 2022-10-24 DIAGNOSIS — K59.01 SLOW TRANSIT CONSTIPATION: Chronic | ICD-10-CM

## 2022-10-24 DIAGNOSIS — F31.12 BIPOLAR 1 DISORDER WITH MODERATE MANIA (HCC): Chronic | ICD-10-CM

## 2022-10-24 PROCEDURE — 99309 SBSQ NF CARE MODERATE MDM 30: CPT | Performed by: INTERNAL MEDICINE

## 2022-10-24 NOTE — PROGRESS NOTES
MONTHLY VISIT  Location: [facility]  POS: [facility]    NAME: Milo Plaza  AGE: 61 y o  SEX: male    DATE OF ENCOUNTER: 10/24/2022    ASSESSMENT AND PROBLEMS:  1  Quadriplegia ([address] )  Assessment & Plan:  Stable  Continue supportive care      2  Bipolar 1 disorder with moderate tavo (HCC)  Assessment & Plan:  Appears improved with lower dose of duloxetine  Continue current chlorpromazine, duloxetine, olanzapine, trazodone, valproic acid  Will likely benefit from further reduction of duloxetine over time  Continue to monitor  3  Slow transit constipation  Assessment & Plan:  Stable  Continue Senokot therapy        CHIEF COMPLAINT:  Monthly Visit    HISTORY OF PRESENT ILLNESS:  History taken from patient and nursing staff    Bipolar 1 disorder-continues with duloxetine now at lower doses, olanzapine, trazodone, valproic acid, chlorpromazine  Patient reports doing well  Nursing staff reports stable mood with apparently less concern for previous manic symptoms  Quadriplegia-patient reports stable status as does nursing staff        Constipation-continues with senna therapies    REVIEW OF SYSTEMS:  Complete ROS negative other than as stated in HPI    HISTORY:  Medical Hx: Reviewed, unchanged  Family Hx: Reviewed, unchanged  Soc Hx: Reviewed, unchanged  No Known Allergies    PHYSICAL EXAM:  Vital Signs:  /75, temp 97 7°, HR 68, RR 16, O2 100% on room air  General: NAD, sitting wheelchair  Eye Exam: anicteric sclera, no discharge  ENT: moist mucous membranes  Cardiovascular: regular rate, regular rhythm, no murmurs, rubs, or gallops  Pulmonary: no wheeze, no rhonchi  Abdominal: soft, nontender, nondistended, bowel sounds audible  Neurological:  Poor coordination of all 4 extremities, awake alert  Skin: No significant lesions appreciated, no significant rashes appreciated    PERTINENT LABS/IMAGING DATA:  09/08/2022:  Hemoglobin 13 1, WBC 5 4, platelet 606, BUN 11, creatinine 0 76, sodium 140, potassium 3 9    CURRENT MEDICATIONS:  All medications reviewed and updated in Nursing Home EMR      Provider: Shonna Cunningham MD MPH  Patient: Robbi Elliott

## 2022-10-24 NOTE — ASSESSMENT & PLAN NOTE
Appears improved with lower dose of duloxetine  Continue current chlorpromazine, duloxetine, olanzapine, trazodone, valproic acid  Will likely benefit from further reduction of duloxetine over time  Continue to monitor

## 2022-11-21 ENCOUNTER — NURSING HOME VISIT (OUTPATIENT)
Dept: GERIATRICS | Facility: OTHER | Age: 57
End: 2022-11-21

## 2022-11-21 DIAGNOSIS — F31.12 BIPOLAR 1 DISORDER WITH MODERATE MANIA (HCC): Primary | Chronic | ICD-10-CM

## 2022-11-22 NOTE — ASSESSMENT & PLAN NOTE
Has been doing quite well regularly with his mood  At this point will taper slowly off duloxetine  Reduce to 30 milligrams daily for 4 weeks and then stop  Will continue with chlorpromazine, olanzapine, trazodone, valproic acid    May consider tapering off of trazodone next

## 2022-11-22 NOTE — PROGRESS NOTES
FOLLOW UP VISIT  Location: [facility]  POS: 32 ([facility])    NAME: Raya Robison  AGE: 61 y o  SEX: male    ASSESSMENT AND PROBLEMS:  1  Bipolar 1 disorder with moderate tavo ([facility] [address] )  Assessment & Plan:  Has been doing quite well regularly with his mood  At this point will taper slowly off duloxetine  Reduce to 30 milligrams daily for 4 weeks and then stop  Will continue with chlorpromazine, olanzapine, trazodone, valproic acid  May consider tapering off of trazodone next        CHIEF COMPLAINT:  Bipolar disorder    HISTORY OF PRESENT ILLNESS:  History taken from patient    Bipolar disorder-patient reports doing well    He continues with duloxetine, olanzapine, trazodone, valproic acid, chlorpromazine    REVIEW OF SYSTEMS:  Complete ROS negative other than as stated in HPI    HISTORY:  Medical Hx: Reviewed, unchanged  Family Hx: Reviewed, unchanged  Soc Hx: Reviewed, unchanged  No Known Allergies    PHYSICAL EXAM:  General: NAD, sitting wheelchair  Neurological:  Awake, alert, poor coordination of all 4 extremities  Psychiatric:  Mood euthymic, thought processes appear normal    Provider: Whitney Lundberg MD MPH  Patient: Raya Robison

## 2022-11-25 ENCOUNTER — NURSING HOME VISIT (OUTPATIENT)
Dept: GERIATRICS | Facility: OTHER | Age: 57
End: 2022-11-25

## 2022-11-25 DIAGNOSIS — G47.59 OTHER PARASOMNIA: ICD-10-CM

## 2022-11-25 DIAGNOSIS — K59.01 SLOW TRANSIT CONSTIPATION: Chronic | ICD-10-CM

## 2022-11-25 DIAGNOSIS — G82.50 QUADRIPLEGIA (HCC): Primary | Chronic | ICD-10-CM

## 2022-11-25 DIAGNOSIS — F31.12 BIPOLAR 1 DISORDER WITH MODERATE MANIA (HCC): Chronic | ICD-10-CM

## 2022-11-25 NOTE — ASSESSMENT & PLAN NOTE
Reports he is sleeping well  Continue 6 mg Melatonin at HS  Promote sleep hygiene and day time activities, avoid day time naps

## 2022-11-25 NOTE — PROGRESS NOTES
MONTHLY VISIT  [facility]: [facility]  POS: LTC    NAME: Kamlesh Evans  AGE: 61 y o  SEX: male    DATE OF ENCOUNTER: 11/25/2022    ASSESSMENT AND PROBLEMS:  1  Quadriplegia ([address] )  Assessment & Plan:  Stable  Continue supportive care at LT including PT and TR  Mobility with wheelchair use  Continue skin interventions      2  Bipolar 1 disorder with moderate tavo (HCC)  Assessment & Plan:  Stable  Duloxetine being tappered slowly  - Reduce to 30 milligrams daily for 4 weeks and then stop  Continue valproic acid, chlorpromazine, olanzapine, trazodone  Consider trazodone taper next      3  Slow transit constipation  Assessment & Plan:  Stable  Continue Senna      4  Other parasomnia  Assessment & Plan:  Reports he is sleeping well  Continue 6 mg Melatonin at HS  Promote sleep hygiene and day time activities, avoid day time naps        CHIEF COMPLAINT:  Monthly Visit    HISTORY OF PRESENT ILLNESS:  History taken from patient, staff, chart review  Jonna Adan is a 62 y/o male with hx including but not limited to quadriplegia, depression, bipolar, hypertension, and insomnia seen today for monthly visit  Patient examined at bedside, sitting in wheelchair, NAD, alert, engaged in conversation  He continues at LT for ongoing supportive care in setting of quadriplegia and assistance with ADLs  Continues PT, seen using wheelchair from hallway to room, doing well  Reports he is doing "very good"  Reports overall sleep habits are good, continues on melatonin, reports it is "helping a lot"  Reports good mood, denies anxiety, depression, reprots "im not sad, im very happy"  Reports daily bowel movements, he is eating and drinking without issue  Denies fever, fatigue, chills, cough, SOB, nausea, abdominal pain, dysuria  Denies having any questions  No concerns from nursing staff       REVIEW OF SYSTEMS:  Complete ROS negative other than as stated in HPI    HISTORY:  Medical Hx: Reviewed, unchanged  Family Hx: Reviewed, unchanged  Soc Hx: Reviewed, unchanged  No Known Allergies    PHYSICAL EXAM:  Vital Signs:  /74, temp 97 9°, HR 86, RR 16, O2 95%, weight 192 lb  General: NAD, sitting in wheelchair  Eye Exam: anicteric sclera, no discharge, EOMI intact  ENT: moist mucous membranes  Cardiovascular: regular rate, regular rhythm, no murmurs, rubs, or gallops  Pulmonary: no wheeze, no rhonchi, CTA, unlabored  Abdominal: soft, nontender, nondistended, bowel sounds audible  Neurological: slurred speech  Awake, alert, engaged in conversation, upright position  Generalized weakness  Skin: No significant lesions appreciated, no significant rashes appreciated  Psych: pleasant, cooperative, good insight  PERTINENT LABS/IMAGING DATA:    9/8 CBC, BMP:  Hemoglobin 30 1, hematocrit 38 0, WBC 5 4, BUN 11, creatinine 0 76, sodium 140, potassium 3 9, EGFR 104    CURRENT MEDICATIONS:  All medications reviewed and updated in Nursing Home EMR      Provider: Spring MEDINA  Patient: Jami Prieto   11/25/22 1:06 PM

## 2022-11-25 NOTE — ASSESSMENT & PLAN NOTE
Stable  Continue supportive care at LTC including PT and TR  Mobility with wheelchair use  Continue skin interventions

## 2022-11-25 NOTE — ASSESSMENT & PLAN NOTE
Stable  Duloxetine being tappered slowly  - Reduce to 30 milligrams daily for 4 weeks and then stop      Continue valproic acid, chlorpromazine, olanzapine, trazodone  Consider trazodone taper next

## 2022-12-12 ENCOUNTER — NURSING HOME VISIT (OUTPATIENT)
Dept: GERIATRICS | Facility: OTHER | Age: 57
End: 2022-12-12

## 2022-12-12 DIAGNOSIS — K59.01 SLOW TRANSIT CONSTIPATION: Chronic | ICD-10-CM

## 2022-12-12 DIAGNOSIS — F31.12 BIPOLAR 1 DISORDER WITH MODERATE MANIA (HCC): Chronic | ICD-10-CM

## 2022-12-12 DIAGNOSIS — G82.50 QUADRIPLEGIA (HCC): Primary | Chronic | ICD-10-CM

## 2022-12-12 DIAGNOSIS — G32.81 CEREBELLAR ATAXIA IN DISEASES CLASSIFIED ELSEWHERE (HCC): Chronic | ICD-10-CM

## 2022-12-12 PROBLEM — M79.642 LEFT HAND PAIN: Status: RESOLVED | Noted: 2022-03-14 | Resolved: 2022-12-12

## 2022-12-12 PROBLEM — B37.9 CANDIDA ALBICANS INFECTION: Status: RESOLVED | Noted: 2021-06-01 | Resolved: 2022-12-12

## 2022-12-12 PROBLEM — L98.9 ARM SKIN LESION, RIGHT: Status: RESOLVED | Noted: 2022-07-18 | Resolved: 2022-12-12

## 2022-12-12 RX ORDER — DULOXETIN HYDROCHLORIDE 30 MG/1
30 CAPSULE, DELAYED RELEASE ORAL DAILY
COMMUNITY
End: 2022-12-20

## 2022-12-13 NOTE — PROGRESS NOTES
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 [facility])  Assessment & Plan:  Stable  Continue supportive care      4  Slow transit constipation  Assessment & Plan:  Recently with more loose stool  May decrease Senokot to 1 tablet at bedtime        CHIEF COMPLAINT:  Monthly Visit    HISTORY OF PRESENT ILLNESS:  History taken from patient    Quadriplegia-patient reports stable status    Bipolar disorder-continues to taper off of medications  Currently recently decreased duloxetine to 30 mg daily  Reports doing well with this  Also continues with olanzapine, trazodone, valproic acid, chlorpromazine and he does report having a couple weeks of loose stool but that it seems to be resolving on its own  Constipation-currently ordered Senokot 2 tablets every evening      REVIEW OF SYSTEMS:  Complete ROS negative other than as stated in HPI    HISTORY:  Medical Hx: Reviewed, unchanged  Family Hx: Reviewed, unchanged  Soc Hx: Reviewed, unchanged  No Known Allergies    PHYSICAL EXAM:  Vital Signs: /69, temp 97 9, HR 84, RR 16, O2 96% on room air  General: NAD, sitting wheelchair  Eye Exam: anicteric sclera, no discharge  ENT: moist mucous membranes  Cardiovascular: regular rate, regular rhythm, no murmurs, rubs, or gallops  Pulmonary: no wheeze, no rhonchi  Abdominal: soft, nontender, nondistended, bowel sounds audible  Neurological: Awake alert, poor coordination of all 4 extremities  Skin: No significant lesions appreciated, no significant rashes appreciated    PERTINENT LABS/IMAGING DATA:  9/8/2022: Hemoglobin 13 1, WBC 5 4, platelet 556, glucose 79, BUN 11, creatinine 0 76, sodium 140, potassium 3 9    CURRENT MEDICATIONS:  All medications reviewed and updated in Nursing Home EMR      Provider: Tanner Jesus MD MPH  Patient: Irma July

## 2022-12-13 NOTE — ASSESSMENT & PLAN NOTE
Doing well  Continue taper off of duloxetine  Continue with duloxetine 30 mg until December 20    As well continue chlorpromazine, olanzapine, trazodone, valproic acid

## 2023-01-06 ENCOUNTER — NURSING HOME VISIT (OUTPATIENT)
Dept: GERIATRICS | Facility: OTHER | Age: 58
End: 2023-01-06

## 2023-01-06 DIAGNOSIS — K59.01 SLOW TRANSIT CONSTIPATION: Chronic | ICD-10-CM

## 2023-01-06 DIAGNOSIS — F31.12 BIPOLAR 1 DISORDER WITH MODERATE MANIA (HCC): Chronic | ICD-10-CM

## 2023-01-06 DIAGNOSIS — G82.50 QUADRIPLEGIA (HCC): Primary | Chronic | ICD-10-CM

## 2023-01-06 DIAGNOSIS — G47.59 OTHER PARASOMNIA: ICD-10-CM

## 2023-01-06 NOTE — ASSESSMENT & PLAN NOTE
Patient reports difficulty initiating and maintaining sleep  Staff reports patient has been sleeping at [address] on Melatonin 6mg qHS  Continue Zyprexia, Trazodone, Valproic acid  Continue to monitor symptoms, hold off additional medications at this time  Monitor depression symptoms  Monitor sleeping habits, encourage sleep hygiene

## 2023-01-06 NOTE — PROGRESS NOTES
MONTHLY VISIT  Location: [facility]  POS: [facility]    NAME: Wendy Adams  AGE: 61 y o  SEX: male    DATE OF ENCOUNTER: 1/6/2023    ASSESSMENT AND PROBLEMS:  1  Quadriplegia ([facility] [address] )  Assessment & Plan:  Stable  Mobilization via wheelchair  Continue PT ongoing supportive care at [facility]      2  Bipolar 1 disorder with moderate tavo ([facility] [address] )  Assessment & Plan:  Was tapered off of duloxetine on December 20  Had followed by psychology and [facility] in the past  Reports of depressive like symptoms today - seem to be isolated and likely in setting of upcoming anniversary of his wife  He denies any SI/HI  Continue chlorpromazine, olanzapine, trazodone, valproic acid  Continue to monitor mood and behavior to determine if depressive symptoms ongoing  Patient agreeable to follow up with psychology            3  Slow transit constipation  Assessment & Plan:  Stable  Continue Senna q HS      4  Other parasomnia  Assessment & Plan:  Patient reports difficulty initiating and maintaining sleep  Staff reports patient has been sleeping at [address] on Melatonin 6mg qHS  Continue Zyprexia, Trazodone, Valproic acid  Continue to monitor symptoms, hold off additional medications at this time  Monitor depression symptoms  Monitor sleeping habits, encourage sleep hygiene        CHIEF COMPLAINT:  Monthly Visit    HISTORY OF PRESENT ILLNESS:  History taken from patient, staff, chart    Judy Mane is a 62 y/o male with hx including but not limited to quadriplegia, depression, bipolar, hypertension, and insomnia seen today for monthly visit and depressive-like symptoms  Continues with ongoing supportive care at [address] [facility] in setting of quadriplegia and cerebellar ataxia  Continues with wheelchair use and assistance with ADLs  Today staff reports patient is feeling depressed, sad, and has difficulty sleeping  He schaefer shave hx of bipolar 1 and parainsomnia in which he continues on Chlorpromazine, olanzapine, trazodone, and valproic acid  Staff reports he has been sleeping at night  He reports difficulty initiating and maintaining sleep  He denies any decreased appetite or SI  BM stable on Senna  He does report that his 27 year anniversary is coming up with he admits may be contributing to his current symptoms  Staff reports his mood has typically been stable, in good spirits  He has followed up with psychology in the past and would be agreeable to follow with them again  REVIEW OF SYSTEMS:  Complete ROS negative other than as stated in HPI    HISTORY:  Medical Hx: Reviewed, unchanged  Family Hx: Reviewed, unchanged  Soc Hx: Reviewed, unchanged  No Known Allergies    PHYSICAL EXAM:  Vital Signs: /62, temp 98 5, HR 87, RR 20, O2 94% on room air    General: NAD, sitting in wheelchair  Eye Exam: anicteric sclera, no discharge, EOMI intact  ENT: moist mucous membranes  Cardiovascular: regular rate, regular rhythm, no murmurs, rubs, or gallops  Pulmonary: no wheeze, no rhonchi, CTA, unlabored  Abdominal: soft, nontender, nondistended, bowel sounds audible  Neurological: slurred speech  Awake, alert, engaged in conversation, upright position  Generalized weakness  Skin: No significant lesions appreciated, no significant rashes appreciated  Psych: pleasant, cooperative, good insight  PERTINENT LABS/IMAGING DATA:  12/16 CBC, CMP: Hemoglobin 13 5, WBC 3 6, BUN 21, creatinine 0 8, sodium 138, potassium 3 8    CURRENT MEDICATIONS:  All medications reviewed and updated in Nursing Home EMR      Provider: Harriet MEDINA  Patient: Roxann Haines   1/6/23 12:00 PM

## 2023-01-06 NOTE — ASSESSMENT & PLAN NOTE
Stable  Continue ongoing supportive care at [address] [place] [address]  Wheelchair use and assistance with ADLs   PT

## 2023-01-06 NOTE — ASSESSMENT & PLAN NOTE
Was tapered off of duloxetine on December 20      Had followed by psychology and [facility] in the past  Reports of depressive like symptoms today - seem to be isolated and likely in setting of upcoming anniversary of his wife  He denies any SI/HI  Continue chlorpromazine, olanzapine, trazodone, valproic acid  Continue to monitor mood and behavior to determine if depressive symptoms ongoing  Patient agreeable to follow up with psychology

## 2023-01-09 ENCOUNTER — NURSING HOME VISIT (OUTPATIENT)
Dept: GERIATRICS | Facility: OTHER | Age: 58
End: 2023-01-09

## 2023-01-09 DIAGNOSIS — F31.12 BIPOLAR 1 DISORDER WITH MODERATE MANIA (HCC): Primary | Chronic | ICD-10-CM

## 2023-01-09 NOTE — ASSESSMENT & PLAN NOTE
Now with worsening depression symptoms  Patient to see psychotherapist this week  Without active suicidal ideation  Continue with current valproic acid, trazodone, olanzapine, chlorpromazine    If symptoms continue could consider restarting duloxetine therapy

## 2023-01-09 NOTE — PROGRESS NOTES
FOLLOW UP VISIT  Location: [facility]  POS: [address] ([facility])    NAME: Jasmyn Mills  AGE: 61 y o  SEX: male    ASSESSMENT AND PROBLEMS:  1  Bipolar 1 disorder with moderate tavo (HCC)  Assessment & Plan:  Now with worsening depression symptoms  Patient to see psychotherapist this week  Without active suicidal ideation  Continue with current valproic acid, trazodone, olanzapine, chlorpromazine  If symptoms continue could consider restarting duloxetine therapy      CHIEF COMPLAINT:  Mood problem    HISTORY OF PRESENT ILLNESS:  History taken from patient and nursing staff    Mood issue-I was alerted by nursing staff for concern of mood-patient reports that mental illness is caused by the devil  He reports having a lot of thoughts regarding his suicide attempt and subsequent injury from 30 years ago    He also reports increased difficulty with sleep although he reports this is likely secondary to his thoughts regarding injury from 30 years ago    REVIEW OF SYSTEMS:  Complete ROS negative other than as stated in HPI    HISTORY:  Medical Hx: Reviewed, unchanged  Family Hx: Reviewed, unchanged  Soc Hx: Reviewed, unchanged  No Known Allergies    PHYSICAL EXAM:  Vital Signs: /75, temp 97 6, HR 78, RR 16, O2 96% on room air  General: NAD, sitting wheelchair  Eye Exam: anicteric sclera, no discharge  ENT: moist mucous membranes  Cardiovascular: regular rate, regular rhythm, no murmurs, rubs, or gallops  Pulmonary: no wheeze, no rhonchi  Abdominal: soft, nontender, nondistended, bowel sounds audible  Neurological: Awake alert, poor coordination of all 4 extremities  Skin: No significant lesions appreciated, no significant rashes appreciated    Provider: Katelyn Speaker MD MPH  Patient: Jasmyn Mills

## 2023-02-02 ENCOUNTER — NURSING HOME VISIT (OUTPATIENT)
Dept: GERIATRICS | Facility: OTHER | Age: 58
End: 2023-02-02

## 2023-02-02 DIAGNOSIS — F31.32 BIPOLAR 1 DISORDER, DEPRESSED, MODERATE (HCC): Primary | ICD-10-CM

## 2023-02-02 RX ORDER — DULOXETIN HYDROCHLORIDE 60 MG/1
60 CAPSULE, DELAYED RELEASE ORAL DAILY
COMMUNITY

## 2023-02-03 NOTE — ASSESSMENT & PLAN NOTE
Continue chlorpromazine, olanzapine, trazodone, valproic acid  Crease duloxetine to 60 mg daily and monitor symptoms

## 2023-02-03 NOTE — PROGRESS NOTES
FOLLOW UP VISIT  Location: [facility]  POS: [facility] ([facility])    NAME: Micheline Shipley  AGE: 61 y o  SEX: male    ASSESSMENT AND PROBLEMS:  1  Bipolar 1 disorder, depressed, moderate ([facility])  Assessment & Plan:  Continue chlorpromazine, olanzapine, trazodone, valproic acid  Crease duloxetine to 60 mg daily and monitor symptoms  CHIEF COMPLAINT:  Anxiety and chest pain    HISTORY OF PRESENT ILLNESS:  History taken from patient    Patient reports having feeling of panic attack and increased anxiety  He reports not sleeping very well  He also reports some intermittent chest pain lasting for about an hour at a time  He reports that his mood is not doing well he thinks he needs adjustments in medications  Continues with olanzapine, trazodone, valproic acid, duloxetine      REVIEW OF SYSTEMS:  Complete ROS negative other than as stated in HPI    HISTORY:  Medical Hx: Reviewed, unchanged  Family Hx: Reviewed, unchanged  Soc Hx: Reviewed, unchanged  No Known Allergies    PHYSICAL EXAM:  Vital Signs: /71, temp 97 8, HR 87, RR 16  General: NAD, sitting in wheelchair  Eye Exam: anicteric sclera, no discharge  ENT: moist mucous membranes  Cardiovascular: regular rate, regular rhythm, no murmurs, rubs, or gallops  Pulmonary: no wheeze, no rhonchi  Abdominal: soft, nontender, nondistended, bowel sounds audible  Neurological: Awake alert, poor coordination of all 4 extremities  Skin: No significant lesions appreciated, no significant rashes appreciated      Provider: Bereket Bird MD MPH  Patient: Micheline Shipley

## 2023-02-06 ENCOUNTER — TELEPHONE (OUTPATIENT)
Dept: OTHER | Facility: OTHER | Age: 58
End: 2023-02-06

## 2023-02-06 ENCOUNTER — NURSING HOME VISIT (OUTPATIENT)
Dept: GERIATRICS | Facility: OTHER | Age: 58
End: 2023-02-06

## 2023-02-06 ENCOUNTER — APPOINTMENT (EMERGENCY)
Dept: RADIOLOGY | Facility: HOSPITAL | Age: 58
End: 2023-02-06

## 2023-02-06 ENCOUNTER — HOSPITAL ENCOUNTER (EMERGENCY)
Facility: HOSPITAL | Age: 58
Discharge: HOME | End: 2023-02-06
Attending: EMERGENCY MEDICINE

## 2023-02-06 VITALS
RESPIRATION RATE: 18 BRPM | TEMPERATURE: 98.1 F | HEART RATE: 77 BPM | DIASTOLIC BLOOD PRESSURE: 65 MMHG | SYSTOLIC BLOOD PRESSURE: 110 MMHG | OXYGEN SATURATION: 97 %

## 2023-02-06 DIAGNOSIS — F31.32 BIPOLAR 1 DISORDER, DEPRESSED, MODERATE (HCC): Primary | ICD-10-CM

## 2023-02-06 DIAGNOSIS — G82.50 QUADRIPLEGIA (HCC): Chronic | ICD-10-CM

## 2023-02-06 DIAGNOSIS — R07.9 CHEST PAIN: Primary | ICD-10-CM

## 2023-02-06 DIAGNOSIS — G32.81 CEREBELLAR ATAXIA IN DISEASES CLASSIFIED ELSEWHERE (HCC): Chronic | ICD-10-CM

## 2023-02-06 DIAGNOSIS — K59.01 SLOW TRANSIT CONSTIPATION: Chronic | ICD-10-CM

## 2023-02-06 PROBLEM — U07.1 COVID-19: Status: RESOLVED | Noted: 2020-12-15 | Resolved: 2023-02-06

## 2023-02-06 LAB
2HR DELTA HS TROPONIN: -2 NG/L
ALBUMIN SERPL BCP-MCNC: 4 G/DL (ref 3.5–5)
ALP SERPL-CCNC: 32 U/L (ref 34–104)
ALT SERPL W P-5'-P-CCNC: 16 U/L (ref 7–52)
ANION GAP SERPL CALCULATED.3IONS-SCNC: 6 MMOL/L (ref 4–13)
AST SERPL W P-5'-P-CCNC: 13 U/L (ref 13–39)
ATRIAL RATE: 66 BPM
ATRIAL RATE: 86 BPM
BASOPHILS # BLD AUTO: 0.03 THOUSANDS/ÂΜL (ref 0–0.1)
BASOPHILS NFR BLD AUTO: 1 % (ref 0–1)
BILIRUB SERPL-MCNC: 0.53 MG/DL (ref 0.2–1)
BUN SERPL-MCNC: 13 MG/DL (ref 5–25)
CALCIUM SERPL-MCNC: 8.9 MG/DL (ref 8.4–10.2)
CARDIAC TROPONIN I PNL SERPL HS: 4 NG/L
CARDIAC TROPONIN I PNL SERPL HS: 6 NG/L
CHLORIDE SERPL-SCNC: 99 MMOL/L (ref 96–108)
CO2 SERPL-SCNC: 30 MMOL/L (ref 21–32)
CREAT SERPL-MCNC: 0.8 MG/DL (ref 0.6–1.3)
EOSINOPHIL # BLD AUTO: 0.01 THOUSAND/ÂΜL (ref 0–0.61)
EOSINOPHIL NFR BLD AUTO: 0 % (ref 0–6)
ERYTHROCYTE [DISTWIDTH] IN BLOOD BY AUTOMATED COUNT: 11.6 % (ref 11.6–15.1)
GFR SERPL CREATININE-BSD FRML MDRD: 98 ML/MIN/1.73SQ M
GLUCOSE SERPL-MCNC: 91 MG/DL (ref 65–140)
HCT VFR BLD AUTO: 37.4 % (ref 36.5–49.3)
HGB BLD-MCNC: 13.2 G/DL (ref 12–17)
IMM GRANULOCYTES # BLD AUTO: 0.01 THOUSAND/UL (ref 0–0.2)
IMM GRANULOCYTES NFR BLD AUTO: 0 % (ref 0–2)
LYMPHOCYTES # BLD AUTO: 1.89 THOUSANDS/ÂΜL (ref 0.6–4.47)
LYMPHOCYTES NFR BLD AUTO: 38 % (ref 14–44)
MCH RBC QN AUTO: 29.5 PG (ref 26.8–34.3)
MCHC RBC AUTO-ENTMCNC: 35.3 G/DL (ref 31.4–37.4)
MCV RBC AUTO: 84 FL (ref 82–98)
MONOCYTES # BLD AUTO: 0.38 THOUSAND/ÂΜL (ref 0.17–1.22)
MONOCYTES NFR BLD AUTO: 8 % (ref 4–12)
NEUTROPHILS # BLD AUTO: 2.68 THOUSANDS/ÂΜL (ref 1.85–7.62)
NEUTS SEG NFR BLD AUTO: 53 % (ref 43–75)
NRBC BLD AUTO-RTO: 0 /100 WBCS
P AXIS: 48 DEGREES
P AXIS: 69 DEGREES
PLATELET # BLD AUTO: 182 THOUSANDS/UL (ref 149–390)
PMV BLD AUTO: 9.1 FL (ref 8.9–12.7)
POTASSIUM SERPL-SCNC: 3.6 MMOL/L (ref 3.5–5.3)
PR INTERVAL: 164 MS
PR INTERVAL: 178 MS
PROT SERPL-MCNC: 6.5 G/DL (ref 6.4–8.4)
QRS AXIS: 20 DEGREES
QRS AXIS: 47 DEGREES
QRSD INTERVAL: 94 MS
QRSD INTERVAL: 94 MS
QT INTERVAL: 350 MS
QT INTERVAL: 386 MS
QTC INTERVAL: 404 MS
QTC INTERVAL: 418 MS
RBC # BLD AUTO: 4.48 MILLION/UL (ref 3.88–5.62)
SODIUM SERPL-SCNC: 135 MMOL/L (ref 135–147)
T WAVE AXIS: 40 DEGREES
T WAVE AXIS: 47 DEGREES
VENTRICULAR RATE: 66 BPM
VENTRICULAR RATE: 86 BPM
WBC # BLD AUTO: 5 THOUSAND/UL (ref 4.31–10.16)

## 2023-02-06 NOTE — ED PROVIDER NOTES
History  Chief Complaint   Patient presents with   • Chest Pain     Pt arrived via EMS  Per EMS, pt has had cp since 1500 today  Pt was given 324 mg of Aspirin and 1 Nitro tablet PTA with some relief  Pt denies any other c/o at this time  HPI  Patient is a 62-year-old male with history of anoxic brain injury due to suicide attempt, heart failure, limb weakness and wheelchair-bound, depression presenting with chest pain  Patient states that he has been having intermittent chest pain for the past 2 weeks  Pain occurs randomly and patient states that its not pleuritic in nature  Patient does however feel short of breath whenever the chest pain occurs and he also notes palpitations that occur simultaneously  Denies any other associated symptoms including diaphoresis, nausea, vomiting  Patient denies any leg swelling or leg pain  No prior history of VTE  Per EMS patient was given 324 mg of aspirin and 1 sublingual nitro with relief of his pain  Patient states that currently his pain is very minimal   Denies any fever, chills, cough, abdominal pain, diarrhea  No new weakness or numbness  Prior to Admission Medications   Prescriptions Last Dose Informant Patient Reported? Taking?    Ciclopirox 1 % shampoo Unknown  Yes No   Sig: Apply topically 3 (three) times a week   DULoxetine (CYMBALTA) 60 mg delayed release capsule   Yes Yes   Sig: Take 60 mg by mouth daily   Multiple Vitamins-Minerals (Multilex-T&M) TABS   Yes Yes   Sig: Take 1 tablet by mouth daily   OLANZapine (ZyPREXA) 10 mg tablet   Yes Yes   Sig: Take 10 mg by mouth daily at bedtime   acetaminophen (TYLENOL) 325 mg tablet   Yes Yes   Sig: Take 650 mg by mouth every 4 (four) hours as needed for mild pain   albuterol (2 5 mg/3 mL) 0 083 % nebulizer solution   Yes Yes   Sig: Take 2 5 mg by nebulization every 6 (six) hours as needed   aluminum-magnesium hydroxide 200-200 MG/5ML suspension   Yes Yes   Sig: Take 10 mL by mouth 3 (three) times a day as needed   chlorproMAZINE (THORAZINE) 25 mg tablet   Yes Yes   Sig: Take 200 mg by mouth daily at bedtime   diclofenac sodium (VOLTAREN) 1 %   Yes Yes   Sig: Apply 2 g topically 3 (three) times a day as needed  Apply to Right hand pain   ibuprofen (MOTRIN) 100 mg/5 mL suspension   Yes Yes   Sig: Take 20 mL by mouth every 4 (four) hours as needed   melatonin 3 mg   Yes Yes   Sig: Take 2 tablets by mouth daily at bedtime   polyvinyl alcohol (LIQUIFILM TEARS) 1 4 % ophthalmic solution   Yes Yes   Sig: Administer 2 drops to both eyes 4 (four) times a day   senna (SENOKOT) 8 6 MG tablet   Yes Yes   Sig: Take 1 tablet by mouth daily at bedtime  At bedtime   traZODone (DESYREL) 100 mg tablet   Yes Yes   Sig: Take 100 mg by mouth daily at bedtime   valproic acid (DEPAKENE) 250 mg capsule   Yes Yes   Sig: Take 250 mg by mouth every morning   valproic acid (DEPAKENE) 250 mg capsule   Yes Yes   Sig: Take 500 mg by mouth daily at bedtime      Facility-Administered Medications: None       Past Medical History:   Diagnosis Date   • Ataxia    • Bipolar 1 disorder (HCC)    • Depression    • Diplopia    • Dysarthria    • Hypertension    • Insomnia    • Onychomycosis    • Optic atrophy    • PVD (peripheral vascular disease) ([facility] [address] )    • Quadriplegia ([facility] )        History reviewed  No pertinent surgical history  Family History   Problem Relation Age of Onset   • No Known Problems Mother    • No Known Problems Father      I have reviewed and agree with the history as documented  E-Cigarette/Vaping     E-Cigarette/Vaping Substances     Social History     Tobacco Use   • Smoking status: Never   • Smokeless tobacco: Never   Substance Use Topics   • Alcohol use: Not Currently   • Drug use: Never        Review of Systems   Constitutional: Negative for chills, diaphoresis, fever and unexpected weight change  HENT: Negative for ear pain and sore throat  Eyes: Negative for visual disturbance     Respiratory: Positive for shortness of breath  Negative for cough and chest tightness  Cardiovascular: Positive for chest pain and palpitations  Negative for leg swelling  Gastrointestinal: Negative for abdominal distention, abdominal pain, constipation, diarrhea, nausea and vomiting  Endocrine: Negative  Genitourinary: Negative for difficulty urinating and dysuria  Musculoskeletal: Negative  Skin: Negative  Allergic/Immunologic: Negative  Neurological: Negative  Hematological: Negative  Psychiatric/Behavioral: Negative  All other systems reviewed and are negative  Physical Exam  ED Triage Vitals   Temperature Pulse Respirations Blood Pressure SpO2   02/06/23 0346 02/06/23 0320 02/06/23 0320 02/06/23 0320 02/06/23 0320   98 1 °F (36 7 °C) 89 20 123/70 95 %      Temp Source Heart Rate Source Patient Position - Orthostatic VS BP Location FiO2 (%)   02/06/23 0346 02/06/23 0320 02/06/23 0320 02/06/23 0320 --   Oral Monitor Lying Left arm       Pain Score       --                    Orthostatic Vital Signs  Vitals:    02/06/23 0320 02/06/23 0430 02/06/23 0621   BP: 123/70 105/63 110/65   Pulse: 89 72 77   Patient Position - Orthostatic VS: Lying Lying Lying       Physical Exam  Vitals and nursing note reviewed  Constitutional:       General: He is not in acute distress  Appearance: Normal appearance  He is not ill-appearing  HENT:      Head: Normocephalic and atraumatic  Right Ear: External ear normal       Left Ear: External ear normal       Nose: Nose normal       Mouth/Throat:      Mouth: Mucous membranes are moist       Pharynx: Oropharynx is clear  Eyes:      General: No scleral icterus  Right eye: No discharge  Left eye: No discharge  Extraocular Movements: Extraocular movements intact  Conjunctiva/sclera: Conjunctivae normal       Pupils: Pupils are equal, round, and reactive to light  Cardiovascular:      Rate and Rhythm: Normal rate and regular rhythm        Pulses: Normal pulses  Heart sounds: Normal heart sounds  Pulmonary:      Effort: Pulmonary effort is normal       Breath sounds: Normal breath sounds  Abdominal:      General: Abdomen is flat  Bowel sounds are normal  There is no distension  Palpations: Abdomen is soft  Tenderness: There is no abdominal tenderness  There is no guarding or rebound  Musculoskeletal:         General: Normal range of motion  Cervical back: Normal range of motion and neck supple  Skin:     General: Skin is warm and dry  Capillary Refill: Capillary refill takes less than 2 seconds  Neurological:      General: No focal deficit present  Mental Status: He is alert and oriented to person, place, and time  Mental status is at baseline  Cranial Nerves: No cranial nerve deficit  Motor: Weakness (Weakness of all extremities at baselien) present  Psychiatric:         Mood and Affect: Mood normal          Behavior: Behavior normal          Thought Content:  Thought content normal          Judgment: Judgment normal          ED Medications  Medications - No data to display    Diagnostic Studies  Results Reviewed     Procedure Component Value Units Date/Time    HS Troponin I 2hr [[id number]]  (Normal) Collected: 02/06/23 0525    Lab Status: Final result Specimen: Blood from Arm, Right Updated: 02/06/23 0557     hs TnI 2hr 4 ng/L      Delta 2hr hsTnI -2 ng/L     HS Troponin 0hr (reflex protocol) [[id number]]  (Normal) Collected: 02/06/23 0324    Lab Status: Final result Specimen: Blood from Arm, Right Updated: 02/06/23 0357     hs TnI 0hr 6 ng/L     Comprehensive metabolic panel [[id number]]  (Abnormal) Collected: 02/06/23 0324    Lab Status: Final result Specimen: Blood from Arm, Right Updated: 02/06/23 0353     Sodium 135 mmol/L      Potassium 3 6 mmol/L      Chloride 99 mmol/L      CO2 30 mmol/L      ANION GAP 6 mmol/L      BUN 13 mg/dL      Creatinine 0 80 mg/dL      Glucose 91 mg/dL      Calcium 8 9 mg/dL      AST 13 U/L      ALT 16 U/L      Alkaline Phosphatase 32 U/L      Total Protein 6 5 g/dL      Albumin 4 0 g/dL      Total Bilirubin 0 53 mg/dL      eGFR 98 ml/min/1 73sq m     Narrative:      [place] guidelines for Chronic Kidney Disease (CKD):   •  Stage 1 with normal or high GFR (GFR > 90 mL/min/1 73 square meters)  •  Stage 2 Mild CKD (GFR = 60-89 mL/min/1 73 square meters)  •  Stage 3A Moderate CKD (GFR = 45-59 mL/min/1 73 square meters)  •  Stage 3B Moderate CKD (GFR = 30-44 mL/min/1 73 square meters)  •  Stage 4 Severe CKD (GFR = 15-29 mL/min/1 73 square meters)  •  Stage 5 End Stage CKD (GFR <15 mL/min/1 73 square meters)  Note: GFR calculation is accurate only with a steady state creatinine    CBC and differential [[id number]] Collected: 02/06/23 0324    Lab Status: Final result Specimen: Blood from Arm, Right Updated: 02/06/23 0329     WBC 5 00 Thousand/uL      RBC 4 48 Million/uL      Hemoglobin 13 2 g/dL      Hematocrit 37 4 %      MCV 84 fL      MCH 29 5 pg      MCHC 35 3 g/dL      RDW 11 6 %      MPV 9 1 fL      Platelets 164 Thousands/uL      nRBC 0 /100 WBCs      Neutrophils Relative 53 %      Immat GRANS % 0 %      Lymphocytes Relative 38 %      Monocytes Relative 8 %      Eosinophils Relative 0 %      Basophils Relative 1 %      Neutrophils Absolute 2 68 Thousands/µL      Immature Grans Absolute 0 01 Thousand/uL      Lymphocytes Absolute 1 89 Thousands/µL      Monocytes Absolute 0 38 Thousand/µL      Eosinophils Absolute 0 01 Thousand/µL      Basophils Absolute 0 03 Thousands/µL                  XR chest 1 view portable   ED Interpretation by Trinidad Bill MD (02/06 0441)   No acute cardiopulmonary disease as interpreted by myself  Final Result by Vamshi Forbes MD (05/37 1635)      No acute cardiopulmonary disease                    Workstation performed: [id number]               Procedures  Procedures      ED Course  ED Course as of 02/07/23 1728   Mon Feb 06, 2023 5832 Procedure Note: EKG  Date/Time: 02/06/23 3:26 AM   Interpreted by: Chuck Diop  Indications / Diagnosis: CP  ECG reviewed by me, the ED Provider: yes   The EKG demonstrates:  Rhythm: normal sinus  Intervals: normal intervals  Axis: normal axis  QRS/Blocks: normal QRS  ST Changes: No acute ST Changes, no STD/GIOVANI  HEART Risk Score    Flowsheet Row Most Recent Value   Heart Score Risk Calculator    History 0 Filed at: 02/07/2023 1728   ECG 0 Filed at: 02/07/2023 1728   Age 1 Filed at: 02/07/2023 1728   Risk Factors 1 Filed at: 02/07/2023 1728   Troponin 0 Filed at: 02/07/2023 1728   HEART Score 2 Filed at: 02/07/2023 1728                      SBIRT 22yo+    Flowsheet Row Most Recent Value   SBIRT (25 yo +)    In order to provide better care to our patients, we are screening all of our patients for alcohol and drug use  Would it be okay to ask you these screening questions? Yes Filed at: 02/06/2023 5918   Initial Alcohol Screen: US AUDIT-C     1  How often do you have a drink containing alcohol? 0 Filed at: 02/06/2023 0322   2  How many drinks containing alcohol do you have on a typical day you are drinking? 0 Filed at: 02/06/2023 0322   3a  Male UNDER 65: How often do you have five or more drinks on one occasion? 0 Filed at: 02/06/2023 0322   Audit-C Score 0 Filed at: 02/06/2023 0334   BLAKE: How many times in the past year have you    Used an illegal drug or used a prescription medication for non-medical reasons? Never Filed at: 02/06/2023 8329                Medical Decision Making   61year old male with chest pain   Intermittent chest pain for several weeks   Will rule out ACS vs PTX vs pneumonia   Cardiac workup obtained   Labs normal as well as EKG   Patient discharged with return precaution provided     Chest pain: acute illness or injury  Amount and/or Complexity of Data Reviewed  Radiology: independent interpretation performed              Disposition  Final diagnoses:   Chest pain Time reflects when diagnosis was documented in both MDM as applicable and the Disposition within this note     Time User Action Codes Description Comment    2/6/2023  6:10 AM Celeste Parisi Add [R07 9] Chest pain       ED Disposition     ED Disposition   Discharge    Condition   Stable    Date/Time   Mon Feb 6, 2023  6:10 AM    Comment   Arash Hernandez discharge to home/self care                 Follow-up Information     Follow up With Specialties Details Why Contact Info Additional Information    Saul Ruano MD Geriatric Medicine Schedule an appointment as soon as possible for a visit   [id number]        [facility] Emergency Department Emergency Medicine Go to   [facility] [id number]  [address] [facility] Emergency Department, [address] [place] [address], [facility] [address] [facility]   [address] [facility] [address] [facility] [address], [address]          Discharge Medication List as of 2/6/2023  6:11 AM      CONTINUE these medications which have NOT CHANGED    Details   acetaminophen (TYLENOL) 325 mg tablet Take 650 mg by mouth every 4 (four) hours as needed for mild pain, Historical Med      albuterol (2 5 mg/3 mL) 0 083 % nebulizer solution Take 2 5 mg by nebulization every 6 (six) hours as needed, Historical Med      aluminum-magnesium hydroxide 200-200 MG/5ML suspension Take 10 mL by mouth 3 (three) times a day as needed, Historical Med      chlorproMAZINE (THORAZINE) 25 mg tablet Take 200 mg by mouth daily at bedtime, Historical Med      diclofenac sodium (VOLTAREN) 1 % Apply 2 g topically 3 (three) times a day as needed  Apply to Right hand pain, Historical Med      DULoxetine (CYMBALTA) 60 mg delayed release capsule Take 60 mg by mouth daily, Historical Med      ibuprofen (MOTRIN) 100 mg/5 mL suspension Take 20 mL by mouth every 4 (four) hours as needed, Historical Med      melatonin 3 mg Take 2 tablets by mouth daily at bedtime, Historical Med      Multiple Vitamins-Minerals (Multilex-T&M) TABS Take 1 tablet by mouth daily, Historical Med      OLANZapine (ZyPREXA) 10 mg tablet Take 10 mg by mouth daily at bedtime, Historical Med      polyvinyl alcohol (LIQUIFILM TEARS) 1 4 % ophthalmic solution Administer 2 drops to both eyes 4 (four) times a day, Historical Med      senna (SENOKOT) 8 6 MG tablet Take 1 tablet by mouth daily at bedtime  At bedtime, Historical Med      traZODone (DESYREL) 100 mg tablet Take 100 mg by mouth daily at bedtime, Historical Med      !! valproic acid (DEPAKENE) 250 mg capsule Take 250 mg by mouth every morning, Historical Med      !! valproic acid (DEPAKENE) 250 mg capsule Take 500 mg by mouth daily at bedtime, Historical Med      Ciclopirox 1 % shampoo Apply topically 3 (three) times a week, Historical Med       !! - Potential duplicate medications found  Please discuss with provider  No discharge procedures on file  PDMP Review     None           ED Provider  Attending physically available and evaluated Amanda Gloria  I managed the patient along with the ED Attending      Electronically Signed by         Mike Nascimento MD  02/07/23 0368

## 2023-02-06 NOTE — ED ATTENDING ATTESTATION
2/6/2023  IAllen MD, saw and evaluated the patient  I have discussed the patient with the resident/non-physician practitioner and agree with the resident's/non-physician practitioner's findings, Plan of Care, and MDM as documented in the resident's/non-physician practitioner's note, except where noted  All available labs and Radiology studies were reviewed  I was present for key portions of any procedure(s) performed by the resident/non-physician practitioner and I was immediately available to provide assistance  At this point I agree with the current assessment done in the Emergency Department  I have conducted an independent evaluation of this patient a history and physical is as follows:      Final Diagnosis:  1  Chest pain      Chief Complaint   Patient presents with   • Chest Pain     Pt arrived via EMS  Per EMS, pt has had cp since 1500 today  Pt was given 324 mg of Aspirin and 1 Nitro tablet PTA with some relief  Pt denies any other c/o at this time  This is a 54-year-old male with a history of anoxic brain injury, quadriplegia, bipolar disorder who presents with chest pain  Pain has been intermittent since yesterday  However, the pain has been more constant and worsening since 3 PM   Patient presents from rehab facility  He was given full dose aspirin and 1 sublingual nitro prehospital   Patient has no cardiac history  Insert      PMH:  -Quadriplegia, anoxic brain injury, bipolar disorder  PSH:  -Not applicable    PE:   Vitals:    02/06/23 0320 02/06/23 0346 02/06/23 0430   BP: 123/70  105/63   BP Location: Left arm  Left arm   Pulse: 89  72   Resp: 20  15   Temp:  98 1 °F (36 7 °C)    TempSrc:  Oral    SpO2: 95%  97%         Constitutional: Vital signs are normal  He is cooperative  No distress  Chronically ill-appearing    HENT:   Mouth/Throat: Uvula is midline, oropharynx is clear and moist and mucous membranes are normal    Eyes: Pupils are equal, round, and reactive to light  Conjunctivae and EOM are normal    Neck: Trachea normal  No thyroid mass and no thyromegaly present  Cardiovascular: Normal rate, regular rhythm, normal heart sounds  No murmur heard  Pulmonary/Chest: Effort normal and breath sounds normal    Abdominal: Soft  Normal appearance and bowel sounds are normal  There is no tenderness  There is no rebound, no guarding  Neurological: He is alert  Skin: Skin is warm, dry and intact  Psychiatric: He has a normal mood and affect  His speech is normal and behavior is normal  Thought content normal       A:  -This is a 69-year-old male with a history of quadriplegia, anoxic brain injury, bipolar disorder who presents with chest pain  P:  -Anxiety versus ACS versus GERD  Patient has no cardiac history and symptoms are inconsistent with ACS  However, will check EKG and troponin to evaluate for ACS  Metabolic panel to evaluate for abnormal renal function or electrolyte abnormalities  CBC to evaluate for anemia or evidence of infection  - 13 point ROS was performed and all are normal unless stated in the history above  - Nursing note reviewed  Vitals reviewed  - Orders placed by myself and/or advanced practitioner / resident     - Previous chart was reviewed  - No language barrier    - History obtained from patient  - There are no limitations to the history obtained  - Critical care time: Not applicable for this patient  Medications - No data to display  XR chest 1 view portable   ED Interpretation   No acute cardiopulmonary disease as interpreted by myself  Orders Placed This Encounter   Procedures   • XR chest 1 view portable   • CBC and differential   • Comprehensive metabolic panel   • HS Troponin 0hr (reflex protocol)   • HS Troponin I 2hr   • HS Troponin I 4hr   • Notify physician of an increase in chest pain, symptomatic hypotension, a change in cardiac rhythm, or an O2 saturation of less than 90%     • Notify physician immediately if patient has persistent Chest Pain  • Insert peripheral IV   • Continuous cardiac monitoring   • Continuous pulse oximetry   • ECG 12 lead   • ECG 12 lead   • ECG 12 lead     Labs Reviewed   COMPREHENSIVE METABOLIC PANEL - Abnormal       Result Value Ref Range Status    Sodium 135  135 - 147 mmol/L Final    Potassium 3 6  3 5 - 5 3 mmol/L Final    Chloride 99  96 - 108 mmol/L Final    CO2 30  21 - 32 mmol/L Final    ANION GAP 6  4 - 13 mmol/L Final    BUN 13  5 - 25 mg/dL Final    Creatinine 0 80  0 60 - 1 30 mg/dL Final    Comment: Standardized to IDMS reference method    Glucose 91  65 - 140 mg/dL Final    Comment: If the patient is fasting, the ADA then defines impaired fasting glucose as > 100 mg/dL and diabetes as > or equal to 123 mg/dL  Specimen collection should occur prior to Sulfasalazine administration due to the potential for falsely depressed results  Specimen collection should occur prior to Sulfapyridine administration due to the potential for falsely elevated results  Calcium 8 9  8 4 - 10 2 mg/dL Final    AST 13  13 - 39 U/L Final    Comment: Specimen collection should occur prior to Sulfasalazine administration due to the potential for falsely depressed results  ALT 16  7 - 52 U/L Final    Comment: Specimen collection should occur prior to Sulfasalazine administration due to the potential for falsely depressed results       Alkaline Phosphatase 32 (*) 34 - 104 U/L Final    Total Protein 6 5  6 4 - 8 4 g/dL Final    Albumin 4 0  3 5 - 5 0 g/dL Final    Total Bilirubin 0 53  0 20 - 1 00 mg/dL Final    eGFR 98  ml/min/1 73sq m Final    Narrative:     [place] guidelines for Chronic Kidney Disease (CKD):   •  Stage 1 with normal or high GFR (GFR > 90 mL/min/1 73 square meters)  •  Stage 2 Mild CKD (GFR = 60-89 mL/min/1 73 square meters)  •  Stage 3A Moderate CKD (GFR = 45-59 mL/min/1 73 square meters)  •  Stage 3B Moderate CKD (GFR = 30-44 mL/min/1 73 square meters)  •  Stage 4 Severe CKD (GFR = 15-29 mL/min/1 73 square meters)  •  Stage 5 End Stage CKD (GFR <15 mL/min/1 73 square meters)  Note: GFR calculation is accurate only with a steady state creatinine   HS TROPONIN I 0HR - Normal    hs TnI 0hr 6  "Refer to ACS Flowchart"- see link ng/L Final    Comment:                                              Initial (time 0) result  If >=50 ng/L, Myocardial injury suggested ;  Type of myocardial injury and treatment strategy  to be determined  If 5-49 ng/L, a delta result at 2 hours and or 4 hours will be needed to further evaluate  If <4 ng/L, and chest pain has been >3 hours since onset, patient may qualify for discharge based on the HEART score in the ED  If <5 ng/L and <3hours since onset of chest pain, a delta result at 2 hours will be needed to further evaluate  HS Troponin 99th Percentile URL of a Health Population=12 ng/L with a 95% Confidence Interval of 8-18 ng/L  Second Troponin (time 2 hours)  If calculated delta >= 20 ng/L,  Myocardial injury suggested ; Type of myocardial injury and treatment strategy to be determined  If 5-49 ng/L and the calculated delta is 5-19 ng/L, consult medical service for evaluation  Continue evaluation for ischemia on ecg and other possible etiology and repeat hs troponin at 4 hours  If delta is <5 ng/L at 2 hours, consider discharge based on risk stratification via the HEART score (if in ED), or BREE risk score in IP/Observation  HS Troponin 99th Percentile URL of a Health Population=12 ng/L with a 95% Confidence Interval of 8-18 ng/L    HS TROPONIN I 2HR - Normal    hs TnI 2hr 4  "Refer to ACS Flowchart"- see link ng/L Final    Comment:                                              Initial (time 0) result  If >=50 ng/L, Myocardial injury suggested ;  Type of myocardial injury and treatment strategy  to be determined  If 5-49 ng/L, a delta result at 2 hours and or 4 hours will be needed to further evaluate    If <4 ng/L, and chest pain has been >3 hours since onset, patient may qualify for discharge based on the HEART score in the ED  If <5 ng/L and <3hours since onset of chest pain, a delta result at 2 hours will be needed to further evaluate  HS Troponin 99th Percentile URL of a Health Population=12 ng/L with a 95% Confidence Interval of 8-18 ng/L  Second Troponin (time 2 hours)  If calculated delta >= 20 ng/L,  Myocardial injury suggested ; Type of myocardial injury and treatment strategy to be determined  If 5-49 ng/L and the calculated delta is 5-19 ng/L, consult medical service for evaluation  Continue evaluation for ischemia on ecg and other possible etiology and repeat hs troponin at 4 hours  If delta is <5 ng/L at 2 hours, consider discharge based on risk stratification via the HEART score (if in ED), or BREE risk score in IP/Observation  HS Troponin 99th Percentile URL of a Health Population=12 ng/L with a 95% Confidence Interval of 8-18 ng/L      Delta 2hr hsTnI -2  <20 ng/L Final   CBC AND DIFFERENTIAL    WBC 5 00  4 31 - 10 16 Thousand/uL Final    RBC 4 48  3 88 - 5 62 Million/uL Final    Hemoglobin 13 2  12 0 - 17 0 g/dL Final    Hematocrit 37 4  36 5 - 49 3 % Final    MCV 84  82 - 98 fL Final    MCH 29 5  26 8 - 34 3 pg Final    MCHC 35 3  31 4 - 37 4 g/dL Final    RDW 11 6  11 6 - 15 1 % Final    MPV 9 1  8 9 - 12 7 fL Final    Platelets 727  915 - 390 Thousands/uL Final    nRBC 0  /100 WBCs Final    Neutrophils Relative 53  43 - 75 % Final    Immat GRANS % 0  0 - 2 % Final    Lymphocytes Relative 38  14 - 44 % Final    Monocytes Relative 8  4 - 12 % Final    Eosinophils Relative 0  0 - 6 % Final    Basophils Relative 1  0 - 1 % Final    Neutrophils Absolute 2 68  1 85 - 7 62 Thousands/µL Final    Immature Grans Absolute 0 01  0 00 - 0 20 Thousand/uL Final    Lymphocytes Absolute 1 89  0 60 - 4 47 Thousands/µL Final    Monocytes Absolute 0 38  0 17 - 1 22 Thousand/µL Final    Eosinophils Absolute 0  01  0 00 - 0 61 Thousand/µL Final    Basophils Absolute 0 03  0 00 - 0 10 Thousands/µL Final   HS TROPONIN I 4HR     Time reflects when diagnosis was documented in both MDM as applicable and the Disposition within this note     Time User Action Codes Description Comment    2/6/2023  6:10 AM Alem Martell [R07 [id number]] Chest pain       ED Disposition     ED Disposition   Discharge    Condition   Stable    Date/Time   Mon Feb 6, 2023  6:10 AM    Comment   Seb Carbone discharge to home/self care  Follow-up Information     Follow up With Specialties Details Why Contact Info Additional [address], MD Geriatric Medicine Schedule an appointment as soon as possible for a visit   [facility] [address] [place]   [address]        [facility] Emergency Department Emergency Medicine Go to   [facility] [id number]  [address] [place] [address] Emergency Department, [address] [place] [address], [address] [place] [address] [address] [facility] Cardiology   [address]  [address] [place] [address] Cardiology [address], [address] [place] [address]        Patient's Medications   Discharge Prescriptions    No medications on file     No discharge procedures on file  Prior to Admission Medications   Prescriptions Last Dose Informant Patient Reported? Taking?    Ciclopirox 1 % shampoo Unknown  Yes No   Sig: Apply topically 3 (three) times a week   DULoxetine (CYMBALTA) 60 mg delayed release capsule   Yes Yes   Sig: Take 60 mg by mouth daily   Multiple Vitamins-Minerals (Multilex-T&M) TABS   Yes Yes   Sig: Take 1 tablet by mouth daily   OLANZapine (ZyPREXA) 10 mg tablet   Yes Yes   Sig: Take 10 mg by mouth daily at bedtime   acetaminophen (TYLENOL) 325 mg tablet   Yes Yes   Sig: Take 650 mg by mouth every 4 (four) hours as needed for mild pain albuterol (2 5 mg/3 mL) 0 083 % nebulizer solution   Yes Yes   Sig: Take 2 5 mg by nebulization every 6 (six) hours as needed   aluminum-magnesium hydroxide 200-200 MG/5ML suspension   Yes Yes   Sig: Take 10 mL by mouth 3 (three) times a day as needed   chlorproMAZINE (THORAZINE) 25 mg tablet   Yes Yes   Sig: Take 200 mg by mouth daily at bedtime   diclofenac sodium (VOLTAREN) 1 %   Yes Yes   Sig: Apply 2 g topically 3 (three) times a day as needed  Apply to Right hand pain   ibuprofen (MOTRIN) 100 mg/5 mL suspension   Yes Yes   Sig: Take 20 mL by mouth every 4 (four) hours as needed   melatonin 3 mg   Yes Yes   Sig: Take 2 tablets by mouth daily at bedtime   polyvinyl alcohol (LIQUIFILM TEARS) 1 4 % ophthalmic solution   Yes Yes   Sig: Administer 2 drops to both eyes 4 (four) times a day   senna (SENOKOT) 8 6 MG tablet   Yes Yes   Sig: Take 1 tablet by mouth daily at bedtime  At bedtime   traZODone (DESYREL) 100 mg tablet   Yes Yes   Sig: Take 100 mg by mouth daily at bedtime   valproic acid (DEPAKENE) 250 mg capsule   Yes Yes   Sig: Take 250 mg by mouth every morning   valproic acid (DEPAKENE) 250 mg capsule   Yes Yes   Sig: Take 500 mg by mouth daily at bedtime      Facility-Administered Medications: None       Portions of the record may have been created with voice recognition software  Occasional wrong word or "sound a like" substitutions may have occurred due to the inherent limitations of voice recognition software  Read the chart carefully and recognize, using context, where substitutions have occurred          ED Course         Critical Care Time  Procedures

## 2023-02-06 NOTE — ED NOTES
Xray at bedside     Juan Carlos Hammond, [facility]  02/06/23 9974

## 2023-02-07 NOTE — PROGRESS NOTES
FOLLOW UP VISIT  Location: [facility]  POS: 32 ([facility])    NAME: Rad Martell  AGE: 61 y o  SEX: male    ASSESSMENT AND PROBLEMS:  1  Bipolar 1 disorder, depressed, moderate ([facility])  Assessment & Plan:  Continue with chlorpromazine, olanzapine, trazodone, folic acid  As well continue with increased dose of duloxetine and continue to monitor symptoms  If they do return may need to restart mirtazapine      2  Quadriplegia ([address] )  Assessment & Plan:  Stable  Continue supportive care      3  Slow transit constipation  Assessment & Plan:  Stable  Continue senna      4  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 [facility])  Assessment & Plan:  Stable  Continue supportive care        CHIEF COMPLAINT:  Monthly visit    HISTORY OF PRESENT ILLNESS:  History taken from patient    Patient seen today after emergency room visit for chest pain  During stay patient had serial troponins checked, chest x-ray and EKG  Patient was thought to be having low risk symptoms regarding cardiac chest pain considering atypical symptoms followed by negative testing and he was discharged back to long-term care  At the moment patient reports that he is back to his usual self and does not have any more chest pain  Patient reports that this was related to his anxiety, which she reports is feeling better at the moment  Recently duloxetine was increased from 30 back to 60 mg 3 days ago      REVIEW OF SYSTEMS:  Complete ROS negative other than as stated in HPI    HISTORY:  Medical Hx: Reviewed, unchanged  Family Hx: Reviewed, unchanged  Soc Hx: Reviewed, unchanged  No Known Allergies    PHYSICAL EXAM:  Vital Signs: /75, temp 98 8, HR 76, RR 18, O2 97% on room air  General: NAD, sitting in wheelchair  Eye Exam: anicteric sclera, no discharge  ENT: moist mucous membranes  Cardiovascular: regular rate, regular rhythm, no murmurs, rubs, or gallops  Pulmonary: no wheeze, no rhonchi  Abdominal: soft, nontender, nondistended, bowel sounds audible  Neurological: Poor coordination of all 4 extremities  Skin: No significant lesions appreciated, no significant rashes appreciated    PERTINENT LABS/IMAGING DATA:  Lab Results   Component Value Date    WBC 5 00 02/06/2023    HGB 13 2 02/06/2023    HCT 37 4 02/06/2023    MCV 84 02/06/2023     02/06/2023     Lab Results   Component Value Date    SODIUM 135 02/06/2023    K 3 6 02/06/2023    CL 99 02/06/2023    CO2 30 02/06/2023    BUN 13 02/06/2023    CREATININE 0 80 02/06/2023    GLUC 91 02/06/2023    CALCIUM 8 9 02/06/2023 2/6/23: Troponins: 6 then 4 ng/L    2/6/2023: Portable chest x-ray: No acute cardiopulmonary disease    2/6/2023: EKG normal sinus rhythm    Provider: Dilcia Carroll MD MPH  Patient: Rober Meneses

## 2023-02-07 NOTE — ASSESSMENT & PLAN NOTE
Continue with chlorpromazine, olanzapine, trazodone, folic acid  As well continue with increased dose of duloxetine and continue to monitor symptoms    If they do return may need to restart mirtazapine

## 2023-02-13 ENCOUNTER — NURSING HOME VISIT (OUTPATIENT)
Dept: GERIATRICS | Facility: OTHER | Age: 58
End: 2023-02-13

## 2023-02-13 DIAGNOSIS — F31.32 BIPOLAR 1 DISORDER, DEPRESSED, MODERATE (HCC): Primary | Chronic | ICD-10-CM

## 2023-02-14 NOTE — PROGRESS NOTES
FOLLOW UP VISIT  Location: [facility]  POS: 32 ([facility])    NAME: Marlin Rodríguez  AGE: 61 y o  SEX: male    ASSESSMENT AND PROBLEMS:  1  Bipolar 1 disorder, depressed, moderate ([facility])  Assessment & Plan:  Continue with duloxetine  Continue olanzapine, trazodone, valproic acid  Decrease chlorpromazine to 150 mg daily and monitor mood        CHIEF COMPLAINT:  Mood disorder    HISTORY OF PRESENT ILLNESS:  History taken from patient    Reports doing much better now after recent increase in duloxetine back to previous dose of 60 mg daily  As well continues with olanzapine, trazodone, valproic acid and chlorpromazine      REVIEW OF SYSTEMS:  Complete ROS negative other than as stated in HPI    HISTORY:  Medical Hx: Reviewed, unchanged  Family Hx: Reviewed, unchanged  Soc Hx: Reviewed, unchanged  No Known Allergies    PHYSICAL EXAM:  Vital Signs: /75, temp 98 8, HR 76, RR 18, O2 97% room air  General: NAD sitting wheelchair  Eye Exam: anicteric sclera, no discharge  ENT: moist mucous membranes  Cardiovascular: regular rate, regular rhythm, no murmurs, rubs, or gallops  Pulmonary: no wheeze, no rhonchi  Abdominal: soft, nontender, nondistended, bowel sounds audible  Neurological: Awake alert, with generalized poor coordination of all 4 extremities  Psychiatric: Appears euthymic, insight intact, thought processes appropriate  Skin: No significant lesions appreciated, no significant rashes appreciated    Provider: Pete Dewitt MD MPH  Patient: Marlin Rodríguez

## 2023-02-14 NOTE — ASSESSMENT & PLAN NOTE
Continue with duloxetine  Continue olanzapine, trazodone, valproic acid    Decrease chlorpromazine to 150 mg daily and monitor mood

## 2023-02-23 ENCOUNTER — NURSING HOME VISIT (OUTPATIENT)
Dept: GERIATRICS | Facility: OTHER | Age: 58
End: 2023-02-23

## 2023-02-23 DIAGNOSIS — F31.32 BIPOLAR 1 DISORDER, DEPRESSED, MODERATE (HCC): Primary | Chronic | ICD-10-CM

## 2023-02-23 NOTE — ASSESSMENT & PLAN NOTE
Did not tolerate chlorpromazine taper    We will continue with increased dose of chlorpromazine back to 200 mg every evening as well as chronic olanzapine, trazodone, valproic acid, duloxetine

## 2023-02-23 NOTE — PROGRESS NOTES
FOLLOW UP VISIT  Location: [facility]  POS: 32 ([facility])    NAME: Paulina Wilson  AGE: 61 y o  SEX: male    ASSESSMENT AND PROBLEMS:  1  Bipolar 1 disorder, depressed, moderate ([address] )  Assessment & Plan:  Did not tolerate chlorpromazine taper  We will continue with increased dose of chlorpromazine back to 200 mg every evening as well as chronic olanzapine, trazodone, valproic acid, duloxetine      CHIEF COMPLAINT:  Depression    HISTORY OF PRESENT ILLNESS:  History taken from patient and nursing staff  Patient recently was complaining of severely increased depression again talking about the devil getting into his mind attempting him although he did not given to temptations  I was alerted by nursing staff of this and increased chlorpromazine back to previous dose 200 mg at bedtime  Patient and nursing staff agree that he does seem to be doing better regarding mood at this point  However, he does still complain somewhat about his sleep which is not doing great    Also continues on valproic acid, trazodone, olanzapine, duloxetine    REVIEW OF SYSTEMS:  Complete ROS negative other than as stated in HPI    HISTORY:  Medical Hx: Reviewed, unchanged  Family Hx: Reviewed, unchanged  Soc Hx: Reviewed, unchanged  No Known Allergies    PHYSICAL EXAM:  Vital Signs: /75, temp 98 8, HR 76, RR 18, O2 97% on room air  General: NAD, sitting wheelchair  Eye Exam: anicteric sclera, no discharge  ENT: moist mucous membranes  Cardiovascular: regular rate, regular rhythm, no murmurs, rubs, or gallops  Pulmonary: no wheeze, no rhonchi  Abdominal: soft, nontender, nondistended, bowel sounds audible  Neurological: Poor coordination of all 4 extremities, awake alert  Skin: No significant lesions appreciated, no significant rashes appreciated    Provider: Albina Horvath MD MPH  Patient: Paulina Wilson

## 2023-02-27 ENCOUNTER — NURSING HOME VISIT (OUTPATIENT)
Dept: GERIATRICS | Facility: OTHER | Age: 58
End: 2023-02-27

## 2023-02-27 DIAGNOSIS — F31.32 BIPOLAR 1 DISORDER, DEPRESSED, MODERATE (HCC): Primary | ICD-10-CM

## 2023-02-27 PROBLEM — F31.9 BIPOLAR 1 DISORDER (HCC): Status: ACTIVE | Noted: 2019-07-02

## 2023-02-27 RX ORDER — CLONAZEPAM 0.5 MG/1
0.5 TABLET ORAL
COMMUNITY

## 2023-02-28 NOTE — PROGRESS NOTES
Cardiology Office Note  MD Cielo Vincent MD Carolan Felts, DO, MD López Tomas DO, Alessandro Lowe DO, [facility] - [place]  ----------------------------------------------------------------  [address] [facility]  [address] [place] [address], [address] [facility]    Jayme Gibson 61 y o  male MRN: [id number]  Unit/Bed#:  Encounter: [id number]      History of Present Illness: It was a pleasure to see Jayme Gibson in the office today for initial CV evaluation  He has a past medical history of hypertension, quadriplegia, anoxic brain injury due to cardiopulmonary arrest from drug overdose, dysarthria and PVD  He established care with us in March 2023  In February 2023, the patient had been thinking about his episode in 1993 where he ended up having an arrest with anoxic brain injury  The episode gave him very significant anxiety which resulted in chest discomfort  He is unclear that the chest discomfort is definitely related to his anxiety, but he did feel anxious during the episode  He was described as a heaviness tightness sensation in the chest   Since the episode, his symptoms have resolved  At baseline, he is somewhat restricted in his ambulation and is wheelchair-bound  Denies lower extremity swelling, orthopnea or paroxysmal nocturnal dyspnea  Denies lightheadedness, dizziness or palpitations  Review of Systems:  Review of Systems   Constitutional: Negative for decreased appetite, fever, weight gain and weight loss  HENT: Negative for congestion and sore throat  Eyes: Negative for visual disturbance  Cardiovascular: Positive for chest pain  Negative for dyspnea on exertion, leg swelling, near-syncope and palpitations  Respiratory: Negative for cough and shortness of breath  Hematologic/Lymphatic: Negative for bleeding problem  Skin: Negative for rash  Musculoskeletal: Negative for myalgias and neck pain     Gastrointestinal: Negative for abdominal pain and nausea  Neurological: Negative for light-headedness and weakness  Psychiatric/Behavioral: Negative for depression  Past Medical History:   Diagnosis Date   • Ataxia    • Bipolar 1 disorder ([place] [address] )    • Depression    • Diplopia    • Dysarthria    • Hypertension    • Insomnia    • Onychomycosis    • Optic atrophy    • PVD (peripheral vascular disease) ([place] [address] )    • Quadriplegia ([place] [address] )        History reviewed  No pertinent surgical history      Social History     Socioeconomic History   • Marital status: /Civil Union     Spouse name: None   • Number of children: None   • Years of education: None   • Highest education level: None   Occupational History   • None   Tobacco Use   • Smoking status: Never   • Smokeless tobacco: Never   Substance and Sexual Activity   • Alcohol use: Not Currently   • Drug use: Never   • Sexual activity: Not Currently   Other Topics Concern   • None   Social History Narrative   • None     Social Determinants of Health     Financial Resource Strain: Not on file   Food Insecurity: Not on file   Transportation Needs: Not on file   Physical Activity: Not on file   Stress: Not on file   Social Connections: Not on file   Intimate Partner Violence: Not on file   Housing Stability: Not on file       Family History   Problem Relation Age of Onset   • No Known Problems Mother    • No Known Problems Father        No Known Allergies      Current Outpatient Medications:   •  acetaminophen (TYLENOL) 325 mg tablet, Take 650 mg by mouth every 4 (four) hours as needed for mild pain, Disp: , Rfl:   •  albuterol (2 5 mg/3 mL) 0 083 % nebulizer solution, Take 2 5 mg by nebulization every 6 (six) hours as needed, Disp: , Rfl:   •  aluminum-magnesium hydroxide 200-200 MG/5ML suspension, Take 10 mL by mouth 3 (three) times a day as needed, Disp: , Rfl:   •  chlorproMAZINE (THORAZINE) 25 mg tablet, Take 200 mg by mouth daily at bedtime, Disp: , Rfl:   •  diclofenac sodium (VOLTAREN) 1 %, Apply 2 g topically 3 (three) times a day as needed  Apply to Right hand pain, Disp: , Rfl:   •  DULoxetine (CYMBALTA) 60 mg delayed release capsule, Take 60 mg by mouth daily, Disp: , Rfl:   •  ibuprofen (MOTRIN) 100 mg/5 mL suspension, Take 20 mL by mouth every 4 (four) hours as needed, Disp: , Rfl:   •  ipratropium-albuterol (DUO-NEB) 0 5-2 5 mg/3 mL nebulizer solution, Take 3 mL by nebulization 4 (four) times a day, Disp: , Rfl:   •  melatonin 3 mg, Take 2 tablets by mouth daily at bedtime, Disp: , Rfl:   •  Multiple Vitamins-Minerals (Multilex-T&M) TABS, Take 1 tablet by mouth daily, Disp: , Rfl:   •  OLANZapine (ZyPREXA) 10 mg tablet, Take 10 mg by mouth daily at bedtime, Disp: , Rfl:   •  polyvinyl alcohol (LIQUIFILM TEARS) 1 4 % ophthalmic solution, Administer 2 drops to both eyes 4 (four) times a day, Disp: , Rfl:   •  SALINE NA, 1 spray into each nostril, Disp: , Rfl:   •  senna (SENOKOT) 8 6 MG tablet, Take 1 tablet by mouth daily at bedtime  At bedtime, Disp: , Rfl:   •  traZODone (DESYREL) 100 mg tablet, Take 100 mg by mouth daily at bedtime, Disp: , Rfl:   •  valproic acid (DEPAKENE) 250 mg capsule, Take 250 mg by mouth every morning, Disp: , Rfl:   •  valproic acid (DEPAKENE) 250 mg capsule, Take 500 mg by mouth daily at bedtime, Disp: , Rfl:   •  Ciclopirox 1 % shampoo, Apply topically 3 (three) times a week (Patient not taking: Reported on 3/1/2023), Disp: , Rfl:   •  clonazePAM (KlonoPIN) 0 5 mg tablet, Take 0 5 mg by mouth daily at bedtime (Patient not taking: Reported on 3/1/2023), Disp: , Rfl:     Vitals:    03/01/23 0959   BP: 120/80   Pulse: 81   Weight: 89 8 kg (198 lb)   Height: 6' 2" (1 88 m)     Body mass index is 25 42 kg/m²      PHYSICAL EXAMINATION:  Gen: Awake, Alert, NAD   Head/eyes: AT/NC, pupils equal and round, Anicteric  ENT: mmm  Neck: Supple, No elevated JVP, trachea midline  Resp: CTA bilaterally no w/r/r  CV: RRR +S1, S2, No m/r/g  Abd: Soft, NT/ND + BS  Ext: no LE edema bilaterally  Neuro: Follows commands, moves all extermities  Psych: Appropriate affect, normal mood, pleasant attitude, non-combative  Skin: warm; no rash, erythema or venous stasis changes on exposed skin    --------------------------------------------------------------------------------  TREADMILL STRESS  No results found for this or any previous visit      --------------------------------------------------------------------------------  NUCLEAR STRESS TEST: No results found for this or any previous visit  No results found for this or any previous visit       --------------------------------------------------------------------------------  CATH:  No results found for this or any previous visit     --------------------------------------------------------------------------------  ECHO:   No results found for this or any previous visit  No results found for this or any previous visit     --------------------------------------------------------------------------------  HOLTER  No results found for this or any previous visit      No results found for this or any previous visit     --------------------------------------------------------------------------------  CAROTIDS  No results found for this or any previous visit      --------------------------------------------------------------------------------  ECGs:  Results for orders placed or performed in visit on 03/01/23   POCT ECG    Impression    Sinus rhythm 81 bpm, poor R wave progression        Lab Results   Component Value Date    WBC 5 00 02/06/2023    HGB 13 2 02/06/2023    HCT 37 4 02/06/2023    MCV 84 02/06/2023     02/06/2023      Lab Results   Component Value Date    SODIUM 135 02/06/2023    K 3 6 02/06/2023    CL 99 02/06/2023    CO2 30 02/06/2023    BUN 13 02/06/2023    CREATININE 0 80 02/06/2023    GLUC 91 02/06/2023    CALCIUM 8 9 02/06/2023      No results found for: HGBA1C   No results found for: CHOL  No results found for: HDL  No results found for: [address]  No results found for: TRIG  No results found for: CHOLHDL   No results found for: INR, PROTIME     1  Precordial chest pain  -     NM myocardial perfusion spect (rx stress and/or rest); Future; Expected date: 03/01/2023  -     Comprehensive metabolic panel  -     TSH, 3rd generation with Free T4 reflex  -     Lipid Panel with Direct LDL reflex; Future  -     CBC and differential    2  Chronic diastolic heart failure (HCC)  -     POCT ECG  -     Echo complete w/ contrast if indicated; Future; Expected date: 03/01/2023  -     NM myocardial perfusion spect (rx stress and/or rest); Future; Expected date: 03/01/2023  -     Comprehensive metabolic panel  -     TSH, 3rd generation with Free T4 reflex  -     Lipid Panel with Direct LDL reflex; Future  -     CBC and differential    3  Dyslipidemia  -     POCT ECG  -     Comprehensive metabolic panel  -     TSH, 3rd generation with Free T4 reflex  -     Lipid Panel with Direct LDL reflex; Future  -     CBC and differential    4  Family history of heart disease  -     POCT ECG  -     Comprehensive metabolic panel  -     TSH, 3rd generation with Free T4 reflex  -     Lipid Panel with Direct LDL reflex; Future  -     CBC and differential        IMPRESSION:  • Precordial chest pain  • Chronic diastolic heart failure  o LVEF 55%, mild LVH, normal diastolic function, trace MR/TR/AR, April 2014  • Cardiopulmonary arrest due to drug overdose, 1993  o Type 2 MI likely due to heart failure s/p cath w/ normal coronary arteries, April 2014  • Anoxic brain injury with ataxic quadriplegia and dysarthria, 1993  • Dyslipidemia  • Family history of CAD    PLAN:  It was a pleasure to see Jameel Lopez in the office today for initial CV evaluation  He is here today due to his recent episode of chest discomfort in February 2023    The chest discomfort was associated with a sensation of anxiety, however he did have some significant heaviness and he does have multiple risk factors including a family history of CAD and dyslipidemia  It is definitively unclear that his symptoms were entirely related to his anxiety  He has no signs or symptoms of heart failure and examines to be euvolemic  ECG demonstrates poor R wave progression  He is tolerating his current medications without any reported adverse effects  Due to his history of anoxic injury, he is unable to perform greater than 4 METS on a daily basis  Based on his clinical presentation, the following recommendations:    1  Recommend checking pharmacologic nuclear stress test to assess for any evidence of underlying myocardial ischemia  2   We will obtain 2D echocardiogram to assess cardiac structure and function  3  Recommend heart healthy diet low in sodium and carbohydrate  4  No changes to his medications at this time  5  Check lipid panel, CMP, CBC and TSH  6  Should his symptoms recur, especially worsening in frequency or severity or change in quality, recommend seeking immediate medical attention/dial 911  7  We will follow-up with him after testing to review the results  As always, please do not hesitate to call with any questions  Portions of the record may have been created with voice recognition software  Occasional wrong word or "sound a like" substitutions may have occurred due to the inherent limitations of voice recognition software  Read the chart carefully and recognize, using context, where substitutions have occurred        Signed: Paolo Richards DO, [address] [place], [facility]

## 2023-02-28 NOTE — ASSESSMENT & PLAN NOTE
Continue chlorpromazine, olanzapine, trazodone, valproic acid, duloxetine  Start clonazepam 0 5 mg every evening as anxiolytic and assist with sleep  Counseled extensively regarding ongoing feeling of guilt and lack of self-worth  Recommend starting practice of meditation and practice of gratitude    Recommend awareness of brain he himself is a unhelpful practice and recommend discussing with psychology as well

## 2023-02-28 NOTE — PROGRESS NOTES
FOLLOW UP VISIT  [facility]  POS: 32 ([facility])    NAME: Ruben Hicks  AGE: 61 y o  SEX: male    ASSESSMENT AND PROBLEMS:  1  Bipolar 1 disorder, depressed, moderate ([facility])  Assessment & Plan:  Continue chlorpromazine, olanzapine, trazodone, valproic acid, duloxetine  Start clonazepam 0 5 mg every evening as anxiolytic and assist with sleep  Counseled extensively regarding ongoing feeling of guilt and lack of self-worth  Recommend starting practice of meditation and practice of gratitude  Recommend awareness of brain he himself is a unhelpful practice and recommend discussing with psychology as well      CHIEF COMPLAINT:  Depression    HISTORY OF PRESENT ILLNESS:  History taken from patient  Patient reports poor mental health and insomnia  He reports that he is the reincarnation of Marzena Moreno as he was told this in his vision by his guardian Gaudencio Collins  He reports many depressed thoughts and temptations and reports that the temp Tatian's is a result of the devil inside of his head  I also called and discussed extensively regarding his symptoms and medication history with his sister  She reports that he had history of mental health issues prior to anoxic brain injury related to medication overdose      REVIEW OF SYSTEMS:  ROS not able to be obtained due to cognitive/expressive limitations    HISTORY:  Medical Hx: Reviewed, unchanged  Family Hx: Reviewed, unchanged  Soc Hx: Reviewed, unchanged  No Known Allergies    PHYSICAL EXAM:  Vital Signs: /75, temp 98 8  General: NAD, sitting wheelchair  Eye Exam: anicteric sclera, no discharge  ENT: moist mucous membranes  Cardiovascular: regular rate, regular rhythm, no murmurs, rubs, or gallops  Pulmonary: no wheeze, no rhonchi  Abdominal: soft, nontender, nondistended, bowel sounds audible  Neurological: Awake alert, poor coordination of all 4 extremities, with dysarthria  Skin: No significant lesions appreciated, no significant rashes appreciated    Provider: Nestor Pagan MD MPH  Patient: Matt Wright     I have spent a total time of 30 minutes on 02/27/23 in caring for this patient including Risks and benefits of tx options, Patient and family education, Impressions, Documenting in the medical record and Obtaining or reviewing history

## 2023-03-01 ENCOUNTER — OFFICE VISIT (OUTPATIENT)
Dept: CARDIOLOGY CLINIC | Facility: CLINIC | Age: 58
End: 2023-03-01

## 2023-03-01 VITALS
DIASTOLIC BLOOD PRESSURE: 80 MMHG | SYSTOLIC BLOOD PRESSURE: 120 MMHG | HEART RATE: 81 BPM | WEIGHT: 198 LBS | BODY MASS INDEX: 25.41 KG/M2 | HEIGHT: 74 IN

## 2023-03-01 DIAGNOSIS — E78.5 DYSLIPIDEMIA: ICD-10-CM

## 2023-03-01 DIAGNOSIS — R07.2 PRECORDIAL CHEST PAIN: Primary | ICD-10-CM

## 2023-03-01 DIAGNOSIS — I50.32 CHRONIC DIASTOLIC HEART FAILURE (HCC): ICD-10-CM

## 2023-03-01 DIAGNOSIS — Z82.49 FAMILY HISTORY OF HEART DISEASE: ICD-10-CM

## 2023-03-01 RX ORDER — IPRATROPIUM BROMIDE AND ALBUTEROL SULFATE 2.5; .5 MG/3ML; MG/3ML
3 SOLUTION RESPIRATORY (INHALATION) 4 TIMES DAILY
COMMUNITY

## 2023-03-07 ENCOUNTER — NURSING HOME VISIT (OUTPATIENT)
Dept: GERIATRICS | Facility: OTHER | Age: 58
End: 2023-03-07

## 2023-03-07 DIAGNOSIS — K59.01 SLOW TRANSIT CONSTIPATION: Chronic | ICD-10-CM

## 2023-03-07 DIAGNOSIS — G47.59 OTHER PARASOMNIA: ICD-10-CM

## 2023-03-07 DIAGNOSIS — F31.32 BIPOLAR 1 DISORDER, DEPRESSED, MODERATE (HCC): Primary | ICD-10-CM

## 2023-03-07 DIAGNOSIS — G82.50 QUADRIPARESIS (HCC): Chronic | ICD-10-CM

## 2023-03-07 NOTE — PROGRESS NOTES
MONTHLY VISIT  [facility]  POS: [facility]    NAME: Heidy Bourne  AGE: 61 y o  SEX: male    DATE OF ENCOUNTER: 3/7/2023    ASSESSMENT AND PROBLEMS:  1  Bipolar 1 disorder, depressed, moderate ([facility])  Assessment & Plan:  Not at goal  Continue chlorpromazine, olanzapine, trazodone, valproic acid, duloxetine - could consider increasing valproic acid dosing  Recently started on clonazepam 0 5 mg every evening to assist with sleep, consider increasing dose as patient still has trouble sleeping  Encourage relaxation techniques, medication  Encourage sleep hygiene, avoid day time naps  Social engagement  Continue to follow with psychology         2  Other parasomnia  Assessment & Plan:  Ongoing difficulty sleeping at night  Recently started on clonazepam- monitor, consider increasing dose  Continue Trazodone, Zyprexia, Valproic Acid  Encourage sleep hygiene and avoid day time naps      3  Quadriparesis [facility])  Assessment & Plan:  Stable  Continue ongoing supportive care at [address] [place] [address]      4  Slow transit constipation  Assessment & Plan:  Stable  Continue Senna         CHIEF COMPLAINT:  Monthly Visit    HISTORY OF PRESENT ILLNESS:  History taken from patient, staff, chart    Lynne Melendez is a 60 y/o male with hx including but not limited to quadriplegia, depression, bipolar, hypertension, and insomnia seen today for monthly visit and depressive-like symptoms  Continues with ongoing supportive care at [address] [place] [address] in setting of quadriplegia and cerebellar ataxia  Continues with wheelchair use and assistance with ADLs  Upon today's assessment, patient seen laying in bed, reports he is feeling sad  He reports feeling sad and depressed and often thinks about his past   He does follow with psychology, with recent Worship delusions, has bipolar disorder and insomnia  He reports he is not sleeping at night and this has been an ongoing issue for some time    Clonazepam recently started at bedtime, he continues on Thorazine, Cymbalta, Zyprexa, trazodone, valproic acid  He reports normal appetite  REVIEW OF SYSTEMS:  Complete ROS negative other than as stated in HPI    HISTORY:  Medical Hx: Reviewed, unchanged  Family Hx: Reviewed, unchanged  Soc Hx: Reviewed, unchanged  No Known Allergies    PHYSICAL EXAM:  Vital Signs: /69, temp 98, HR 86, RR 16, O2 93% on room air  Weight 189 pounds    General: NAD, laying in bed  Eye Exam: anicteric sclera, no discharge, EOMI intact  ENT: moist mucous membranes  Cardiovascular: regular rate, regular rhythm, no murmurs, rubs, or gallops  Pulmonary: no wheeze, no rhonchi, CTA, unlabored  Abdominal: soft, nontender, nondistended, bowel sounds audible  Neurological: slurred speech  Awake, alert, engaged in conversation, upright position  Generalized weakness  Skin: No significant lesions appreciated, no significant rashes appreciated  Psych: pleasant, cooperative, appears depressed, flat affect    PERTINENT LABS/IMAGING DATA:    3/3/2023 CBC, CMP, lipid panel: Hemoglobin 12 9, WBC 4 2, platelet 659, BUN 17, creatinine 0 8, sodium 142, potassium 4 1, alk phos 32, total protein 5 9, EGFR 100, cholesterol 163, triglyceride 100, HDL 44, LDL 99  TSH 2 48    12/16 CBC, CMP: Hemoglobin 13 5, WBC 3 6, BUN 21, creatinine 0 8, sodium 138, potassium 3 8    CURRENT MEDICATIONS:  All medications reviewed and updated in Nursing Home EMR      Provider: Kendra MEDINA  Patient: Cristina Beckman   3/7/2023

## 2023-03-07 NOTE — ASSESSMENT & PLAN NOTE
Ongoing difficulty sleeping at night  Recently started on clonazepam- monitor, consider increasing dose  Continue Trazodone, Zyprexia, Valproic Acid  Encourage sleep hygiene and avoid day time naps

## 2023-03-07 NOTE — ASSESSMENT & PLAN NOTE
Not at goal  Continue chlorpromazine, olanzapine, trazodone, valproic acid, duloxetine - could consider increasing valproic acid dosing  Recently started on clonazepam 0 5 mg every evening to assist with sleep, consider increasing dose as patient still has trouble sleeping      Encourage relaxation techniques, medication  Encourage sleep hygiene, avoid day time naps  Social engagement  Continue to follow with psychology

## 2023-04-03 ENCOUNTER — NURSING HOME VISIT (OUTPATIENT)
Dept: GERIATRICS | Facility: OTHER | Age: 58
End: 2023-04-03

## 2023-04-03 DIAGNOSIS — G82.50 QUADRIPARESIS (HCC): Primary | Chronic | ICD-10-CM

## 2023-04-03 DIAGNOSIS — F31.32 BIPOLAR 1 DISORDER, DEPRESSED, MODERATE (HCC): Chronic | ICD-10-CM

## 2023-04-03 DIAGNOSIS — G32.81 CEREBELLAR ATAXIA IN DISEASES CLASSIFIED ELSEWHERE (HCC): Chronic | ICD-10-CM

## 2023-04-03 DIAGNOSIS — K59.01 SLOW TRANSIT CONSTIPATION: Chronic | ICD-10-CM

## 2023-04-04 NOTE — PROGRESS NOTES
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 [facility])  Assessment & Plan:  Stable  Continue supportive care      3  Slow transit constipation  Assessment & Plan:  Stable  Continue senna      4  Bipolar 1 disorder, depressed, moderate (HCC)  Assessment & Plan:  Appears improved  We will continue with chlorpromazine, olanzapine, trazodone, valproic acid, duloxetine  Patient reports wanting to take new supplement Rhodiola rosacea capsules, purchased by family  Will start daily and monitor  CHIEF COMPLAINT:  Monthly Visit    HISTORY OF PRESENT ILLNESS:  History taken from patient  He reports his mood is doing well  Reports that he is sleeping well  Denies changes in his neuromuscular status      REVIEW OF SYSTEMS:  Complete ROS negative other than as stated in HPI    HISTORY:  Medical Hx: Reviewed, unchanged  Family Hx: Reviewed, unchanged  Soc Hx: Reviewed, unchanged  No Known Allergies    PHYSICAL EXAM:  Vital Signs: /68, temp 98 4, HR 97, RR 18, O2 95% on room air  General: NAD  Eye Exam: anicteric sclera, no discharge  ENT: moist mucous membranes  Cardiovascular: regular rate, regular rhythm, no murmurs, rubs, or gallops  Pulmonary: no wheeze, no rhonchi  Abdominal: soft, nontender, nondistended, bowel sounds audible  Neurological: Awake, alert, poor coordination of all 4 extremities  Skin: No significant lesions appreciated    PERTINENT LABS/IMAGING DATA:  3/3/2023: Hemoglobin 12 9, WBC 4 2, platelet 921, creatinine 0 88, sodium 142, potassium 4 1    Provider: Nathalie Mcburney MD MPH  Patient: Stacie Benz

## 2023-04-04 NOTE — ASSESSMENT & PLAN NOTE
Appears improved  We will continue with chlorpromazine, olanzapine, trazodone, valproic acid, duloxetine  Patient reports wanting to take new supplement Rhodiola rosacea capsules, purchased by family  Will start daily and monitor

## 2023-04-05 ENCOUNTER — HOSPITAL ENCOUNTER (OUTPATIENT)
Dept: NON INVASIVE DIAGNOSTICS | Facility: HOSPITAL | Age: 58
Discharge: HOME | End: 2023-04-05
Attending: INTERNAL MEDICINE

## 2023-04-05 VITALS
WEIGHT: 198 LBS | DIASTOLIC BLOOD PRESSURE: 80 MMHG | HEART RATE: 77 BPM | BODY MASS INDEX: 25.41 KG/M2 | HEIGHT: 74 IN | SYSTOLIC BLOOD PRESSURE: 120 MMHG

## 2023-04-05 VITALS — WEIGHT: 198 LBS | BODY MASS INDEX: 25.41 KG/M2 | HEIGHT: 74 IN

## 2023-04-05 DIAGNOSIS — I50.32 CHRONIC DIASTOLIC HEART FAILURE (HCC): ICD-10-CM

## 2023-04-05 LAB
AORTIC ROOT: 3.3 CM
APICAL FOUR CHAMBER EJECTION FRACTION: 63 %
E WAVE DECELERATION TIME: 192 MS
FRACTIONAL SHORTENING: 40 (ref 28–44)
INTERVENTRICULAR SEPTUM IN DIASTOLE (PARASTERNAL SHORT AXIS VIEW): 1 CM
INTERVENTRICULAR SEPTUM: 1 CM (ref 0.6–1.1)
IVC: 20 MM
LAAS-AP2: 19.1 CM2
LAAS-AP4: 17.4 CM2
LEFT ATRIUM SIZE: 2.8 CM
LEFT INTERNAL DIMENSION IN SYSTOLE: 2.4 CM (ref 2.1–4)
LEFT VENTRICULAR INTERNAL DIMENSION IN DIASTOLE: 4 CM (ref 3.5–6)
LEFT VENTRICULAR POSTERIOR WALL IN END DIASTOLE: 1 CM
LEFT VENTRICULAR STROKE VOLUME: 49 ML
LVSV (TEICH): 49 ML
MV E'TISSUE VEL-SEP: 8 CM/S
MV PEAK A VEL: 0.77 M/S
MV PEAK E VEL: 72 CM/S
MV STENOSIS PRESSURE HALF TIME: 56 MS
MV VALVE AREA P 1/2 METHOD: 3.93
RA PRESSURE ESTIMATED: 5 MMHG
RIGHT ATRIUM AREA SYSTOLE A4C: 14.3 CM2
RIGHT VENTRICLE ID DIMENSION: 3 CM
RV PSP: 17 MMHG
SL CV LEFT ATRIUM LENGTH A2C: 4.9 CM
SL CV LV EF: 63
SL CV PED ECHO LEFT VENTRICLE DIASTOLIC VOLUME (MOD BIPLANE) 2D: 68 ML
SL CV PED ECHO LEFT VENTRICLE SYSTOLIC VOLUME (MOD BIPLANE) 2D: 20 ML
TR MAX PG: 12 MMHG
TR PEAK VELOCITY: 1.7 M/S
TRICUSPID ANNULAR PLANE SYSTOLIC EXCURSION: 2.3 CM
TRICUSPID VALVE PEAK REGURGITATION VELOCITY: 1.7 M/S

## 2023-04-05 RX ADMIN — REGADENOSON 0.4 MG: 0.08 INJECTION, SOLUTION INTRAVENOUS at 13:24

## 2023-04-06 ENCOUNTER — TELEPHONE (OUTPATIENT)
Dept: CARDIOLOGY CLINIC | Facility: CLINIC | Age: 58
End: 2023-04-06

## 2023-04-06 NOTE — TELEPHONE ENCOUNTER
Placed call to facility  They had just printed off results from his portal  I explained it was normal  She verbalized understanding

## 2023-04-06 NOTE — TELEPHONE ENCOUNTER
----- Message from Schuyler Shaw DO sent at 4/5/2023  7:03 PM EDT -----  Stress test was found to be negative for myocardial ischemia  Please reach out to the patient and let him know this good news  Thank you

## 2023-04-17 NOTE — PROGRESS NOTES
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 ([address] )  Stable  Continue 24/7 LTCF supportive care  Fall precaution      Bipolar 1 disorder with moderate tavo (HCC)  Stable  Denies any mood and behavior disturbance on this visit  Followed with medication management by [facility] group  Currently on the following meds:  * Chlorpromazine 200mg at bedtime  * Clonazepam 1 5mg at HS  * Duloxetine 60mg BID  * Olanzapine 10mg at bedtime  * Valproic acid BID 250mg in AM/ 500mg at HS  * Mirtazapine 15mg at bedtime  * Trazodone 100mg at bedtime  Renal and hepatic functions stable (12/14/2021)  Continue Q6 months CMP checks  Next due on June, 2022     Other parasomnia  Patient reported disrupted sleep since with new room mate  Coordinated with  on this visit  Continue the following meds:  * Melatonin 3mg at bedtime  * Mirtazapine 15mg at bedtime  * Trazodone 100mg at bedtime  Followed and managed by [facility]      Slow transit constipation  Continent and self toilets  Reported regular daily bowel movements  Continue Senna 2 tablets daily     GERD  Asymptomatic  Continue Pantoprazole 40mg daily    Chief complaint / Reason for visit: Follow-up visit    History of Present Illness: This is a 61 y o  Male patient admitted at [facility] for Anoxic Encephalopathy with Cerebella Ataxia  Patient is seen and examined today to follow-up acute and chronic medical conditions as mentioned above and Bipolar disorder, Parasomnia and Constipation  Patient is out of bed for this visit sitting in manual  wheelchair in room - initially found asleep but was easily awakened and able to maintain full wakefulness during this visit    Patient is verbal with clear coherent speech - oriented to name/birthday/current date and place  Patient stated, " I'm alright"  Noted red rimmed eyes and sleepiness - patient reported that he has not been sleeping night, stated, " There's too many people going in and out of my room at night  I wakes and I can't go back to sleep"  Patient has a new roommate after patient's wife passed away  Otherwise patient denies any other acute medical concerns during ROS assessment including pain  Spoke and coordinated with SW on this visit regarding patient concerns of poor sleep - for possible room transfer with roommate who does not require a lot of care nighttime  Patient ready on multiple medications  Per nursing, patient has been a little bit more quiet and had expressed poor sleep in the past few days - otherwise no acute medical concerns for this visit  Review of Systems:  Per history of present illness, all other systems reviewed and negative  HISTORY:  Medical Hx: Reviewed, unchanged  Family Hx: Reviewed, unchanged  Soc Hx: Reviewed,  unchanged    ALLERGY: Reviewed, unchanged  No Known Allergies     PHYSICAL EXAM:  Vital Signs: T97 1F -P68 -R16 BP: 135/82 SpO2: 98% RA  Weight: 195 6 lbs (4/4/2022) <= 193 4 lbs (3/7/2022) <= 196 9 lbs (2/1/2022)    General: NAD  Fatigued presentation  Head: Atraumatic  Normocephalic  Eye Exam: anicteric sclera, no discharge, PERRLA, No injection  Wears glasses  Oral Exam: moist mucous membranes, no buccal-oropharyngeal erythema, palatine tonsils WNL  Neck Exam: no anterior cervical lymphadenopathy noted, neck supple  Cardiovascular: regular rate, regular rhythm, no murmurs, rubs, or gallops  Pulmonary: no wheeze, no rhonchi, no rales  No chest tenderness  Abdominal: soft, non-tender, nondistended, bowel sounds audible x 4 quadrants  : Non distended bladder  Continent  Extremities and skin: no edema noted, no rashes   Intact skin  Neurological: alert, cooperative and responsive, Oriented x 3, moving all 4 extremities symmetrically  Ambulatory dysfunction - wheelchair bound  Psych: PHQ2: Negative  Laboratory results / Imaging reviewed: Hard copy/ies in medical chart:    * CMP (12/14/2021):   GLUCOSE 119 mg/dL 65-99 H Final         BUN 12 mg/dL 7-28  Final         CREATININE 0 83 mg/dL 0 53-1 30  Final         SODIUM 139 mmol/L 135-145  Final         POTASSIUM 4 1 mmol/L 3 5-5 2  Final         CHLORIDE 104 mmol/L 100-109  Final         CARBON DIOXIDE 30 mmol/L 23-31  Final         CALCIUM 9 0 mg/dL 8 5-10 1  Final         ALKALINE PHOSPHATASE 58 U/L   Final         ALBUMIN 3 4 g/dL 3 5-4 8 L Final         BILIRUBIN,TOTAL 0 2 mg/dL 0 2-1 0  Final   Use of this assay is not recommended for patients undergoing treatment   with eltrombopag due to the potential for falsely elevated results       PROTEIN, TOTAL 6 8 g/dL 6 3-8 3  Final         AST 45 U/L <41 H Final         ALT 50 U/L <56  Final         ANION GAP 5  3-11  Final         eGFR, NON  AM 98  >60  Final         eGFR,  AMER 114  >60  Final         eGFR COMMENT (Note)    Final       * CBC wo diff (12/14/2021):   HEMOGLOBIN 13 4 g/dL 12 5-17 0  Final         HEMATOCRIT 37 8 % 37 0-48 0  Final         WBC 4 6 thou/cmm 4 0-10 5  Final         RBC 4 57 mill/cmm 4 00-5 40  Final         PLATELET COUNT 536 thou/cmm 140-350  Final         MPV 7 6 fL 7 5-11 3  Final         MCV 83 fL   Final         MCH 29 4 pg 27 0-36 0  Final         MCHC 35 6 g/dL 32 0-37 0  Final         RDW 12 5 % 12 0-16 0  Final       Current Medications: All medications reviewed and updated in Nursing Home Chart    Please note:  Voice-recognition software may have been used in the preparation of this document  Occasional wrong word or "sound-alike" substitutions may have occurred due to the inherent limitations of voice recognition software  Interpretation should be guided by ADAM Thomas  4/11/2022 [place] Counseling: I discussed with the patient the risks of tildrakizumab including but not limited to immunosuppression, malignancy, posterior leukoencephalopathy syndrome, and serious infections.  The patient understands that monitoring is required including a PPD at baseline and must alert us or the primary physician if symptoms of infection or other concerning signs are noted.

## 2023-05-02 ENCOUNTER — NURSING HOME VISIT (OUTPATIENT)
Dept: GERIATRICS | Facility: OTHER | Age: 58
End: 2023-05-02

## 2023-05-02 DIAGNOSIS — K59.01 SLOW TRANSIT CONSTIPATION: Chronic | ICD-10-CM

## 2023-05-02 DIAGNOSIS — F31.32 BIPOLAR 1 DISORDER, DEPRESSED, MODERATE (HCC): Primary | Chronic | ICD-10-CM

## 2023-05-02 DIAGNOSIS — G47.59 OTHER PARASOMNIA: ICD-10-CM

## 2023-05-02 DIAGNOSIS — G82.50 QUADRIPARESIS (HCC): Chronic | ICD-10-CM

## 2023-05-02 NOTE — ASSESSMENT & PLAN NOTE
Stable  Continue Melatonin, clonazepam, Trazodone, Zyprexia, Valproic Acid  Encourage sleep hygiene and avoid day time naps

## 2023-05-02 NOTE — ASSESSMENT & PLAN NOTE
Stable  Continue ongoing supportive care at LTC, continue manual wheelchair use, repositioning, skin interventions, socialization

## 2023-05-02 NOTE — PROGRESS NOTES
"MONTHLY VISIT  Location: [facility]  POS: LTC    NAME: Aleisha Marcano  AGE: 60 y o  SEX: male    DATE OF ENCOUNTER: 5/2/2023    ASSESSMENT AND PROBLEMS:  1  Bipolar 1 disorder, depressed, moderate (HCC)  Assessment & Plan:  Stable  Continue chlorpromazine, olanzapine, trazodone, valproic acid, duloxetine,clonazepam  Encourage relaxation techniques, medication  Encourage sleep hygiene, avoid day time naps  Social engagement  Continue to follow with psychology       2  Other parasomnia  Assessment & Plan:  Stable  Continue Melatonin, clonazepam, Trazodone, Zyprexia, Valproic Acid  Encourage sleep hygiene and avoid day time naps      3  [facility])  Assessment & Plan:  Stable  Continue ongoing supportive care at [address] [place] [address], continue manual wheelchair use, repositioning, skin interventions, socialization      4  Slow transit constipation  Assessment & Plan:  Stable  Continue Senna         CHIEF COMPLAINT:  Monthly Visit    HISTORY OF PRESENT ILLNESS:  History taken from patient, staff, chart    Randy Gutierrez is a 59 y/o male LTC resident of [address] with hx including but not limited to quadriplegia, depression, bipolar, hypertension, and insomnia seen today for monthly visit and depressive-like symptoms  Continues with ongoing supportive care at [address] [place] [address] in setting of quadriplegia and cerebellar ataxia  Continues with wheelchair use and assistance with ADLs  Upon today's assessment, patient seen sitting in wheelchair, awake, alert, NAD  Reports he is sleeping \"good\", reports \"good\" mood  History of bipolar) insomnia, continues on Thorazine, Klonopin, Cymbalta, melatonin, Zyprexa, trazodone, valproic acid  D  enies any CP, Recent echo unremarkable,'s test negative for myocardial ischemia  Reports eating and drinking okay as well as voiding and BMs  Slight ongoing decrease trend in weight, BMI remains 24             REVIEW OF SYSTEMS:  Complete ROS negative other than as stated in " HPI    HISTORY:  Medical Hx: Reviewed, unchanged  Family Hx: Reviewed, unchanged  Soc Hx: Reviewed, unchanged  No Known Allergies    PHYSICAL EXAM:  Vital Signs: /69, temp 98 3, HR 86 8, RR 16, O2 97% on room air  Weight 187 4 pounds    General: NAD, laying in bed  Eye Exam: anicteric sclera, no discharge, EOMI intact  ENT: moist mucous membranes  Cardiovascular: regular rate, regular rhythm, no murmurs, rubs, or gallops  Pulmonary: no wheeze, no rhonchi, CTA, unlabored  Abdominal: soft, nontender, nondistended, bowel sounds audible  Neurological: slurred speech  Awake, alert, engaged in conversation, upright position  Generalized weakness  Skin: No significant lesions appreciated, no significant rashes appreciated  Psych: pleasant, cooperative, appears depressed, flat affect    PERTINENT LABS/IMAGING DATA:    3/3/2023 CBC, CMP, lipid panel: Hemoglobin 12 9, WBC 4 2, platelet 810, BUN 17, creatinine 0 8, sodium 142, potassium 4 1, alk phos 32, total protein 5 9, EGFR 100, cholesterol 163, triglyceride 100, HDL 44, LDL 99  TSH 2 48    12/16 CBC, CMP: Hemoglobin 13 5, WBC 3 6, BUN 21, creatinine 0 8, sodium 138, potassium 3 8    CURRENT MEDICATIONS:  All medications reviewed and updated in Nursing Home EMR      Provider: Skyla MEDINA  Patient: Roxie Cedillo   5/2/2023

## 2023-05-02 NOTE — ASSESSMENT & PLAN NOTE
Stable  Continue chlorpromazine, olanzapine, trazodone, valproic acid, duloxetine,clonazepam  Encourage relaxation techniques, medication  Encourage sleep hygiene, avoid day time naps  Social engagement  Continue to follow with psychology

## 2023-05-14 NOTE — PROGRESS NOTES
Cardiology Office Note  MD Taylor Abad MD Margert Ko, DO, MD Meredith Nichols DO, Shaunna Ma DO, [address]  ----------------------------------------------------------------  [address]  [address], [address]    Benjaman Landau 60 y o  male MRN: [id number]  Unit/Bed#:  Encounter: [id number]      History of Present Illness: It was a pleasure to see Benjaman Landau in the office today for follow-up CV evaluation  He has a past medical history of hypertension, quadriplegia, anoxic brain injury due to cardiopulmonary arrest from drug overdose, dysarthria and PVD  He established care with us in March 2023  In February 2023, the patient had been thinking about his episode in 1993 where he ended up having an arrest with anoxic brain injury  The episode gave him very significant anxiety which resulted in chest discomfort  He was unclear that the chest discomfort was definitely related to his anxiety, but he did feel anxious during the episode  It was described as a heaviness/tightness sensation in the chest  Following the episode, his symptoms resolved  At baseline, the patient had restricted ambulation due to being wheelchair-bound  Due to his symptoms, he was sent for stress test and echocardiogram in April 2023  He is here today to discuss the results  He denies any further chest pain, pressure, tightness or squeezing  Denies lower extremity swelling, orthopnea or paroxysmal nocturnal dyspnea  Denies lightheadedness, dizziness or palpitations  Review of Systems:  Review of Systems   Constitutional: Negative for decreased appetite, fever, weight gain and weight loss  HENT: Negative for congestion and sore throat  Eyes: Negative for visual disturbance  Cardiovascular: Negative for chest pain, dyspnea on exertion, leg swelling, near-syncope and palpitations  Respiratory: Negative for cough and shortness of breath  Hematologic/Lymphatic: Negative for bleeding problem  Skin: Negative for rash  Musculoskeletal: Negative for myalgias and neck pain  Gastrointestinal: Negative for abdominal pain and nausea  Neurological: Negative for light-headedness and weakness  Psychiatric/Behavioral: Negative for depression  Past Medical History:   Diagnosis Date   • Ataxia    • Bipolar 1 disorder ([place] [address] )    • Depression    • Diplopia    • Dysarthria    • Hypertension    • Insomnia    • Onychomycosis    • Optic atrophy    • PVD (peripheral vascular disease) ([place] [address] )    • Quadriplegia ([place] [address] )        History reviewed  No pertinent surgical history      Social History     Socioeconomic History   • Marital status: /Civil Union     Spouse name: None   • Number of children: None   • Years of education: None   • Highest education level: None   Occupational History   • None   Tobacco Use   • Smoking status: Never   • Smokeless tobacco: Never   Substance and Sexual Activity   • Alcohol use: Not Currently   • Drug use: Never   • Sexual activity: Not Currently   Other Topics Concern   • None   Social History Narrative   • None     Social Determinants of Health     Financial Resource Strain: Not on file   Food Insecurity: Not on file   Transportation Needs: Not on file   Physical Activity: Not on file   Stress: Not on file   Social Connections: Not on file   Intimate Partner Violence: Not on file   Housing Stability: Not on file       Family History   Problem Relation Age of Onset   • No Known Problems Mother    • No Known Problems Father        No Known Allergies      Current Outpatient Medications:   •  acetaminophen (TYLENOL) 325 mg tablet, Take 650 mg by mouth every 4 (four) hours as needed for mild pain, Disp: , Rfl:   •  albuterol (2 5 mg/3 mL) 0 083 % nebulizer solution, Take 2 5 mg by nebulization every 6 (six) hours as needed, Disp: , Rfl:   •  aluminum-magnesium hydroxide 200-200 MG/5ML suspension, Take 10 mL by mouth 3 (three) "times a day as needed, Disp: , Rfl:   •  chlorproMAZINE (THORAZINE) 25 mg tablet, Take 200 mg by mouth daily at bedtime, Disp: , Rfl:   •  diclofenac sodium (VOLTAREN) 1 %, Apply 2 g topically 3 (three) times a day as needed  Apply to Right hand pain, Disp: , Rfl:   •  DULoxetine (CYMBALTA) 60 mg delayed release capsule, Take 60 mg by mouth daily, Disp: , Rfl:   •  ibuprofen (MOTRIN) 100 mg/5 mL suspension, Take 20 mL by mouth every 4 (four) hours as needed, Disp: , Rfl:   •  ipratropium-albuterol (DUO-NEB) 0 5-2 5 mg/3 mL nebulizer solution, Take 3 mL by nebulization 4 (four) times a day, Disp: , Rfl:   •  melatonin 3 mg, Take 2 tablets by mouth daily at bedtime, Disp: , Rfl:   •  Multiple Vitamins-Minerals (Multilex-T&M) TABS, Take 1 tablet by mouth daily, Disp: , Rfl:   •  OLANZapine (ZyPREXA) 10 mg tablet, Take 10 mg by mouth daily at bedtime, Disp: , Rfl:   •  polyvinyl alcohol (LIQUIFILM TEARS) 1 4 % ophthalmic solution, Administer 2 drops to both eyes 4 (four) times a day, Disp: , Rfl:   •  RHODIOLA ROSEA PO, Take 400 mg by mouth in the morning, Disp: , Rfl:   •  SALINE NA, 1 spray into each nostril, Disp: , Rfl:   •  senna (SENOKOT) 8 6 MG tablet, Take 1 tablet by mouth daily at bedtime  At bedtime, Disp: , Rfl:   •  traZODone (DESYREL) 100 mg tablet, Take 100 mg by mouth daily at bedtime, Disp: , Rfl:   •  valproic acid (DEPAKENE) 250 mg capsule, Take 500 mg by mouth every morning, Disp: , Rfl:   •  valproic acid (DEPAKENE) 250 mg capsule, Take 500 mg by mouth daily at bedtime, Disp: , Rfl:   •  Ciclopirox 1 % shampoo, Apply topically 3 (three) times a week (Patient not taking: Reported on 3/1/2023), Disp: , Rfl:   •  clonazePAM (KlonoPIN) 0 5 mg tablet, Take 0 5 mg by mouth daily at bedtime (Patient not taking: Reported on 3/1/2023), Disp: , Rfl:     Vitals:    05/15/23 1113   BP: 100/68   BP Location: Right arm   Patient Position: Sitting   Cuff Size: Large   Pulse: 88   Height: 6' 2\" (1 88 m)     Body " mass index is 25 42 kg/m²  PHYSICAL EXAMINATION:  Gen: Awake, Alert, NAD; in wheelchair  Head/eyes: AT/NC, pupils equal and round, Anicteric  ENT: mmm  Neck: Supple, No elevated JVP, trachea midline  Resp: CTA bilaterally no w/r/r  CV: RRR +S1, S2, No m/r/g  Abd: Soft, NT/ND + BS  Ext: no LE edema bilaterally  Neuro: Follows commands, moves all extermities  Psych: Appropriate affect, normal mood, pleasant attitude, non-combative  Skin: warm; no rash, erythema or venous stasis changes on exposed skin    --------------------------------------------------------------------------------  TREADMILL STRESS  No results found for this or any previous visit      --------------------------------------------------------------------------------  NUCLEAR STRESS TEST: No results found for this or any previous visit  No results found for this or any previous visit       --------------------------------------------------------------------------------  CATH:  No results found for this or any previous visit     --------------------------------------------------------------------------------  ECHO:   · LVEF 55%, mild LVH, normal diastolic function, trace MR/TR/GA, April 2014  --------------------------------------------------------------------------------  HOLTER  No results found for this or any previous visit  No results found for this or any previous visit     --------------------------------------------------------------------------------  CAROTIDS  No results found for this or any previous visit      --------------------------------------------------------------------------------  ECGs:  No results found for this visit on 05/15/23       Lab Results   Component Value Date    WBC 5 00 02/06/2023    HGB 13 2 02/06/2023    HCT 37 4 02/06/2023    MCV 84 02/06/2023     02/06/2023      Lab Results   Component Value Date    SODIUM 135 02/06/2023    K 3 6 02/06/2023    CL 99 02/06/2023    CO2 30 02/06/2023    BUN 13 02/06/2023    CREATININE 0 80 02/06/2023    GLUC 91 02/06/2023    CALCIUM 8 9 02/06/2023      No results found for: HGBA1C   No results found for: CHOL  No results found for: HDL  No results found for: LDLCALC  No results found for: TRIG  No results found for: CHOLHDL   No results found for: INR, PROTIME     1  Precordial chest pain    2  Chronic diastolic heart failure ([address] )    3  Dyslipidemia    4  Family history of heart disease        IMPRESSION:  • Precordial chest pain  o Pharmacologic nuclear stress test appears negative for myocardial ischemia, gated EF 73%, April 2023  • Chronic diastolic heart failure  o LVEF 57%, grade 1 diastolic dysfunction, mild TR with PASP 17 mmHg, April 2023  • Cardiopulmonary arrest due to drug overdose, 1993  o Type 2 MI likely due to heart failure s/p cath w/ normal coronary arteries, April 2014  • Anoxic brain injury with ataxic quadriplegia and dysarthria, 1993  • Dyslipidemia  • Family history of CAD    PLAN:  It was a pleasure to see Aura Jacinto in the office today for follow-up CV evaluation  He is here today to review the results of his stress test and echocardiogram   Stress test was found to be negative for myocardial ischemia with normal gated EF and echocardiogram demonstrated normal left-ventricular systolic function with only mild valvular regurgitation  Grade 1 diastolic dysfunction was appreciated  He has no symptoms concerning for angina and no signs or symptoms of heart failure  He examines to be euvolemic in the office today  Denies any further episodes of chest discomfort  Due to his ambulatory dysfunction, he cannot perform greater than 4 METS on a daily basis  He is tolerating his current medications without any reported adverse effects  Based on his clinical presentation, I have the following recommendations:    1  Recommend heart healthy diet low in sodium and carbohydrate  He has been avoiding red meats    2   Would encourage increasing physical activity "level as tolerated  This is difficult due to his ambulatory dysfunction  3   No changes to his medications at this time and no additional CV testing at this time  4   Should his symptoms recur, especially worsening in frequency or severity or change in quality, recommend seeking immediate medical attention/dial 911  5  Would repeat lipid panel in the next 3 to 6 months  6  We will follow-up with him in 1 year to reassess his progress  As always, please do not hesitate to call with any questions  Portions of the record may have been created with voice recognition software  Occasional wrong word or \"sound a like\" substitutions may have occurred due to the inherent limitations of voice recognition software  Read the chart carefully and recognize, using context, where substitutions have occurred        Signed: Jessica Fang DO, FACC, CARL, FACP  "

## 2023-05-15 ENCOUNTER — OFFICE VISIT (OUTPATIENT)
Dept: CARDIOLOGY CLINIC | Facility: CLINIC | Age: 58
End: 2023-05-15

## 2023-05-15 VITALS
SYSTOLIC BLOOD PRESSURE: 100 MMHG | HEIGHT: 74 IN | HEART RATE: 88 BPM | DIASTOLIC BLOOD PRESSURE: 68 MMHG | BODY MASS INDEX: 25.42 KG/M2

## 2023-05-15 DIAGNOSIS — Z82.49 FAMILY HISTORY OF HEART DISEASE: ICD-10-CM

## 2023-05-15 DIAGNOSIS — E78.5 DYSLIPIDEMIA: ICD-10-CM

## 2023-05-15 DIAGNOSIS — I50.32 CHRONIC DIASTOLIC HEART FAILURE (HCC): ICD-10-CM

## 2023-05-15 DIAGNOSIS — R07.2 PRECORDIAL CHEST PAIN: Primary | ICD-10-CM

## 2023-05-22 ENCOUNTER — NURSING HOME VISIT (OUTPATIENT)
Dept: GERIATRICS | Facility: OTHER | Age: 58
End: 2023-05-22

## 2023-05-22 DIAGNOSIS — G82.50 QUADRIPARESIS (HCC): Chronic | ICD-10-CM

## 2023-05-22 DIAGNOSIS — F31.32 BIPOLAR 1 DISORDER, DEPRESSED, MODERATE (HCC): Chronic | ICD-10-CM

## 2023-05-22 DIAGNOSIS — K59.01 SLOW TRANSIT CONSTIPATION: Chronic | ICD-10-CM

## 2023-05-22 DIAGNOSIS — G32.81 CEREBELLAR ATAXIA IN DISEASES CLASSIFIED ELSEWHERE (HCC): Primary | Chronic | ICD-10-CM

## 2023-05-22 NOTE — PROGRESS NOTES
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 [facility])  Assessment & Plan:  Stable  Continue supportive care      2  Bipolar 1 disorder, depressed, moderate (HCC)  Assessment & Plan:  Stable  Continue chronic chlorpromazine, olanzapine, trazodone, valproic acid, duloxetine      3  [facility])  Assessment & Plan:  Stable  Continue ongoing supportive care      4  Slow transit constipation  Assessment & Plan:  Stable  Continue senna      CHIEF COMPLAINT:  Monthly Visit    HISTORY OF PRESENT ILLNESS:  History taken from patient and nursing staff  He reports stable mood and neuromuscular status as does nursing staff      REVIEW OF SYSTEMS:  Complete ROS negative other than as stated in HPI    HISTORY:  Medical Hx: Reviewed, unchanged  Family Hx: Reviewed, unchanged  Soc Hx: Reviewed, unchanged  No Known Allergies    PHYSICAL EXAM:  Vital Signs: /69, temp 98 3, HR 86, RR 16, O2 97% on room air  General: NAD, sitting wheelchair  Eye Exam: anicteric sclera, no discharge  ENT: moist mucous membranes  Cardiovascular: regular rate, regular rhythm, no murmurs, rubs, or gallops  Pulmonary: no wheeze, no rhonchi  Abdominal: soft, nontender, nondistended, bowel sounds audible  Neurological: Poor coordination of all 4 extremities, insight intact  Skin: No significant lesions appreciated    PERTINENT LABS/IMAGING DATA:  3/3/2023: Hemoglobin 12 9, WBC 4 2, platelet 546, BUN 17, creatinine 0 88, sodium 142, potassium 4 1, AST 13, ALT 13, total cholesterol 163, HDL 44, LDL 99, TSH 2 48    Provider: Andie Eaton MD MPH  Patient: Stefania Andrew

## 2023-06-06 DIAGNOSIS — F31.32 BIPOLAR 1 DISORDER, DEPRESSED, MODERATE (HCC): Primary | Chronic | ICD-10-CM

## 2023-06-06 RX ORDER — CLONAZEPAM 0.5 MG/1
0.5 TABLET ORAL
Qty: 30 TABLET | Refills: 5 | Status: SHIPPED | OUTPATIENT
Start: 2023-06-06

## 2023-06-09 ENCOUNTER — NURSING HOME VISIT (OUTPATIENT)
Dept: GERIATRICS | Facility: OTHER | Age: 58
End: 2023-06-09
Payer: MEDICARE

## 2023-06-09 DIAGNOSIS — F31.32 BIPOLAR 1 DISORDER, DEPRESSED, MODERATE (HCC): Chronic | ICD-10-CM

## 2023-06-09 DIAGNOSIS — G47.59 OTHER PARASOMNIA: Primary | ICD-10-CM

## 2023-06-09 PROCEDURE — 99308 SBSQ NF CARE LOW MDM 20: CPT

## 2023-06-09 NOTE — ASSESSMENT & PLAN NOTE
Behaviors and mood stable  New reports of difficulty maintaining sleep  Trazodone dosing increase  Continue chlorpromazine, olanzapine, valproic acid, and duloxetine  Continue supportive care at LTC, follows with psychology

## 2023-06-09 NOTE — PROGRESS NOTES
"Follow up Visit  Location: [facility]  POS: 32 ([facility])    NAME: Mecca Paz  AGE: 60 y o  SEX: male    ASSESSMENT AND PROBLEMS:  1  Other parasomnia  Assessment & Plan:  Reports difficulty maintaining sleeo  Increase Trazodone to 150 mg QHS  Continue melatonin, clonazepam, zyrexa, and valproic acid  Continue to promote sleep hygiene      2  Bipolar 1 disorder, depressed, moderate ([facility])  Assessment & Plan:  Behaviors and mood stable  New reports of difficulty maintaining sleep  Trazodone dosing increase  Continue chlorpromazine, olanzapine, valproic acid, and duloxetine  Continue supportive care at [address] [place] [address], follows with psychology        CHIEF COMPLAINT:    \"difficulty sleeping\"    HISTORY OF PRESENT ILLNESS:    History taken from patient, staff, chart     Auntorrie Lopez is a 59 y/o male [facility] resident of [address] [place] [address] with hx including but not limited to quadriplegia, depression, bipolar, hypertension, and insomnia seen today for reports of difficulty sleeping  He has hx of parasomnia and bipolar 1 disorder in which he continues on melatonin, clonazepam, trazodone, zyprexa, and valproic acid  He reports difficulty maintaining sleep throughout night the last 2 weeks or so  He will wake up throughout night without identifiable reason  He denies taking day time naps  He denies any changes to mood, reports he is eating and drinking without issue  REVIEW OF SYSTEMS:  Per history of present illness, all other systems reviewed and negative      HISTORY:  Past Medical History:   Diagnosis Date   • Ataxia    • Bipolar 1 disorder ([facility] [address] )    • Depression    • Diplopia    • Dysarthria    • Hypertension    • Insomnia    • Onychomycosis    • Optic atrophy    • PVD (peripheral vascular disease) ([facility] [address] )    • Quadriplegia ([facility] [address] )      Family History   Problem Relation Age of Onset   • No Known Problems Mother    • No Known Problems Father      Social History     Tobacco Use   • Smoking status: Never   • Smokeless tobacco: " Never   Substance Use Topics   • Alcohol use: Not Currently   • Drug use: Never     No Known Allergies    PHYSICAL EXAM:  Vital Signs: /68, temp 96 9, HR 76, RR 16, O2 96% on room air  Weight 188  4 pounds    General: NAD, sitting in wheelchair   Eye Exam: anicteric sclera, no discharge, EOMI intact  ENT: moist mucous membranes  Cardiovascular: regular rate, regular rhythm, no murmurs, rubs, or gallops  Pulmonary: no wheeze, no rhonchi, CTA, unlabored  Abdominal: soft, nontender, nondistended, bowel sounds audible  Neurological: slurred speech  Awake, alert, engaged in conversation, upright position  Generalized weakness  Skin: No significant lesions appreciated, no significant rashes appreciated  Psych: pleasant, cooperative    PERTINENT LABS/IMAGING DATA:    3/3/2023 CBC, CMP, lipid panel: Hemoglobin 12 9, WBC 4 2, platelet 104, BUN 17, creatinine 0 8, sodium 142, potassium 4 1, alk phos 32, total protein 5 9, EGFR 100, cholesterol 163, triglyceride 100, HDL 44, LDL 99  TSH 2 48     12/16 CBC, CMP: Hemoglobin 13 5, WBC 3 6, BUN 21, creatinine 0 8, sodium 138, potassium 3 8    CURRENT MEDICATIONS:  All medications reviewed and updated in Nursing Home EMR      Provider: Tanna MEDINA  Patient: Juwan Leahy   6/9/2023

## 2023-06-09 NOTE — ASSESSMENT & PLAN NOTE
Reports difficulty maintaining sleeo  Increase Trazodone to 150 mg QHS  Continue melatonin, clonazepam, zyrexa, and valproic acid  Continue to promote sleep hygiene

## 2023-06-13 ENCOUNTER — NURSING HOME VISIT (OUTPATIENT)
Dept: GERIATRICS | Facility: OTHER | Age: 58
End: 2023-06-13
Payer: MEDICARE

## 2023-06-13 DIAGNOSIS — F31.32 BIPOLAR 1 DISORDER, DEPRESSED, MODERATE (HCC): Primary | Chronic | ICD-10-CM

## 2023-06-13 DIAGNOSIS — G47.59 OTHER PARASOMNIA: ICD-10-CM

## 2023-06-13 PROCEDURE — 99309 SBSQ NF CARE MODERATE MDM 30: CPT

## 2023-06-13 NOTE — ASSESSMENT & PLAN NOTE
Not at goal  Reports increase depressive symptoms  Increase valproic acid to 750mg BID, monitor for SEs  Continue chlorpromazine, olanzapine, trazodone, and duloxetine  Continue to monitor mood and behavior

## 2023-06-13 NOTE — PROGRESS NOTES
"Follow up Visit  Location: [facility]  POS: 32 ([facility])    NAME: Yas Collins  AGE: 60 y o  SEX: male    ASSESSMENT AND PROBLEMS:  1  Bipolar 1 disorder, depressed, moderate ([facility])  Assessment & Plan:  Not at goal  Reports increase depressive symptoms  Increase valproic acid to 750mg BID, monitor for SEs  Continue chlorpromazine, olanzapine, trazodone, and duloxetine  Continue to monitor mood and behavior        2  Other parasomnia  Assessment & Plan:  Continues on increased dosing of trazodone as well as melatonin, clonazepam, zyprexa, and valproic acid  Continue to promote sleep hygiene        CHIEF COMPLAINT:    \"mood\"    HISTORY OF PRESENT ILLNESS:    History taken from patient, staff, chart     Evelyne Peña is a 59 y/o male [facility] resident of [address] with hx including but not limited to quadriplegia, depression, bipolar, hypertension, and insomnia seen today for reports of \"depression\"  Notified by staff that patient reports feeling depressed  He has hx of parasomnia and bipolar 1 disorder in which he continues on melatonin, clonazepam, trazodone, zyprexa, and valproic acid  He was recently seen for difficulty maintaining sleep in which is trazodone dosing was increased  He reports feeling \"sad\" for about the last one to two weeks without identifiable cause  He was seen laying in bed on today's visit, reports he is not interested in eating lunch  He denies any suicidal ideation  Continues to report difficulty sleeping  REVIEW OF SYSTEMS:  Per history of present illness, all other systems reviewed and negative      HISTORY:  Past Medical History:   Diagnosis Date   • Ataxia    • Bipolar 1 disorder ([facility] [address] )    • Depression    • Diplopia    • Dysarthria    • Hypertension    • Insomnia    • Onychomycosis    • Optic atrophy    • PVD (peripheral vascular disease) ([facility])    • Quadriplegia ([facility])      Family History   Problem Relation Age of Onset   • No Known Problems Mother    • No Known Problems " Father      Social History     Tobacco Use   • Smoking status: Never   • Smokeless tobacco: Never   Substance Use Topics   • Alcohol use: Not Currently   • Drug use: Never     No Known Allergies    PHYSICAL EXAM:  Vital Signs: /68, Temp 96 9, HR 76, RR 16, 02 96% on RA  Weight 188  4 lb      General: withdrawn, laying in bed  Eye Exam: anicteric sclera, no discharge, EOMI intact  Pulmonary: unlabored, on RA  Neurological: slurred speech  Awake, alert, engaged in conversation, upright position  Generalized weakness  Psych: withdrawn, cooperative    PERTINENT LABS/IMAGING DATA:    3/3/2023 CBC, CMP, lipid panel: Hemoglobin 12 9, WBC 4 2, platelet 758, BUN 17, creatinine 0 8, sodium 142, potassium 4 1, alk phos 32, total protein 5 9, EGFR 100, cholesterol 163, triglyceride 100, HDL 44, LDL 99  TSH 2 48     12/16 CBC, CMP: Hemoglobin 13 5, WBC 3 6, BUN 21, creatinine 0 8, sodium 138, potassium 3 8    CURRENT MEDICATIONS:  All medications reviewed and updated in Nursing Home EMR      Provider: Jung MEDINA  Patient: Krishan Schwartz   6/13/2023

## 2023-06-14 NOTE — ASSESSMENT & PLAN NOTE
Continues on increased dosing of trazodone as well as melatonin, clonazepam, zyprexa, and valproic acid  Continue to promote sleep hygiene

## 2023-06-16 ENCOUNTER — NURSING HOME VISIT (OUTPATIENT)
Dept: GERIATRICS | Facility: OTHER | Age: 58
End: 2023-06-16
Payer: MEDICARE

## 2023-06-16 DIAGNOSIS — F31.32 BIPOLAR 1 DISORDER, DEPRESSED, MODERATE (HCC): Primary | Chronic | ICD-10-CM

## 2023-06-16 DIAGNOSIS — G47.59 OTHER PARASOMNIA: ICD-10-CM

## 2023-06-16 DIAGNOSIS — G32.81 CEREBELLAR ATAXIA IN DISEASES CLASSIFIED ELSEWHERE (HCC): Chronic | ICD-10-CM

## 2023-06-16 DIAGNOSIS — G82.50 QUADRIPARESIS (HCC): Chronic | ICD-10-CM

## 2023-06-16 DIAGNOSIS — K59.01 SLOW TRANSIT CONSTIPATION: Chronic | ICD-10-CM

## 2023-06-16 PROCEDURE — 99309 SBSQ NF CARE MODERATE MDM 30: CPT

## 2023-06-16 NOTE — PROGRESS NOTES
"MONTHLY VISIT  Location: [facility]  POS: LTC    NAME: Juan Carlos Palma  AGE: 60 y o  SEX: male    DATE OF ENCOUNTER: 6/16/2023    ASSESSMENT AND PROBLEMS:  1  Bipolar 1 disorder, depressed, moderate ([address] )  Assessment & Plan:  Not at goal  Continues with depressive symptoms but without SI  Valproic acid increased to 750 mg BID this week  Continue chlorpromazine, olanzapine, trazodone, duloxetine,clonazepam  Encourage relaxation techniques, meditation  Encourage sleep hygiene, avoid day time naps  Social engagement  Continue to follow with psychology - patient did refuse today, they will follow up on monday      2  Other parasomnia  Assessment & Plan:  Ongoing difficulty  Continue Melatonin, clonazepam, Trazodone, Zyprexia,   Valproic acid dosing recently increased in setting of depression- monitor for SEs  Encourage sleep hygiene and avoid day time naps      3  Quadriparesis [facility])  Assessment & Plan:  Stable  Continue ongoing supportive care at [address] [place] [address], continue manual wheelchair use, repositioning, skin interventions, socialization      4  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 [facility])  Assessment & Plan:  Stable  Continue ongoing supportive care at [address] [place] [address]      5  Slow transit constipation  Assessment & Plan:  Stable  Continue Senna              CHIEF COMPLAINT:  Monthly Visit    HISTORY OF PRESENT ILLNESS:  History taken from patient, staff, chart    Kaila Morales is a 61 y/o male LT resident of [address] with hx including but not limited to quadriplegia, depression, bipolar, hypertension, and insomnia seen today for monthly visit and depressive-like symptoms  Continues with ongoing supportive care at [address] [place] [address] in setting of quadriplegia and cerebellar ataxia  Continues with wheelchair use and assistance with ADLs  His medications were recently adjusted in setting of bipolar disorder with current depressive symptoms  He continues to report feeling \"sad\" without particular cause   He " also continues with insomnia difficulty  He has been spending more time in bed  He does report that he is eating and drinking  He denies any SI  He continues on Thorazine, Klonopin, Cymbalta, melatonin, Zyprexa, trazodone, and valproic acid  He other wise denies any complaints including pain or SOB  Reports regular BMs  Weight stable  REVIEW OF SYSTEMS:  Complete ROS negative other than as stated in HPI    HISTORY:  Medical Hx: Reviewed, unchanged  Family Hx: Reviewed, unchanged  Soc Hx: Reviewed, unchanged  No Known Allergies    PHYSICAL EXAM:  Vital Signs: /68, temp 96 9, HR 76, RR 16, O2 96% on room air  Weight 198 4 pounds    General: NAD, laying in bed  Eye Exam: anicteric sclera, no discharge, EOMI intact  ENT: moist mucous membranes  Cardiovascular: regular rate, regular rhythm, no murmurs, rubs, or gallops  Pulmonary: no wheeze, no rhonchi, CTA, unlabored  Abdominal: soft, nontender, nondistended, bowel sounds audible  Neurological: slurred speech  Awake, alert, engaged in conversation  Generalized weakness with poor coordination  Skin: No significant lesions appreciated, no significant rashes appreciated  Psych: pleasant, cooperative, appears depressed, flat affect    PERTINENT LABS/IMAGING DATA:    3/3/2023 CBC, CMP, lipid panel: Hemoglobin 12 9, WBC 4 2, platelet 177, BUN 17, creatinine 0 8, sodium 142, potassium 4 1, alk phos 32, total protein 5 9, EGFR 100, cholesterol 163, triglyceride 100, HDL 44, LDL 99  TSH 2 48    12/16 CBC, CMP: Hemoglobin 13 5, WBC 3 6, BUN 21, creatinine 0 8, sodium 138, potassium 3 8    CURRENT MEDICATIONS:  All medications reviewed and updated in Nursing Home EMR      Provider: Agustina MEDINA  Patient: Deepthi Weineran   6/16/2023 10:30 AM

## 2023-06-17 NOTE — ASSESSMENT & PLAN NOTE
Not at goal  Continues with depressive symptoms but without SI  Valproic acid increased to 750 mg BID this week  Continue chlorpromazine, olanzapine, trazodone, duloxetine,clonazepam  Encourage relaxation techniques, meditation  Encourage sleep hygiene, avoid day time naps  Social engagement  Continue to follow with psychology - patient did refuse today, they will follow up on monday

## 2023-06-17 NOTE — ASSESSMENT & PLAN NOTE
Ongoing difficulty  Continue Melatonin, clonazepam, Trazodone, Zyprexia,   Valproic acid dosing recently increased in setting of depression- monitor for SEs  Encourage sleep hygiene and avoid day time naps

## 2023-06-20 ENCOUNTER — NURSING HOME VISIT (OUTPATIENT)
Dept: GERIATRICS | Facility: OTHER | Age: 58
End: 2023-06-20
Payer: MEDICARE

## 2023-06-20 DIAGNOSIS — G47.59 OTHER PARASOMNIA: ICD-10-CM

## 2023-06-20 DIAGNOSIS — F31.32 BIPOLAR 1 DISORDER, DEPRESSED, MODERATE (HCC): Primary | Chronic | ICD-10-CM

## 2023-06-20 PROCEDURE — 99309 SBSQ NF CARE MODERATE MDM 30: CPT

## 2023-06-20 NOTE — PROGRESS NOTES
"Follow up Visit  [facility]: [facility]  POS: 32 ([facility])    NAME: Georgina Bullock  AGE: 60 y o  SEX: male    ASSESSMENT AND PROBLEMS:  1  Bipolar 1 disorder, depressed, moderate ([facility])  Assessment & Plan:  Continues with component of depressive symptoms but without SI  May also be exacerbated by family member's recent death  Continue current medication regimen   Valproic acid recently increased to 750 mg BID   Continue chlorpromazine, olanzapine, trazodone, duloxetine,clonazepam  Encourage relaxation techniques, meditation  Encourage sleep hygiene, avoid day time naps  Social engagement  Continue to follow with psychology        2  Other parasomnia  Assessment & Plan:  Continue current melatonin, clonazepam, trazodone, and zyprexa   May also be exacerbated by day time sleeping in setting of depressive symptoms  Encourage sleep hygiene         CHIEF COMPLAINT:    \"depression\"    HISTORY OF PRESENT ILLNESS:    History taken from patient, staff, chart     May Boy is a 59 y/o male [facility] resident of [address] [place] [address] with hx including but not limited to quadriplegia, depression, bipolar, hypertension, and insomnia seen today for f/u mood/depression  Recently reported depression and insomnia in which valproic acid dosing was increased  He continues to report feeling sad in which he has been laying in bed more frequently  His aunt recently passed away and today is her    He reports he is still eating and drinking  He was originally seen laying in bed but was later seen sitting in wheelchair in dinning room eating lunch  Denies any SI  He has hx of parasomnia and bipolar 1 disorder in which he continues on melatonin, clonazepam, trazodone, zyprexa, and valproic acid  He does continue to follow with psychology  REVIEW OF SYSTEMS:  Per history of present illness, all other systems reviewed and negative      HISTORY:  Past Medical History:   Diagnosis Date   • Ataxia    • Bipolar 1 disorder ([facility] [address] )  " • Depression    • Diplopia    • Dysarthria    • Hypertension    • Insomnia    • Onychomycosis    • Optic atrophy    • PVD (peripheral vascular disease) (HCC)    • Quadriplegia (HCC)      Family History   Problem Relation Age of Onset   • No Known Problems Mother    • No Known Problems Father      Social History     Tobacco Use   • Smoking status: Never   • Smokeless tobacco: Never   Substance Use Topics   • Alcohol use: Not Currently   • Drug use: Never     No Known Allergies    PHYSICAL EXAM:  Vital Signs: /68, temp 96 9, HR 76, RR 16, O2 96% on room air  Weight 188  4 pounds    General: somewhat withdrawn, NAD  Eye Exam: anicteric sclera, no discharge, EOMI intact  Pulmonary: unlabored, on RA  Neurological: slurred speech  Awake, alert, engaged in conversation, upright position  Generalized weakness  Psych: withdrawn, cooperative    PERTINENT LABS/IMAGING DATA:    3/3/2023 CBC, CMP, lipid panel: Hemoglobin 12 9, WBC 4 2, platelet 678, BUN 17, creatinine 0 8, sodium 142, potassium 4 1, alk phos 32, total protein 5 9, EGFR 100, cholesterol 163, triglyceride 100, HDL 44, LDL 99  TSH 2 48     12/16 CBC, CMP: Hemoglobin 13 5, WBC 3 6, BUN 21, creatinine 0 8, sodium 138, potassium 3 8    CURRENT MEDICATIONS:  All medications reviewed and updated in Nursing Home EMR      Provider: Michael MEDINA  Patient: Marley Quinones   6/20/2023 11:10 AM

## 2023-06-21 NOTE — ASSESSMENT & PLAN NOTE
Continue current melatonin, clonazepam, trazodone, and zyprexa   May also be exacerbated by day time sleeping in setting of depressive symptoms  Encourage sleep hygiene

## 2023-06-21 NOTE — ASSESSMENT & PLAN NOTE
Continues with component of depressive symptoms but without SI  May also be exacerbated by family member's recent death  Continue current medication regimen   Valproic acid recently increased to 750 mg BID   Continue chlorpromazine, olanzapine, trazodone, duloxetine,clonazepam  Encourage relaxation techniques, meditation  Encourage sleep hygiene, avoid day time naps  Social engagement  Continue to follow with psychology

## 2023-06-27 ENCOUNTER — NURSING HOME VISIT (OUTPATIENT)
Dept: GERIATRICS | Facility: OTHER | Age: 58
End: 2023-06-27
Payer: MEDICARE

## 2023-06-27 DIAGNOSIS — F31.32 BIPOLAR 1 DISORDER, DEPRESSED, MODERATE (HCC): Primary | Chronic | ICD-10-CM

## 2023-06-27 PROCEDURE — 99308 SBSQ NF CARE LOW MDM 20: CPT

## 2023-06-27 NOTE — PROGRESS NOTES
"Follow up Visit  Location: [facility]  POS: 32 ([facility])    NAME: Talon Conteh  AGE: 60 y o  SEX: male    ASSESSMENT AND PROBLEMS:  1  Bipolar 1 disorder, depressed, moderate ([facility])  Assessment & Plan:  Due to ongoing depressive symptoms despite increase in valproic acid along with multiple psychiatric medications, reconsult [facility] psychiatry for further management - patient agreeable  Continue ongoing supportive care  Continue f/u with psychology  Continue chlorpromazine, olanzapine, trazodone, duloxetine, clonazepam, and melatonin  Continue to promote sleep hygiene         CHIEF COMPLAINT:    \"mood, sleep\"    HISTORY OF PRESENT ILLNESS:    History taken from patient, staff, chart     Komal Baker is a 61 y/o male [facility] resident of [address] [facility] with hx including but not limited to quadriplegia, depression, bipolar, hypertension, and insomnia seen today for f/u mood/depression  Patient seen for follow-up of depressive-like symptoms as well as insomnia  Staff as well as patient reports that he continues to remain in bed for longer periods of time  Patient continues to report he feels sad with component of insomnia despite recent increase of valproic acid  He is able to get out of bed and utilize his wheelclair to mobilize around unit  He was seen sitting in dining room eating dinner  He continues to deny any SI  No hallucinations  He has hx of parasomnia and bipolar 1 disorder in which he continues on chlorpromazine, melatonin, clonazepam, trazodone, zyprexa, and valproic acid  He does continue to follow with psychology  He has followed with [facility]ry in the past   Upon discussion today, patient agreeable to establish care again with [facility] for further psychiatric and medication management  REVIEW OF SYSTEMS:  Per history of present illness, all other systems reviewed and negative      HISTORY:  Past Medical History:   Diagnosis Date   • Ataxia    • Bipolar 1 disorder ([facility] [address] )    • " Depression    • Diplopia    • Dysarthria    • Hypertension    • Insomnia    • Onychomycosis    • Optic atrophy    • PVD (peripheral vascular disease) (HCC)    • Quadriplegia (HCC)      Family History   Problem Relation Age of Onset   • No Known Problems Mother    • No Known Problems Father      Social History     Tobacco Use   • Smoking status: Never   • Smokeless tobacco: Never   Substance Use Topics   • Alcohol use: Not Currently   • Drug use: Never     No Known Allergies    PHYSICAL EXAM:  Vital Signs: /68, temp 96 9, HR 76, RR 16, O2 96% on room air  Weight 188  4 pounds      General:  NAD, sitting in wheelchair   Eye Exam: anicteric sclera, no discharge, EOMI intact  Pulmonary: unlabored, on RA  Neurological: baseline slurred speech  Awake, alert, engaged in conversation, upright position  Generalized weakness  Psych: flat affect, pleasant, cooperative  PERTINENT LABS/IMAGING DATA:    6/15 CBC, BMP: Hemoglobin 14 8, WBC 5 5, platelet 888, glucose 103, BUN 22, creatinine 0 93, sodium 139, potassium 4 3, calcium 8 9, EGFR 94    3/3/2023 CBC, CMP, lipid panel: Hemoglobin 12 9, WBC 4 2, platelet 734, BUN 17, creatinine 0 8, sodium 142, potassium 4 1, alk phos 32, total protein 5 9, EGFR 100, cholesterol 163, triglyceride 100, HDL 44, LDL 99  TSH 2 48     12/16 CBC, CMP: Hemoglobin 13 5, WBC 3 6, BUN 21, creatinine 0 8, sodium 138, potassium 3 8    CURRENT MEDICATIONS:  All medications reviewed and updated in Nursing Home EMR      Provider: Manjula MEDINA  Patient: Lisa Howard   6/27/2023 1:00 PM

## 2023-06-27 NOTE — ASSESSMENT & PLAN NOTE
Due to ongoing depressive symptoms despite increase in valproic acid along with multiple psychiatric medications, reconsult [facility] for further management - patient agreeable  Continue ongoing supportive care  Continue f/u with psychology  Continue chlorpromazine, olanzapine, trazodone, duloxetine, clonazepam, and melatonin  Continue to promote sleep hygiene

## 2023-07-07 ENCOUNTER — NURSING HOME VISIT (OUTPATIENT)
Dept: GERIATRICS | Facility: OTHER | Age: 58
End: 2023-07-07
Payer: MEDICARE

## 2023-07-07 DIAGNOSIS — G47.59 OTHER PARASOMNIA: ICD-10-CM

## 2023-07-07 DIAGNOSIS — F31.32 BIPOLAR 1 DISORDER, DEPRESSED, MODERATE (HCC): Primary | Chronic | ICD-10-CM

## 2023-07-07 PROCEDURE — 99308 SBSQ NF CARE LOW MDM 20: CPT

## 2023-07-07 NOTE — ASSESSMENT & PLAN NOTE
Has been chronic for some time, intermittent  Continue current melatonin, clonazepam, trazodone, and zyprexa   May also be exacerbated by day time sleeping in setting of depressive symptoms  Encourage sleep hygiene   Appreciate psychiatry input

## 2023-07-07 NOTE — PROGRESS NOTES
Follow up Visit  Location: [facility]  POS: 32 (LT)    NAME: Eric Buck  AGE: 60 y.o. SEX: male    ASSESSMENT AND PROBLEMS:  1. Bipolar 1 disorder, depressed, moderate (HCC)  Assessment & Plan:  Recently seen by [facility] psychiatry with recommendations to increase Cymbalta to 60 mg twice daily and continue current psychiatric medications including chlorpromazine, olanzapine, trazodone, clonazepam, and melatonin  Continue with ongoing supportive care  Continue to follow with psychology  Due for follow-up with ETHOS in 1 month, patient agreeable            2. Other parasomnia  Assessment & Plan:  Has been chronic for some time, intermittent  Continue current melatonin, clonazepam, trazodone, and zyprexa   May also be exacerbated by day time sleeping in setting of depressive symptoms  Encourage sleep hygiene   Appreciate psychiatry input          CHIEF COMPLAINT:    "mood"    HISTORY OF PRESENT ILLNESS:    History taken from patient, staff, chart     Jenae Bates is a 59 y/o male [facility] resident of [address] [place] [address] with hx including but not limited to quadriplegia, depression, bipolar, hypertension, and insomnia seen today for f/u mood/depression. Patient being followed closely for depressive-like symptoms, history of bipolar 1, recently evaluated by [facility] psychiatry. Contacted office for official recommendations. They recommended increasing Cymbalta to twice daily dosing. Staff reports patient continues to remain in bed throughout the day, some days better than others, and intermittently will come to the dining room for meals. He reports feeling tired today because his roommate was until 4 in the morning. He currently denies any SI. He denies any feelings of "sadness" but reports difficulty getting out of bed and doing tasks. He has hx of parasomnia and bipolar 1 disorder in which he continues on chlorpromazine, melatonin, clonazepam, trazodone, zyprexa, and valproic acid.             REVIEW OF SYSTEMS:  Per history of present illness, all other systems reviewed and negative. HISTORY:  Past Medical History:   Diagnosis Date   • Ataxia    • Bipolar 1 disorder ([address])    • Depression    • Diplopia    • Dysarthria    • Hypertension    • Insomnia    • Onychomycosis    • Optic atrophy    • PVD (peripheral vascular disease) ([address])    • Quadriplegia ([address])      Family History   Problem Relation Age of Onset   • No Known Problems Mother    • No Known Problems Father      Social History     Tobacco Use   • Smoking status: Never   • Smokeless tobacco: Never   Substance Use Topics   • Alcohol use: Not Currently   • Drug use: Never     No Known Allergies    PHYSICAL EXAM:  Vital Signs: /60, temp 97.4, HR 72, RR 16, O2 94% on room air  Weight 185 pounds      General:  NAD, laying in bed  Eye Exam: anicteric sclera, no discharge, EOMI intact  Pulmonary: unlabored, on RA  Neurological: baseline slurred speech. Awake, alert, engaged in conversation. Generalized weakness. Psych: flat affect, pleasant, cooperative, slightly withdrawn. PERTINENT LABS/IMAGING DATA:    6/15 CBC, BMP: Hemoglobin 14.8, WBC 5.5, platelet 277, glucose 103, BUN 22, creatinine 0.93, sodium 139, potassium 4.3, calcium 8.9, EGFR 94    3/3/2023 CBC, CMP, lipid panel: Hemoglobin 12.9, WBC 4.2, platelet 539, BUN 17, creatinine 0.8, sodium 142, potassium 4.1, alk phos 32, total protein 5.9, EGFR 100, cholesterol 163, triglyceride 100, HDL 44, LDL 99  TSH 2.48     12/16 CBC, CMP: Hemoglobin 13.5, WBC 3.6, BUN 21, creatinine 0.8, sodium 138, potassium 3.8    CURRENT MEDICATIONS:  All medications reviewed and updated in Nursing Home EMR.     Provider: Ladonna MEDINA  Patient: Eric Buck   7/7/2023

## 2023-07-07 NOTE — ASSESSMENT & PLAN NOTE
Recently seen by [facility] psychiatry with recommendations to increase Cymbalta to 60 mg twice daily and continue current psychiatric medications including chlorpromazine, olanzapine, trazodone, clonazepam, and melatonin  Continue with ongoing supportive care  Continue to follow with psychology  Due for follow-up with DEIRDRE in 1 month, patient agreeable

## 2023-07-17 ENCOUNTER — NURSING HOME VISIT (OUTPATIENT)
Dept: GERIATRICS | Facility: OTHER | Age: 58
End: 2023-07-17
Payer: MEDICARE

## 2023-07-17 DIAGNOSIS — G32.81 CEREBELLAR ATAXIA IN DISEASES CLASSIFIED ELSEWHERE (HCC): Chronic | ICD-10-CM

## 2023-07-17 DIAGNOSIS — K59.01 SLOW TRANSIT CONSTIPATION: Chronic | ICD-10-CM

## 2023-07-17 DIAGNOSIS — G82.50 QUADRIPARESIS (HCC): Chronic | ICD-10-CM

## 2023-07-17 DIAGNOSIS — F31.32 BIPOLAR 1 DISORDER, DEPRESSED, MODERATE (HCC): Primary | Chronic | ICD-10-CM

## 2023-07-17 PROCEDURE — 99309 SBSQ NF CARE MODERATE MDM 30: CPT | Performed by: INTERNAL MEDICINE

## 2023-07-17 RX ORDER — MIRTAZAPINE 30 MG/1
30 TABLET, FILM COATED ORAL
COMMUNITY

## 2023-07-18 NOTE — ASSESSMENT & PLAN NOTE
Continue chlorpromazine, olanzapine, trazodone, valproic acid, duloxetine.   Increase mirtazapine dose to 30 mg every evening

## 2023-07-18 NOTE — PROGRESS NOTES
MONTHLY VISIT  Location: [facility]  POS: [facility]    NAME: Eric Buck  AGE: 60 y.o. SEX: male    DATE OF ENCOUNTER: 7/17/2023    ASSESSMENT AND PROBLEMS:  1. Bipolar 1 disorder, depressed, moderate (HCC)  Assessment & Plan:  Continue chlorpromazine, olanzapine, trazodone, valproic acid, duloxetine. Increase mirtazapine dose to 30 mg every evening      2. 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 Stable      3. Quadriparesis [facility])  Assessment & Plan:  Stable. Continue supportive care      4. Slow transit constipation  Assessment & Plan:  Stable. Continue Senokot      CHIEF COMPLAINT:  Monthly Visit    HISTORY OF PRESENT ILLNESS:  History taken from patient. He reports having difficulty with his mood and sleep. He reports that the problem with the mood is mostly regarding his sleep. He reports that he has hard time falling asleep and reports that he takes it about 1 hour to fall asleep every evening usually falling asleep by about 11:30 PM and usually waking up around 5 in the morning.   He reports otherwise stable neuromuscular status    REVIEW OF SYSTEMS:  Complete ROS negative other than as stated in HPI    HISTORY:  Medical Hx: Reviewed, unchanged  Family Hx: Reviewed, unchanged  Soc Hx: Reviewed, unchanged  No Known Allergies    PHYSICAL EXAM:  Vital Signs: /68, temp 97.4, HR 72, RR 16, O2 94% room air, weight 185 pounds  General: NAD, sitting in wheelchair  Eye Exam: anicteric sclera, no discharge  ENT: moist mucous membranes  Cardiovascular: regular rate, regular rhythm, no murmurs, rubs, or gallops  Pulmonary: no wheeze, no rhonchi  Abdominal: soft, nontender, nondistended, bowel sounds audible  Neurological: Awake alert, poor coordination of all 4 extremities  Skin: No significant lesions appreciated    PERTINENT LABS/IMAGING DATA:  6/15/2023: Hemoglobin 14.8, WBC 5.5, platelet 672, creatinine 0.93, sodium 139, potassium 4.3    Provider: Marzena Pinto MD MPH  Patient: Dayron Broussard

## 2023-08-15 ENCOUNTER — NURSING HOME VISIT (OUTPATIENT)
Dept: GERIATRICS | Facility: OTHER | Age: 58
End: 2023-08-15
Payer: MEDICARE

## 2023-08-15 DIAGNOSIS — G82.50 QUADRIPARESIS (HCC): Chronic | ICD-10-CM

## 2023-08-15 DIAGNOSIS — G47.59 OTHER PARASOMNIA: ICD-10-CM

## 2023-08-15 DIAGNOSIS — K59.01 SLOW TRANSIT CONSTIPATION: Chronic | ICD-10-CM

## 2023-08-15 DIAGNOSIS — F31.32 BIPOLAR 1 DISORDER, DEPRESSED, MODERATE (HCC): Primary | Chronic | ICD-10-CM

## 2023-08-15 DIAGNOSIS — G32.81 CEREBELLAR ATAXIA IN DISEASES CLASSIFIED ELSEWHERE (HCC): Chronic | ICD-10-CM

## 2023-08-15 PROBLEM — M65.4 DE QUERVAIN'S TENOSYNOVITIS, RIGHT: Status: RESOLVED | Noted: 2020-11-18 | Resolved: 2023-08-15

## 2023-08-15 PROCEDURE — 99309 SBSQ NF CARE MODERATE MDM 30: CPT

## 2023-08-15 NOTE — ASSESSMENT & PLAN NOTE
Stable  Continue on Mirtazapine, duloxetine, trazodone, chlorpromazine, valproic acid, clonazepam, and olanzapine.    Continue with ongoing supportive care  Continue to follow with psychology  Following with [facility]

## 2023-08-15 NOTE — ASSESSMENT & PLAN NOTE
Stable  Continues to self propel in wheelchair  Continue ADL assistance and ongoing supportive care at LTC

## 2023-08-15 NOTE — PROGRESS NOTES
MONTHLY VISIT  Location: [facility]  POS: LTC    NAME: Devora Rdz  AGE: 60 y.o. SEX: male    DATE OF ENCOUNTER: 8/15/2023    ASSESSMENT AND PROBLEMS:  1. Bipolar 1 disorder, depressed, moderate (HCC)  Assessment & Plan:  Stable  Continue on Mirtazapine, duloxetine, trazodone, chlorpromazine, valproic acid, clonazepam, and olanzapine. Continue with ongoing supportive care  Continue to follow with psychology  Following with [facility] psychiatry      2. Other parasomnia  Assessment & Plan:  Stable  Continue melatonin, trazodone, mirtazapine, zyprexa  Continue sleep hygiene      3. Quadriparesis [facility])  Assessment & Plan:  Stable  Continue ongoing supportive care at [address], continue manual wheelchair use, repositioning, skin interventions, socialization      4. Slow transit constipation  Assessment & Plan:  Stable  Continue Senna      5. 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 Continues with ongoing supportive care at [address] in setting of quadriplegia and cerebellar ataxia. Continues to mobilize in manual wheelchair. He reports he is doing well, continues to follow with psychiatry, sleep and mood improved. He continues on Mirtazapine, duloxetine, trazodone, chlorpromazine, valproic acid, clonazepam, and olanzapine. Reports stable appetite and BMs. Hx of mild progressive weight loss, current weight and BMI stable.          REVIEW OF SYSTEMS:  Complete ROS negative other than as stated in HPI    HISTORY:  Medical Hx: Reviewed, unchanged  Family Hx: Reviewed, unchanged  Soc Hx: Reviewed, unchanged  No Known Allergies    PHYSICAL EXAM:  Vital Signs: /64, temp 97.8, HR 99, RR 18, O2 97% on room air  Weight 186.6 pounds      General: NAD, sitting in wheelchair  Eye Exam: anicteric sclera, no discharge, EOMI intact  ENT: moist mucous membranes  Cardiovascular: regular rate, regular rhythm, no murmurs, rubs, or gallops  Pulmonary: CTA, unlabored, on RA  Abdominal: soft, nontender, nondistended, bowel sounds audible  Neurological: slurred speech. Awake, alert, engaged in conversation. Generalized weakness with poor coordination. Skin: No significant lesions appreciated, no significant rashes appreciated  Psych: somewhat flat, pleasant, cooperative    PERTINENT LABS/IMAGING DATA:    6/15 CBC, BMP: Hemoglobin 14.8, WBC 5.5, platelet 919, glucose 103, BUN 22, creatinine 0.93, sodium 139, potassium 4.3, calcium 8.9, EGFR 94    3/3/2023 CBC, CMP, lipid panel: Hemoglobin 12.9, WBC 4.2, platelet 472, BUN 17, creatinine 0.8, sodium 142, potassium 4.1, alk phos 32, total protein 5.9, EGFR 100, cholesterol 163, triglyceride 100, HDL 44, LDL 99  TSH 2.48    12/16 CBC, CMP: Hemoglobin 13.5, WBC 3.6, BUN 21, creatinine 0.8, sodium 138, potassium 3.8    CURRENT MEDICATIONS:  All medications reviewed and updated in Nursing Home EMR.     Provider: Salma MEDINA  Patient: Evangelina Goldsmith   8/15/2023

## 2023-09-11 ENCOUNTER — NURSING HOME VISIT (OUTPATIENT)
Dept: GERIATRICS | Facility: OTHER | Age: 58
End: 2023-09-11
Payer: MEDICARE

## 2023-09-11 VITALS — BODY MASS INDEX: 23.96 KG/M2 | WEIGHT: 186.7 LBS | HEIGHT: 74 IN

## 2023-09-11 DIAGNOSIS — G32.81 CEREBELLAR ATAXIA IN DISEASES CLASSIFIED ELSEWHERE (HCC): Chronic | ICD-10-CM

## 2023-09-11 DIAGNOSIS — K59.01 SLOW TRANSIT CONSTIPATION: Chronic | ICD-10-CM

## 2023-09-11 DIAGNOSIS — F31.32 BIPOLAR 1 DISORDER, DEPRESSED, MODERATE (HCC): Chronic | ICD-10-CM

## 2023-09-11 DIAGNOSIS — G82.50 QUADRIPARESIS (HCC): Primary | Chronic | ICD-10-CM

## 2023-09-11 PROCEDURE — 99309 SBSQ NF CARE MODERATE MDM 30: CPT | Performed by: INTERNAL MEDICINE

## 2023-09-11 RX ORDER — CLONAZEPAM 0.5 MG/1
0.25 TABLET ORAL
Qty: 30 TABLET | Refills: 5 | COMMUNITY
Start: 2023-09-11

## 2023-09-12 NOTE — PROGRESS NOTES
MONTHLY VISIT  Location: [facility]  POS: C    NAME: Ronnell Perez  AGE: 60 y.o. SEX: male    DATE OF ENCOUNTER: 9/11/2023    ASSESSMENT AND PROBLEMS:  1. Quadriparesis ([address])  Assessment & Plan:  Poor control of extremities. Continue supportive care and use of wheelchair      2. Slow transit constipation  Assessment & Plan:  Stable. Continue Senokot therapy      3. Bipolar 1 disorder, depressed, moderate (HCC)  Assessment & Plan:  Continue mirtazapine, duloxetine, chlorpromazine, olanzapine, trazodone, valproic acid. Decrease clonazepam to 0.25 mg every bedtime      4. 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 Continue supportive care      CHIEF COMPLAINT:  Monthly Visit    HISTORY OF PRESENT ILLNESS:  History taken from patient. He reports overall doing well with stable neuromuscular function and mood. Nurses report stable mood and neuromuscular function as well. REVIEW OF SYSTEMS:  Complete ROS negative other than as stated in HPI    HISTORY:  Medical Hx: Reviewed, unchanged  Family Hx: Reviewed, unchanged  Soc Hx: Reviewed, unchanged  No Known Allergies    PHYSICAL EXAM:  Vital Signs: /65, temp 96.9, HR 73, RR 16, O2 97% on room air, weight 186.7 pounds  General: NAD  Eye Exam: anicteric sclera, no discharge  ENT: moist mucous membranes  Cardiovascular: regular rate, regular rhythm, no murmurs, rubs, or gallops  Pulmonary: no wheeze, no rhonchi  Abdominal: soft, nontender, nondistended, bowel sounds audible  Neurological: Awake alert, poor coordination of all 4 extremities.   Skin: No significant lesions appreciated    PERTINENT LABS/IMAGING DATA:  6/15/2023: Hemoglobin 14.8, WBC 5.5, platelet 776, glucose 103, BUN 22, creatinine 0.93, sodium 139, potassium 4.3    Provider: Moreno Conway MD MPH  Patient: Ronnell Perez

## 2023-09-12 NOTE — ASSESSMENT & PLAN NOTE
Continue mirtazapine, duloxetine, chlorpromazine, olanzapine, trazodone, valproic acid.   Decrease clonazepam to 0.25 mg every bedtime

## 2023-10-02 ENCOUNTER — NURSING HOME VISIT (OUTPATIENT)
Dept: GERIATRICS | Facility: OTHER | Age: 58
End: 2023-10-02
Payer: MEDICARE

## 2023-10-02 DIAGNOSIS — R10.84 GENERALIZED ABDOMINAL PAIN: Primary | ICD-10-CM

## 2023-10-02 PROCEDURE — 99308 SBSQ NF CARE LOW MDM 20: CPT | Performed by: INTERNAL MEDICINE

## 2023-10-02 NOTE — PROGRESS NOTES
FOLLOW UP VISIT  Location: [facility]  POS: 32 ([facility])    NAME: Omid Rousseau  AGE: 60 y.o. SEX: male    ASSESSMENT AND PROBLEMS:  1. Generalized abdominal pain  Assessment & Plan:  Unclear cause. Start with obstruction series as well as CBC with differential and CMP      CHIEF COMPLAINT:  Abdominal pain    HISTORY OF PRESENT ILLNESS:  History taken from patient. He reports abdominal pain for the last 24 hours. He reports abdominal pain lasting through the night. This does not seem to be affected by positioning or movement. He reports the pain is sharp but also diffuse over his entire belly. Does not seem to be affected by eating or drinking. He reports eating less over the last 24 hours which has not changed things. He reports not having constipation having his regular bowel movements in the evening. Denies changes in urination as well. Denies fevers chills. REVIEW OF SYSTEMS:  Complete ROS negative other than as stated in HPI    HISTORY:  Medical Hx: Reviewed, unchanged  Family Hx: Reviewed, unchanged  Soc Hx: Reviewed, unchanged  No Known Allergies    PHYSICAL EXAM:  Vital Signs: /69, temp 97.8, HR 71, RR 18, O2 96% on room air  General: NAD, sitting in wheelchair  Eye Exam: anicteric sclera, no discharge  ENT: moist mucous membranes  Cardiovascular: regular rate, regular rhythm, no murmurs, rubs, or gallops  Pulmonary: no wheeze, no rhonchi  Abdominal: soft, nontender, nondistended, bowel sounds audible  Neurological: With generalized poor coordination of all 4 extremities.   Awake alert  Skin: No significant lesions appreciated    PERTINENT LABS/IMAGING DATA:  6/15/2023: Hemoglobin 14.8, WBC 5.5, platelet 163, BUN 22, creatinine 0.93, sodium 139    Provider: Marysol Salinas MD MPH  Patient: Omid Rousseau

## 2023-10-09 ENCOUNTER — NURSING HOME VISIT (OUTPATIENT)
Dept: GERIATRICS | Facility: OTHER | Age: 58
End: 2023-10-09
Payer: MEDICARE

## 2023-10-09 DIAGNOSIS — K59.01 SLOW TRANSIT CONSTIPATION: Chronic | ICD-10-CM

## 2023-10-09 DIAGNOSIS — F31.32 BIPOLAR 1 DISORDER, DEPRESSED, MODERATE (HCC): Chronic | ICD-10-CM

## 2023-10-09 DIAGNOSIS — G47.59 OTHER PARASOMNIA: ICD-10-CM

## 2023-10-09 DIAGNOSIS — G82.50 QUADRIPARESIS (HCC): Primary | Chronic | ICD-10-CM

## 2023-10-09 PROCEDURE — 99309 SBSQ NF CARE MODERATE MDM 30: CPT | Performed by: NURSE PRACTITIONER

## 2023-10-09 NOTE — ASSESSMENT & PLAN NOTE
Stable. Patient denies any mood and behavior disturbance on this visit. Continue the following meds:  * Chlorpromazine 200mg @ HS  * Clonazepam 0.25mg @ HS  * Duloxetine 60mg BID  * Olanzapine 10mg @ 5PM  * Valproic acid BID 750mg BID  * Trazodone 150mg @ HS  * Melatonin 6mg @ HS  Renal an d hepatic functions WNL (10/3/2023)  Continue Q6 months CMP checks.  Apr, 2024

## 2023-10-09 NOTE — PROGRESS NOTES
[address], [address] [place] [address], [facility]  [phone]    NAME: Leslie Vazquez  AGE: 60 y.o. SEX: male    Progress Note    Location: [address]  POS: 32 ([facility])    Assessment/Plan:    Quadriparesis ([facility])  Stable. Continue supportive care  Continue fall precaution. Bipolar 1 disorder, depressed, moderate (HCC)  Stable. Patient denies any mood and behavior disturbance on this visit. Continue the following meds:  * Chlorpromazine 200mg @ HS  * Clonazepam 0.25mg @ HS  * Duloxetine 60mg BID  * Olanzapine 10mg @ 5PM  * Valproic acid BID 750mg BID  * Trazodone 150mg @ HS  * Melatonin 6mg @ HS  Renal an d hepatic functions WNL (10/3/2023)  Continue Q6 months CMP checks. Apr, 2024    Other parasomnia  Patient reported regular non disrupted sleep on this visit  Continue Melatonin 6mg at bedtime / Trazodone 150mg at bedtime    Slow transit constipation  Continent and self toilets - reported regular daily bowel movements  Continue Senna 2 tablets daily      Chief complaint / Reason for visit: Follow- visit (Monthly)    History of Present Illness: This is a 60 y.o. Male patient residing at [address] [place] [address] Patient is seen and examined today to follow-up acute and chronic medical conditions. Patient is OOB sitting in his manual wheelchair - alert, cooperative, not in distress - verbal with clear coherent speech - at baseline mentation/ orientation. Patient denies any acute medical concerns for this visit, stated, " I'm good" - denies any acute medical concerns during ROS assessment including pain. Per nursing, no acute medical concerns for this visit - described as at baseline. Nursing and prior provider notes reviewed on this visit. Discussed visit with PCP and nursing staff/ supervisor. Review of Systems:  Per history of present illness, all other systems reviewed and negative.     HISTORY:  Medical Hx: Reviewed, unchanged  Family Hx: Reviewed, unchanged  Soc Hx: Reviewed, unchanged    ALLERGY: Reviewed, unchanged. No Known Allergies     PHYSICAL EXAM:  Vital Signs: T97.8F -P71 -R16 BP: 113/69 (10/1/2023) SpO2: 96% RA  Weight: 192.8 lbs (10/2/2023) <= 186.7 lbs (9/2/2023) <= 186.6 lbs (8/1/2023)    General: NAD. Well appearing. No acute distress. Head: Atraumatic. Normocephalic. Eye Exam: anicteric sclera, no discharge, PERRLA, No injection. Wears glasses. Oral Exam: moist mucous membranes, no buccaloropharyngeal erythema, palatine tonsils WNL. Neck Exam: no anterior cervical lymphadenopathy noted, neck supple. Trachea midline, no carotid bruit, no masses  Cardiovascular: regular rate, regular rhythm, no murmurs, rubs, or gallops. S1 and S2  Pulmonary: no wheeze, no rhonchi, no rales. No chest tenderness. Normal chest wall expansion  Abdominal: soft, non-tender, nondistended, bowel sounds audible x 4 quadrants. No palpable hepatosplenomegaly, no tympany  : Non distended bladder. Continent. Extremities and skin: no edema noted, no rashes. Intact skin  Neurological: alert, cooperative and responsive, Oriented x 3. no tics, normal sensation to pressure and light touch.  moving all 4 extremities symmetrically. Wheelchair bound. Laboratory results / Imaging reviewed: Hard copy/ies in medical chart:    * CBC with diff (10/3/2023) = WNL     * CMP (10/3/2023) = WNL except:  Alk. phosph: 31 (L)  TPro: 5.6 (L)    Current Medications: All medications reviewed and updated in Nursing Home Chart    Please note: This note was completed in part utilizing a voice-recognition software may have been used in the preparation of this document. Grammatical errors, random word insertion, spelling mistakes, and incomplete sentences may be an occasional consequence of the system secondary to software limitations, ambient noise and hardware issues. Occasional wrong word or "sound-alike" substitutions may have occurred due to the inherent limitations of voice recognition software.  At the time of dictation, efforts were made to edit, clarify and/or correct errors. Interpretation should be guided by context. Please read the chart carefully and recognize, using context, where substitutions have occurred. If you have any questions or concerns about the context, text or information contained within the body of this dictation, please contact myself, the provider, for further clarification.       ADAM Villagomez  10/9/2023

## 2023-10-09 NOTE — ASSESSMENT & PLAN NOTE
I spoke with Solo this morning and he plans to make the changes we discussed.  See note for details.Thanks for your help!Shameka  Patient reported regular non disrupted sleep on this visit  Continue Melatonin 6mg at bedtime / Trazodone 150mg at bedtime

## 2023-11-06 ENCOUNTER — NURSING HOME VISIT (OUTPATIENT)
Dept: GERIATRICS | Facility: OTHER | Age: 58
End: 2023-11-06
Payer: MEDICARE

## 2023-11-06 DIAGNOSIS — G32.81 CEREBELLAR ATAXIA IN DISEASES CLASSIFIED ELSEWHERE (HCC): Primary | Chronic | ICD-10-CM

## 2023-11-06 DIAGNOSIS — F31.32 BIPOLAR 1 DISORDER, DEPRESSED, MODERATE (HCC): Chronic | ICD-10-CM

## 2023-11-06 DIAGNOSIS — K59.01 SLOW TRANSIT CONSTIPATION: Chronic | ICD-10-CM

## 2023-11-06 DIAGNOSIS — Z86.69 HISTORY OF ANOXIC BRAIN INJURY: Chronic | ICD-10-CM

## 2023-11-06 DIAGNOSIS — G82.50 QUADRIPARESIS (HCC): Chronic | ICD-10-CM

## 2023-11-06 PROCEDURE — 99309 SBSQ NF CARE MODERATE MDM 30: CPT | Performed by: INTERNAL MEDICINE

## 2023-11-07 NOTE — PROGRESS NOTES
MONTHLY VISIT  Location: [facility]  POS: LTC    NAME: Elizabeth Martino  AGE: 60 y.o. SEX: male    DATE OF ENCOUNTER: 11/6/2023    ASSESSMENT AND PROBLEMS:  1. 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 Stable. Continue supportive care      2. History of anoxic brain injury  Assessment & Plan:  Stable. Continue supportive care      3. Bipolar 1 disorder, depressed, moderate (HCC)  Assessment & Plan:  Stable. Continue chlorpromazine, olanzapine, trazodone, valproic acid, duloxetine, clonazepam.      4. Quadriparesis (HCC)  Assessment & Plan:  Stable. Continue supportive care      5. Slow transit constipation  Assessment & Plan:  Stable. Continue Senokot daily        CHIEF COMPLAINT:  Monthly Visit    HISTORY OF PRESENT ILLNESS:  History taken from patient.   He reports doing well overall with good mood and without changes in neuromuscular status or general functional status    REVIEW OF SYSTEMS:  Complete ROS negative other than as stated in HPI    HISTORY:  Medical Hx: Reviewed, unchanged  Family Hx: Reviewed, unchanged  Soc Hx: Reviewed, unchanged  No Known Allergies    PHYSICAL EXAM:  Vital Signs: /81, temp 98.3, HR 84, RR 20, O2 94% on room air  General: NAD  Eye Exam: anicteric sclera, no discharge  ENT: moist mucous membranes  Cardiovascular: regular rate, regular rhythm, no murmurs, rubs, or gallops  Pulmonary: no wheeze, no rhonchi  Abdominal: soft, nontender, nondistended, bowel sounds audible  Neurological: Awake alert, poor coordination of all 4 extremities  Skin: No significant lesions appreciated    PERTINENT LABS/IMAGING DATA:  10/3/2023: Hemoglobin 13.1, WBC 7.9, platelet 087, creatinine 0.85, sodium 140, potassium 3.9, AST 13, ALT 19    Provider: Terrie Chowdhury MD MPH  Patient: Elizabeth Martino

## 2023-12-05 ENCOUNTER — NURSING HOME VISIT (OUTPATIENT)
Dept: GERIATRICS | Facility: OTHER | Age: 58
End: 2023-12-05
Payer: MEDICARE

## 2023-12-05 DIAGNOSIS — G47.59 OTHER PARASOMNIA: ICD-10-CM

## 2023-12-05 DIAGNOSIS — G82.50 QUADRIPARESIS (HCC): Chronic | ICD-10-CM

## 2023-12-05 DIAGNOSIS — F31.32 BIPOLAR 1 DISORDER, DEPRESSED, MODERATE (HCC): Chronic | ICD-10-CM

## 2023-12-05 DIAGNOSIS — G32.81 CEREBELLAR ATAXIA IN DISEASES CLASSIFIED ELSEWHERE (HCC): Primary | Chronic | ICD-10-CM

## 2023-12-05 DIAGNOSIS — K59.01 SLOW TRANSIT CONSTIPATION: Chronic | ICD-10-CM

## 2023-12-05 PROCEDURE — 99309 SBSQ NF CARE MODERATE MDM 30: CPT

## 2023-12-05 RX ORDER — CHLORPROMAZINE HYDROCHLORIDE 200 MG/1
200 TABLET, FILM COATED ORAL
COMMUNITY

## 2023-12-05 NOTE — ASSESSMENT & PLAN NOTE
Stable  Continue on Mirtazapine, duloxetine, trazodone, chlorpromazine, valproic acid, clonazepam, and olanzapine.    Change Chlorpromazine to one 200 mg tablet to reduce pill load at night - Reviewed with pharmacist  Continue with ongoing supportive care  Continue to follow with psychology

## 2023-12-05 NOTE — PROGRESS NOTES
MONTHLY VISIT  [facility]  POS: LTC    NAME: Roshni Sheridan  AGE: 60 y.o. SEX: male    DATE OF ENCOUNTER: 12/5/2023    ASSESSMENT AND PROBLEMS:  1. 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 Bipolar 1 disorder, depressed, moderate (HCC)  Assessment & Plan:  Stable  Continue on Mirtazapine, duloxetine, trazodone, chlorpromazine, valproic acid, clonazepam, and olanzapine. Change Chlorpromazine to one 200 mg tablet to reduce pill load at night - Reviewed with pharmacist  Continue with ongoing supportive care  Continue to follow with psychology      3. Other parasomnia  Assessment & Plan:  Stable  Continue melatonin, trazodone, mirtazapine, zyprexa  Continue sleep hygiene      4. Slow transit constipation  Assessment & Plan:  Stable  Continue Senna      5. Quadriparesis [facility])  Assessment & Plan:  Stable  Continue ongoing supportive care at [address] [place] [address], continue manual wheelchair use, repositioning, skin interventions, socialization          CHIEF COMPLAINT:  Monthly Visit, medication review    HISTORY OF PRESENT ILLNESS:  History taken from patient, staff, chart    Lillian Dawkins is a 59 y/o male LTC resident of [address] [place] [address] with hx including but not limited to quadriplegia, depression, bipolar, hypertension, and insomnia seen today for monthly visit. Patient would also like to decrease the amount of meds he takes at night. Continues with ongoing supportive care at [address] [place] [address] in setting of quadriplegia and cerebellar ataxia. Continues to mobilize in manual wheelchair. He reports he is doing well, reports stable mood. He continues on Mirtazapine, duloxetine, trazodone, chlorpromazine, valproic acid, clonazepam, and olanzapine. Reports stable appetite and Bms. Weight also remains stable.          REVIEW OF SYSTEMS:  Complete ROS negative other than as stated in HPI    HISTORY:  Medical Hx: Reviewed, unchanged  Family Hx: Reviewed, unchanged  Soc Hx: Reviewed, unchanged  No Known Allergies    PHYSICAL EXAM:  Vital Signs: /73, temp 97.2, HR 75, RR 18, O2 98% on room air  Weight 193.3 pounds      General: NAD, sitting in wheelchair  Eye Exam: anicteric sclera, no discharge, EOMI intact  ENT: moist mucous membranes  Cardiovascular: regular rate, regular rhythm, no murmurs, rubs, or gallops  Pulmonary: CTA, unlabored, on RA  Abdominal: soft, nontender, nondistended, bowel sounds audible  Neurological: slurred speech. Awake, alert, engaged in conversation. Generalized weakness with poor coordination. Skin: No significant lesions appreciated, no significant rashes appreciated  Psych: somewhat flat, pleasant, cooperative    PERTINENT LABS/IMAGING DATA:    10/3 CBC, CMP: Hemoglobin 13.1, WBC 7.9, platelet 773, glucose 91, BUN 17, creatinine 0.85, sodium 140, potassium 3.9, alk phos 31, total protein 5.6, AST 13, ALT 19, EGFR 100    6/15 CBC, BMP: Hemoglobin 14.8, WBC 5.5, platelet 404, glucose 103, BUN 22, creatinine 0.93, sodium 139, potassium 4.3, calcium 8.9, EGFR 94    3/3/2023 CBC, CMP, lipid panel: Hemoglobin 12.9, WBC 4.2, platelet 992, BUN 17, creatinine 0.8, sodium 142, potassium 4.1, alk phos 32, total protein 5.9, EGFR 100, cholesterol 163, triglyceride 100, HDL 44, LDL 99  TSH 2.48      CURRENT MEDICATIONS:  All medications reviewed and updated in Nursing Home EMR.     Provider: Cristian MEDINA  Patient: Justine Ballard   12/5/2023

## 2023-12-24 ENCOUNTER — TELEPHONE (OUTPATIENT)
Dept: OTHER | Facility: OTHER | Age: 58
End: 2023-12-24

## 2023-12-24 NOTE — TELEPHONE ENCOUNTER
[facility]. Paged on call provider.

## 2023-12-26 ENCOUNTER — NURSING HOME VISIT (OUTPATIENT)
Dept: GERIATRICS | Facility: OTHER | Age: 58
End: 2023-12-26
Payer: MEDICARE
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
## 2023-12-26 DIAGNOSIS — J10.1 INFLUENZA A: Primary | ICD-10-CM

## 2023-12-26 PROCEDURE — 99308 SBSQ NF CARE LOW MDM 20: CPT

## 2023-12-26 NOTE — ASSESSMENT & PLAN NOTE
12/24 while transferring from wheelchair to bed  Continue localized wound care to right elbow tear  Right knee appears stable   Continue supportive care  PRN Tylenol

## 2023-12-26 NOTE — PROGRESS NOTES
"Follow up Visit  Location: [facility]  POS: 32 (LT)    NAME: Tong Fletcher  AGE: 58 y.o. SEX: male    ASSESSMENT AND PROBLEMS:  1. Influenza A  Assessment & Plan:  Overall stable with mild symptoms  + 12/23  Continue Tamiflu - adjust to BID dosing   Add TID Guaifenesin and PRN Flonase  Continue vital sign monitoring  Continue PRN Tylenol and supportive care   Continue isolation precautions       2. Fall, initial encounter  Assessment & Plan:   12/24 while transferring from wheelchair to bed  Continue localized wound care to right elbow tear  Right knee appears stable   Continue supportive care  PRN Tylenol              CHIEF COMPLAINT:  \"Flu, fall\"    HISTORY OF PRESENT ILLNESS:       Tong Fletcher is a 59 y/o male [facility] resident of [facility] with hx including but not limited to quadriplegia, depression, bipolar, hypertension, and insomnia seen today for influenza A follow up and fall.      Patient recently tested positive for influenza A. Reports occasional cough, and fatigue, but denies any SOB. Vitals overall stable, low grade temp. Was started on Tamiflu prophylactic for [facility] influenza outbreak.   Reports he is eating and drinking as normal.  He also had a fall while he was transferring from his wheelchair to the bed on 12/24.  Sustained right elbow skin tear and reported right knee pain. He reports no pain on today's visit, able to bed knees and move about as usual.       REVIEW OF SYSTEMS:  Per history of present illness, all other systems reviewed and negative.    HISTORY:  Past Medical History:   Diagnosis Date    Ataxia     Bipolar 1 disorder (HCC)     De Quervain's tenosynovitis, right 11/18/2020    Depression     Diplopia     Dysarthria     Hypertension     Insomnia     Onychomycosis     Optic atrophy     PVD (peripheral vascular disease) ([facility])     Quadriplegia ([facility])      Family History   Problem Relation Age of Onset    No Known Problems Mother     No Known Problems Father      Social History "     Tobacco Use    Smoking status: Never    Smokeless tobacco: Never   Substance Use Topics    Alcohol use: Not Currently    Drug use: Never     No Known Allergies    PHYSICAL EXAM:  Vital Signs: /59, temp 99.3, HR 70, RR 16, O2 97% on room air  Weight 191.4 pounds    General: NAD, sitting in wheelchair  Eye Exam: anicteric sclera, no discharge, EOMI intact  ENT: moist mucous membranes  Cardiovascular: regular rate, regular rhythm, no murmurs, rubs, or gallops  Pulmonary: CTA, unlabored, on RA  Abdominal: soft, nontender, nondistended, bowel sounds audible  Neurological: slurred speech. Awake, alert, engaged in conversation. Generalized weakness with poor coordination.  Skin: right elbow skin tear. Right knee skin intact without erythema or swelling.  Psych: somewhat flat, pleasant, cooperative         PERTINENT LABS/IMAGING DATA:    12/23: Influenza A positive     12/15 CBC, CMP: Hemoglobin 13.1, WBC 5.5, platelet 147, glucose 78, BUN 22, creatinine 0.78, sodium 138, potassium 3.9, EGFR 102    10/3 CBC, CMP: Hemoglobin 13.1, WBC 7.9, platelet 152, glucose 91, BUN 17, creatinine 0.85, sodium 140, potassium 3.9, alk phos 31, total protein 5.6, AST 13, ALT 19, EGFR 100    CURRENT MEDICATIONS:  All medications reviewed and updated in Nursing Home EMR.    Provider: Alexsander MEDINA  Patient: Tong Fletcher   12/26/2023

## 2023-12-26 NOTE — ASSESSMENT & PLAN NOTE
Overall stable with mild symptoms  + 12/23  Continue Tamiflu - adjust to BID dosing   Add TID Guaifenesin and PRN Flonase  Continue vital sign monitoring  Continue PRN Tylenol and supportive care   Continue isolation precautions

## 2023-12-31 ENCOUNTER — TELEPHONE (OUTPATIENT)
Dept: OTHER | Facility: OTHER | Age: 58
End: 2023-12-31

## 2023-12-31 NOTE — TELEPHONE ENCOUNTER
Carolann from [facility] called to inform the on call of the pt's fall.    On call contacted via TC.

## 2024-01-02 ENCOUNTER — NURSING HOME VISIT (OUTPATIENT)
Dept: GERIATRICS | Facility: OTHER | Age: 59
End: 2024-01-02
Payer: MEDICARE

## 2024-01-02 DIAGNOSIS — J10.1 INFLUENZA A: ICD-10-CM
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
## 2024-01-02 PROCEDURE — 99308 SBSQ NF CARE LOW MDM 20: CPT

## 2024-01-02 NOTE — PROGRESS NOTES
"Follow up Visit  Location: [facility]  POS: 32 (LT)    NAME: Tong Fletcher  AGE: 58 y.o. SEX: male    ASSESSMENT AND PROBLEMS:  1. Fall, initial encounter  Assessment & Plan:  Fell on 12/24 and 12/31 during transfer likely in setting of weakness  Sustained mild skin tears, otherwise no overt or reported injury  Check labs  Assist with transfers   Fall precautions      2. Influenza A  Assessment & Plan:  Stable although weakness likely related to influenza  Will check CBC and BMP tomorrow 1/3  Completed course of tamiflu and isolation precautions   Continue to monitor for new symptoms          CHIEF COMPLAINT:  \"Flu, fall\"    HISTORY OF PRESENT ILLNESS:       Tong Fletcher is a 59 y/o male [facility] resident of [facility] with hx including but not limited to quadriplegia, depression, bipolar, hypertension, and insomnia seen today for influenza A follow up and fall.      Patient tested positive for influenza A on 12/23 s/p course of Tamiflu.  He reports feeling overall stable, does report some weakness.  He sustained separate falls, one on 12/24 and again on 12/31. Both falls occurring while transferring.  Patient reports falls occurred due to weakness, he denies any dizziness. He reports he is eating and drinking as usual. Denies any fever, chills, or SOB.       REVIEW OF SYSTEMS:  Per history of present illness, all other systems reviewed and negative.    HISTORY:  Past Medical History:   Diagnosis Date    Ataxia     Bipolar 1 disorder (HCC)     De Quervain's tenosynovitis, right 11/18/2020    Depression     Diplopia     Dysarthria     Hypertension     Insomnia     Onychomycosis     Optic atrophy     PVD (peripheral vascular disease) (HCC)     Quadriplegia (HCC)      Family History   Problem Relation Age of Onset    No Known Problems Mother     No Known Problems Father      Social History     Tobacco Use    Smoking status: Never    Smokeless tobacco: Never   Substance Use Topics    Alcohol use: Not Currently "    Drug use: Never     No Known Allergies    PHYSICAL EXAM:  Vital Signs: /80, temp 97.3, HR 75, RR 18, O2 97% on room air  Weight 191.4 pounds    General: NAD, sitting in wheelchair  Eye Exam: anicteric sclera, no discharge, EOMI intact  ENT: moist mucous membranes  Cardiovascular: regular rate, regular rhythm, no murmurs, rubs, or gallops  Pulmonary: CTA, unlabored, on RA  Abdominal: soft, nontender, nondistended, bowel sounds audible  Neurological: slurred speech. Awake, alert, engaged in conversation. Generalized weakness with poor coordination.  Skin: right elbow skin tear. Right knee skin intact without erythema or swelling.  Psych: somewhat flat, pleasant, cooperative         PERTINENT LABS/IMAGING DATA:    12/23: Influenza A positive     12/15 CBC, CMP: Hemoglobin 13.1, WBC 5.5, platelet 147, glucose 78, BUN 22, creatinine 0.78, sodium 138, potassium 3.9, EGFR 102    10/3 CBC, CMP: Hemoglobin 13.1, WBC 7.9, platelet 152, glucose 91, BUN 17, creatinine 0.85, sodium 140, potassium 3.9, alk phos 31, total protein 5.6, AST 13, ALT 19, EGFR 100    CURRENT MEDICATIONS:  All medications reviewed and updated in Nursing Home EMR.    Provider: Alexsander MEDINA  Patient: Tong Fletcher   01/02/24

## 2024-01-02 NOTE — ASSESSMENT & PLAN NOTE
Stable although weakness likely related to influenza  Will check CBC and BMP tomorrow 1/3  Completed course of tamiflu and isolation precautions   Continue to monitor for new symptoms

## 2024-01-08 ENCOUNTER — NURSING HOME VISIT (OUTPATIENT)
Dept: GERIATRICS | Facility: OTHER | Age: 59
End: 2024-01-08
Payer: MEDICARE

## 2024-01-08 DIAGNOSIS — K59.01 SLOW TRANSIT CONSTIPATION: Chronic | ICD-10-CM

## 2024-01-08 DIAGNOSIS — G32.81 CEREBELLAR ATAXIA IN DISEASES CLASSIFIED ELSEWHERE (HCC): Chronic | ICD-10-CM

## 2024-01-08 DIAGNOSIS — F31.32 BIPOLAR 1 DISORDER, DEPRESSED, MODERATE (HCC): Chronic | ICD-10-CM

## 2024-01-08 DIAGNOSIS — G82.50 QUADRIPARESIS (HCC): Primary | Chronic | ICD-10-CM

## 2024-01-08 PROBLEM — M54.9 UPPER BACK PAIN, CHRONIC: Status: RESOLVED | Noted: 2020-03-09 | Resolved: 2024-01-08
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
## 2024-01-08 PROBLEM — J10.1 INFLUENZA A: Status: RESOLVED | Noted: 2023-12-26 | Resolved: 2024-01-08

## 2024-01-08 PROBLEM — R10.84 GENERALIZED ABDOMINAL PAIN: Status: RESOLVED | Noted: 2023-10-02 | Resolved: 2024-01-08

## 2024-01-08 PROBLEM — G89.29 UPPER BACK PAIN, CHRONIC: Status: RESOLVED | Noted: 2020-03-09 | Resolved: 2024-01-08

## 2024-01-08 PROCEDURE — 99309 SBSQ NF CARE MODERATE MDM 30: CPT | Performed by: INTERNAL MEDICINE

## 2024-01-08 RX ORDER — TRAZODONE HYDROCHLORIDE 150 MG/1
150 TABLET ORAL
COMMUNITY
Start: 2023-12-30

## 2024-01-08 NOTE — PROGRESS NOTES
MONTHLY VISIT  Location: [facility]  POS: [facility]    NAME: Tong Fletcher  AGE: 58 y.o. SEX: male    DATE OF ENCOUNTER: 1/8/2024    ASSESSMENT AND PROBLEMS:  1. Quadriparesis (HCC)  Assessment & Plan:  Stable.  Continue supportive care      2. Bipolar 1 disorder, depressed, moderate (HCC)  Assessment & Plan:  Issues with mood are somewhat unclear.  Unclear how much is related to insomnia and issues with roommate versus other primary psychiatric concerns.  Continue with mirtazapine, duloxetine, trazodone, chlorpromazine, valproic acid, clonazepam, olanzapine.  Would have low threshold to taper off of chlorpromazine as less likely to be helpful and already on olanzapine      3. Slow transit constipation  Assessment & Plan:  Stable.  Continue Senokot      4. 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  Stable        CHIEF COMPLAINT:  Monthly Visit    HISTORY OF PRESENT ILLNESS:  History taken from patient.  He reports doing well overall.  Reports having some increase difficulty sleeping due to his roommate and the television being on.  Reports that his mood is doing okay.  Nursing staff report concern regarding his mood and that he is seemingly lacks active and interested in doing things during the day    REVIEW OF SYSTEMS:  Complete ROS negative other than as stated in HPI    HISTORY:  Medical Hx: Reviewed, unchanged  Family Hx: Reviewed, unchanged  Soc Hx: Reviewed, unchanged  No Known Allergies    PHYSICAL EXAM:  Vital Signs: /78, temp 98.2, HR 86, RR 18, O2 97% on room air  General: NAD, sitting in wheelchair  Eye Exam: anicteric sclera, no discharge  ENT: moist mucous membranes  Cardiovascular: regular rate, regular rhythm, no murmurs, rubs, or gallops  Pulmonary: no wheeze, no rhonchi  Abdominal: soft, nontender, nondistended, bowel sounds audible  Neurological: Awake, alert, per coronation of all 4  extremities.  Skin: No significant lesions appreciated    PERTINENT LABS/IMAGING DATA:  1/3/2024: Hemoglobin 12.8, WBC 5.6, platelet 249, creatinine 0.79, sodium 142, potassium 4.0, BUN 22    Provider: Henry Levine MD MPH  Patient: Tong Fletcher

## 2024-01-08 NOTE — ASSESSMENT & PLAN NOTE
Issues with mood are somewhat unclear.  Unclear how much is related to insomnia and issues with roommate versus other primary psychiatric concerns.  Continue with mirtazapine, duloxetine, trazodone, chlorpromazine, valproic acid, clonazepam, olanzapine.  Would have low threshold to taper off of chlorpromazine as less likely to be helpful and already on olanzapine

## 2024-02-06 ENCOUNTER — NURSING HOME VISIT (OUTPATIENT)
Dept: GERIATRICS | Facility: OTHER | Age: 59
End: 2024-02-06
Payer: MEDICARE

## 2024-02-06 DIAGNOSIS — K59.01 SLOW TRANSIT CONSTIPATION: Chronic | ICD-10-CM

## 2024-02-06 DIAGNOSIS — G32.81 CEREBELLAR ATAXIA IN DISEASES CLASSIFIED ELSEWHERE (HCC): Primary | Chronic | ICD-10-CM

## 2024-02-06 DIAGNOSIS — G47.59 OTHER PARASOMNIA: ICD-10-CM

## 2024-02-06 DIAGNOSIS — F31.32 BIPOLAR 1 DISORDER, DEPRESSED, MODERATE (HCC): Chronic | ICD-10-CM

## 2024-02-06 PROCEDURE — 99309 SBSQ NF CARE MODERATE MDM 30: CPT

## 2024-02-06 NOTE — PROGRESS NOTES
MONTHLY VISIT  Location: [facility]  POS: LT    NAME: Tong Fletcher  AGE: 58 y.o. SEX: male    DATE OF ENCOUNTER: 2/6/2024    ASSESSMENT AND PROBLEMS:  1. 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 Bipolar 1 disorder, depressed, moderate (HCC)  Assessment & Plan:  Stable  Continue on Mirtazapine, duloxetine, trazodone, chlorpromazine, valproic acid, clonazepam, and olanzapine.   Continue with ongoing supportive care  Continue to follow with psychology, has also followed with [facility] in the past as well       3. Other parasomnia  Assessment & Plan:  Fluctuates, multifactorial  Patient deferred any medications at this time   Mood stable   Continue sleep hygiene, melatonin, trazodone, mirtazapine, and zyprexa       4. Slow transit constipation  Assessment & Plan:  Stable  Continue Senna           CHIEF COMPLAINT:  Monthly Visit    HISTORY OF PRESENT ILLNESS:  History taken from patient, staff, chart    Tong Fletcher is a 59 y/o male LT resident of [facility] with hx including but not limited to quadriplegia, depression, bipolar, hypertension, and insomnia seen today for monthly visit.    Continues with ongoing supportive care at [facility] in setting of quadriplegia and cerebellar ataxia. Continues to mobilize in manual wheelchair.  Neuromuscular status stable.  He continues to report a degree of difficulty maintaining sleep, reports he wakes up early.  There are some contributing environmental factors as well.  Mood otherwise stable.  Nursing denies any concerns at this time.  He continues on Mirtazapine, duloxetine, trazodone, chlorpromazine, valproic acid, clonazepam, and olanzapine. Reports stable appetite and BMs.  BMI and weight stable.        REVIEW OF SYSTEMS:  Complete ROS negative other than as stated in HPI    HISTORY:  Medical  Hx: Reviewed, unchanged  Family Hx: Reviewed, unchanged  Soc Hx: Reviewed, unchanged  No Known Allergies    PHYSICAL EXAM:  Vital Signs: /74, temp 96.7, HR 70, RR 16, O2 97% on room air  Weight 188.5 pounds      General: NAD, sitting in wheelchair  Eye Exam: anicteric sclera, no discharge, EOMI intact  ENT: moist mucous membranes  Cardiovascular: regular rate, regular rhythm, no murmurs, rubs, or gallops  Pulmonary: CTA, unlabored, on RA  Abdominal: soft, nontender, nondistended, bowel sounds audible  Neurological: slurred speech. Awake, alert, engaged in conversation. Generalized weakness with poor coordination.  Skin: No significant lesions appreciated, no significant rashes appreciated  Psych: somewhat flat, pleasant, cooperative    PERTINENT LABS/IMAGING DATA:    1/3/2024 CBC, CMP: Hemoglobin 12.8, WBC 5.6, platelet 249, glucose 85, BUN 22, creatinine 0.79, sodium 142, potassium 4.0, alk phos 30, albumin 3.4, total protein 6.0, AST 16, ALT 25, EGFR 102    12/23/2023: Influenza A positive      12/15/2023 CBC, CMP: Hemoglobin 13.1, WBC 5.5, platelet 147, glucose 78, BUN 22, creatinine 0.78, sodium 138, potassium 3.9, EGFR 102     10/3/2023 CBC, CMP: Hemoglobin 13.1, WBC 7.9, platelet 152, glucose 91, BUN 17, creatinine 0.85, sodium 140, potassium 3.9, alk phos 31, total protein 5.6, AST 13, ALT 19, EGFR 100       CURRENT MEDICATIONS:  All medications reviewed and updated in Nursing Home EMR.    Provider: Alexsander MEDINA  [facility]  Patient: Tong Fletcher   02/06/24

## 2024-02-06 NOTE — ASSESSMENT & PLAN NOTE
Stable  Continue on Mirtazapine, duloxetine, trazodone, chlorpromazine, valproic acid, clonazepam, and olanzapine.   Continue with ongoing supportive care  Continue to follow with psychology, has also followed with [facility] in the past as well

## 2024-02-06 NOTE — ASSESSMENT & PLAN NOTE
Fluctuates, multifactorial  Patient deferred any medications at this time   Mood stable   Continue sleep hygiene, melatonin, trazodone, mirtazapine, and zyprexa

## 2024-03-04 ENCOUNTER — NURSING HOME VISIT (OUTPATIENT)
Dept: GERIATRICS | Facility: OTHER | Age: 59
End: 2024-03-04
Payer: MEDICARE

## 2024-03-04 DIAGNOSIS — G32.81 CEREBELLAR ATAXIA IN DISEASES CLASSIFIED ELSEWHERE (HCC): Chronic | ICD-10-CM

## 2024-03-04 DIAGNOSIS — K59.01 SLOW TRANSIT CONSTIPATION: Chronic | ICD-10-CM

## 2024-03-04 DIAGNOSIS — G82.50 QUADRIPARESIS (HCC): Primary | Chronic | ICD-10-CM

## 2024-03-04 DIAGNOSIS — F31.32 BIPOLAR 1 DISORDER, DEPRESSED, MODERATE (HCC): Chronic | ICD-10-CM

## 2024-03-04 PROCEDURE — 99309 SBSQ NF CARE MODERATE MDM 30: CPT | Performed by: INTERNAL MEDICINE

## 2024-03-05 NOTE — PROGRESS NOTES
MONTHLY VISIT  Location: [facility]  POS: [facility]    NAME: Tong Fletcher  AGE: 58 y.o. SEX: male    DATE OF ENCOUNTER: 3/4/2024    ASSESSMENT AND PROBLEMS:  1. Quadriparesis (HCC)  Assessment & Plan:  Stable.  Continue supportive care      2. Bipolar 1 disorder, depressed, moderate (HCC)  Assessment & Plan:  Stable.  Continue chlorpromazine, olanzapine, trazodone, valproic acid, duloxetine, clonazepam      3. 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  Continue supportive care.  Patient continues to self-propel in wheelchair      4. Slow transit constipation  Assessment & Plan:  Stable.  Continue Senokot        CHIEF COMPLAINT:  Monthly Visit    HISTORY OF PRESENT ILLNESS:  History taken from patient.  He reports stable situation without changes in medical issues or functional status.  Nurses agree regarding stable medical status and functional status as well.    REVIEW OF SYSTEMS:  Complete ROS negative other than as stated in HPI    HISTORY:  Medical Hx: Reviewed, unchanged  Family Hx: Reviewed, unchanged  Soc Hx: Reviewed, unchanged  No Known Allergies    PHYSICAL EXAM:  Vital Signs: /74, temp 96.7, HR 70, RR 16, O2 97% room air  General: NAD, sitting in wheelchair  Eye Exam: anicteric sclera, no discharge  ENT: moist mucous membranes  Cardiovascular: regular rate, regular rhythm, no murmurs, rubs, or gallops  Pulmonary: no wheeze, no rhonchi  Abdominal: soft, nontender, nondistended, bowel sounds audible  Neurological: Poor coordination of all 4 extremities.  Insight intact 1/3/2024: Hemoglobin 12.8, WBC 5.6, platelet 249, glucose 85, BUN 22, creatinine 0.79, sodium 142, potassium 4.0, AST 16, ALT 25  Skin: No significant lesions appreciated    PERTINENT LABS/IMAGING DATA:  1/3/2024: Hemoglobin 12.8, WBC 5.6, platelet 249, creatinine 0.79, sodium 142, potassium 4.0, AST 16, ALT 25    Provider: Henry Levine MD MPH  Patient: Tong Fletcher

## 2024-03-19 NOTE — ASSESSMENT & PLAN NOTE
CC: WELL WOMAN (age 26-39)     Patient Care Team:  Liset Song MD as PCP - General (Family Medicine)    Last visit with me was on  2023 for wwe    HPI:  Patient is a 29 y.o. who presents for her well woman exam today.  History reviewed with patient.   Patient is without complaints or concerns today.     CONTRACEPTION:  none  GARDASIL: yes x 3  HX ABNL PAPS: yrs ago-no treatment needed    REVIEW OF DATA/ HEALTH MAINTENANCE  LAST ANNUAL:   2023      Past Medical History:   Diagnosis Date    ADD (attention deficit disorder)     Anxiety     Family history of ovarian cancer     mat aunt-35/ myriad neg/ LTR=14% breast    GERD (gastroesophageal reflux disease)     History of abnormal cervical Pap smear     IBS (irritable bowel syndrome)     Low vitamin D level      SURGICAL HX:   has a past surgical history that includes [place] tooth extraction.    SOCIAL HX:   reports that she has never smoked. She has never used smokeless tobacco. She reports current alcohol use. She reports that she does not use drugs.    Outpatient Medications Prior to Visit   Medication Sig Dispense Refill    ALPRAZolam (XANAX) 2 MG Tab       ARIPiprazole (ABILIFY) 2 MG Tab       busPIRone (BUSPAR) 10 MG tablet       cholecalciferol, vitamin D3, (VITAMIN D3) 100 mcg (4,000 unit) Cap       dextroamphetamine-amphetamine 30 mg Tab       FLUoxetine 40 MG capsule       zolpidem (AMBIEN) 5 MG Tab       FLUoxetine 20 MG capsule       FLUoxetine 20 MG tablet        No facility-administered medications prior to visit.      VITALS:  Blood pressure 121/83, pulse 79, weight 57.5 kg (126 lb 12.8 oz), last menstrual period 2024.  Body mass index is 23.19 kg/m².     PHYSICAL EXAM-  APPEARANCE: Well appearing, in no acute distress.  CV/ PULM: no resp distress, normal resp effort  PSYCH: Normal mood and affect, cooperative  SKIN: No rashes, lesions, or abnormal bruising  ABD: Soft, no masses, tenderness, or distension  BREASTS:  Fell on 12/24 and 12/31 during transfer likely in setting of weakness  Sustained mild skin tears, otherwise no overt or reported injury  Check labs  Assist with transfers   Fall precautions   No skin changes,nipple dc. No palpable masses or tenderness  PELVIC:  VULVA: Normal female genitalia. No lesions  BLADDER: No suprapubic tenderness  VAGINA/ CVX:  No lesions, no d/c  UTERUS: mobile, non-tender  ADNEXA: No masses, tenderness, or fullness.  ANUS/ PERINEUM: Normal tone.  No lesions.           *patient verbally consented for exam and female chaperone present for entire exam    ASSESSMENT and PLAN:  Encounter for well woman exam with routine gynecological exam  -     Liquid-based pap smear, screening       FOLLOWUP:  1 yr for wwe or sooner prn    COUNSELING:  Patient was counseled today on recommendation for yearly pelvic exams, current Pap guidelines, self breast exams, contraception, and recommendations for annual screening mammograms at age 40. Encouraged patient to see her PCP for other health maintenance.        [address] [facility]

## 2024-03-21 ENCOUNTER — NURSING HOME VISIT (OUTPATIENT)
Dept: GERIATRICS | Facility: OTHER | Age: 59
End: 2024-03-21
Payer: MEDICARE

## 2024-03-21 DIAGNOSIS — G44.229 CHRONIC TENSION-TYPE HEADACHE, NOT INTRACTABLE: Primary | ICD-10-CM

## 2024-03-21 PROCEDURE — 99308 SBSQ NF CARE LOW MDM 20: CPT

## 2024-03-21 NOTE — PROGRESS NOTES
"Follow up Visit  Location: [facility]  POS: 32 ([facility])    NAME: Tong Fletcher  AGE: 59 y.o. SEX: male    ASSESSMENT AND PROBLEMS:  1. Chronic tension-type headache, not intractable  Assessment & Plan:  Chronic  Patient reports wearing and eye patch in the past due to double vision which is also chronic for him  Left message with OT to further evaluate patient  Continue Ibuprofen as needed for headaches as this has been effective as well, PRN Tylenol also available  Monitor for acute neurological changes          CHIEF COMPLAINT:    \"Headache\"    HISTORY OF PRESENT ILLNESS:    History obtained from patient, staff, chart      Tong Fletcher is a 60 y/o male [facility] resident of [facility] with hx including but not limited to quadriplegia, depression, bipolar, hypertension, and insomnia seen today per staff request for reported headache.    Staff reports patient required 2 doses of ibuprofen yesterday for headache.  Patient reports he has chronic history of headaches.  Reports he has worn right eye patch in the past due to  chronic double vision. Wondering if he can wear patch again.  Reports ibuprofen has been effective.  No acute neurological changes.      REVIEW OF SYSTEMS:  Per history of present illness, all other systems reviewed and negative.    HISTORY:  Past Medical History:   Diagnosis Date    Ataxia     Bipolar 1 disorder (HCC)     De Quervain's tenosynovitis, right 11/18/2020    Depression     Diplopia     Dysarthria     Hypertension     Insomnia     Onychomycosis     Optic atrophy     PVD (peripheral vascular disease) ([facility])     Quadriplegia ([facility])      Family History   Problem Relation Age of Onset    No Known Problems Mother     No Known Problems Father      Social History     Tobacco Use    Smoking status: Never    Smokeless tobacco: Never   Substance Use Topics    Alcohol use: Not Currently    Drug use: Never     No Known Allergies    PHYSICAL EXAM:    Vital Signs: /67, temp 98.1, HR 70, RR " 16, O2 97% on room air  Weight 190.7 pounds    General: NAD, sitting in wheelchair  Eye Exam: anicteric sclera, no discharge, EOMI intact, nystagmus  Pulmonary: unlabored, on RA  Abdominal: soft, nontender, nondistended, bowel sounds audible  Neurological: slurred speech. Awake, alert, engaged in conversation. Generalized weakness with poor coordination.  Psych: somewhat flat, pleasant, cooperative         PERTINENT LABS/IMAGING DATA:    1/3/2024 CBC, CMP: Hemoglobin 12.8, WBC 5.6, platelet 249, glucose 85, BUN 22, creatinine 0.79, sodium 142, potassium 4.0, alk phos 30, albumin 3.4, total protein 6.0, AST 16, ALT 25, EGFR 102    CURRENT MEDICATIONS:  All medications reviewed and updated in Nursing Home EMR.    Provider: Alexsander MEDINA  [facility]  Patient: Tong Fletcher   03/21/24

## 2024-03-21 NOTE — ASSESSMENT & PLAN NOTE
Chronic  Patient reports wearing and eye patch in the past due to double vision which is also chronic for him  Left message with OT to further evaluate patient  Continue Ibuprofen as needed for headaches as this has been effective as well, PRN Tylenol also available  Monitor for acute neurological changes

## 2024-04-04 ENCOUNTER — NURSING HOME VISIT (OUTPATIENT)
Dept: GERIATRICS | Facility: OTHER | Age: 59
End: 2024-04-04
Payer: MEDICARE

## 2024-04-04 DIAGNOSIS — F31.32 BIPOLAR 1 DISORDER, DEPRESSED, MODERATE (HCC): Chronic | ICD-10-CM

## 2024-04-04 DIAGNOSIS — G32.81 CEREBELLAR ATAXIA IN DISEASES CLASSIFIED ELSEWHERE (HCC): Primary | Chronic | ICD-10-CM

## 2024-04-04 DIAGNOSIS — M54.2 NECK AND SHOULDER PAIN: ICD-10-CM

## 2024-04-04 DIAGNOSIS — G47.59 OTHER PARASOMNIA: ICD-10-CM

## 2024-04-04 DIAGNOSIS — K59.01 SLOW TRANSIT CONSTIPATION: Chronic | ICD-10-CM

## 2024-04-04 DIAGNOSIS — M25.519 NECK AND SHOULDER PAIN: ICD-10-CM

## 2024-04-04 PROCEDURE — 99309 SBSQ NF CARE MODERATE MDM 30: CPT

## 2024-04-04 NOTE — PROGRESS NOTES
MONTHLY VISIT  Location: [facility]  POS: LTC    NAME: Tong Fletcher  AGE: 59 y.o. SEX: male    DATE OF ENCOUNTER: 4/4/2024    ASSESSMENT AND PROBLEMS:  1. 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 Neck and shoulder pain  Assessment & Plan:  Acute  No trauma or acute change in function  Continue conservative management and monitoring  Continue to follow with physical therapy  PRN Tylenol and Ibuprofen available      3. Bipolar 1 disorder, depressed, moderate (HCC)  Assessment & Plan:  Stable  Continue on Mirtazapine 30 mg QHS, duloxetine 60 mg BID, trazodone 150 mg QHS, chlorpromazine 200 mg Q HS, valproic acid 750 mg Q AM and QHS, clonazepam 0.25 mg QHS, and olanzapine 10 mg at dinnertime.   Continue with ongoing supportive care, monitor mood and behaviors  Check CBC, CMP, TSH, free T4, HA1C, vitamin D, in June  Continue to follow with psychology, has also followed with [facility] in the past as well          4. Other parasomnia  Assessment & Plan:  Fluctuates, multifactorial  Patient deferred any medications at this time   Mood stable   Continue sleep hygiene, melatonin, trazodone, mirtazapine, and zyprexa       5. Slow transit constipation  Assessment & Plan:  Stable  Continue Senna              CHIEF COMPLAINT:  Monthly Visit    HISTORY OF PRESENT ILLNESS:  History taken from patient, staff, chart    Tong Fletcher is a 60 y/o male LT resident of [facility] with hx including but not limited to quadriplegia, depression, bipolar, hypertension, and insomnia seen today for monthly visit.    Continues with ongoing supportive care at [facility] in setting of quadriplegia and cerebellar ataxia. Continues to mobilize in manual wheelchair.  Neuromuscular status stable.  He does report some upper back/shoulder discomfort,  "reports he feels as though he has a \"pinched nerve\".  Continues to participate with PT at LTC.  He otherwise reports stable status.  Mood and appetite stable.   No acute changes to sleep habits.  BMI, weight, and recent vitals stable.  No current concerns from nursing        REVIEW OF SYSTEMS:  Complete ROS negative other than as stated in HPI    HISTORY:  Medical Hx: Reviewed, unchanged  Family Hx: Reviewed, unchanged  Soc Hx: Reviewed, unchanged  No Known Allergies    PHYSICAL EXAM:  Vital Signs: /70, temp 98, HR 70, RR 19, O2 96% on room air  Weight 189.7 pounds, BMI 24.4      General: NAD, sitting in wheelchair  Eye Exam: anicteric sclera, no discharge, EOMI intact  ENT: moist mucous membranes  Cardiovascular: regular rate, regular rhythm, no murmurs, rubs, or gallops  Pulmonary: CTA, unlabored, on RA  Abdominal: soft, nontender, nondistended, bowel sounds audible  Neurological: slurred speech. Awake, alert, engaged in conversation. Generalized weakness with poor coordination.  Able to actively move extremities.   Skin: No significant lesions appreciated, no significant rashes appreciated  Psych: somewhat flat, pleasant, cooperative    PERTINENT LABS/IMAGING DATA:    1/3/2024 CBC, CMP: Hemoglobin 12.8, WBC 5.6, platelet 249, glucose 85, BUN 22, creatinine 0.79, sodium 142, potassium 4.0, alk phos 30, albumin 3.4, total protein 6.0, AST 16, ALT 25, EGFR 102    12/23/2023: Influenza A positive      12/15/2023 CBC, CMP: Hemoglobin 13.1, WBC 5.5, platelet 147, glucose 78, BUN 22, creatinine 0.78, sodium 138, potassium 3.9, EGFR 102     10/3/2023 CBC, CMP: Hemoglobin 13.1, WBC 7.9, platelet 152, glucose 91, BUN 17, creatinine 0.85, sodium 140, potassium 3.9, alk phos 31, total protein 5.6, AST 13, ALT 19, EGFR 100       CURRENT MEDICATIONS:  All medications reviewed and updated in Nursing Home EMR.    Provider: Alexsander MEDINA  [facility]  Patient: Tong Fletcher   04/04/24   "

## 2024-04-04 NOTE — ASSESSMENT & PLAN NOTE
Stable  Continues to self propel in wheelchair  Continue ADL assistance, physical therapy, and ongoing supportive care at LTC

## 2024-04-04 NOTE — ASSESSMENT & PLAN NOTE
Acute  No trauma or acute change in function  Continue conservative management and monitoring  Continue to follow with physical therapy  PRN Tylenol and Ibuprofen available

## 2024-04-04 NOTE — ASSESSMENT & PLAN NOTE
Stable  Continue on Mirtazapine 30 mg QHS, duloxetine 60 mg BID, trazodone 150 mg QHS, chlorpromazine 200 mg Q HS, valproic acid 750 mg Q AM and QHS, clonazepam 0.25 mg QHS, and olanzapine 10 mg at dinnertime.   Continue with ongoing supportive care, monitor mood and behaviors  Check CBC, CMP, TSH, free T4, HA1C, vitamin D, in June  Continue to follow with psychology, has also followed with [facility] in the past as well

## 2024-04-18 ENCOUNTER — NURSING HOME VISIT (OUTPATIENT)
Dept: GERIATRICS | Facility: OTHER | Age: 59
End: 2024-04-18
Payer: MEDICARE

## 2024-04-18 DIAGNOSIS — M25.519 NECK AND SHOULDER PAIN: Primary | ICD-10-CM

## 2024-04-18 DIAGNOSIS — M54.2 NECK AND SHOULDER PAIN: Primary | ICD-10-CM

## 2024-04-18 PROCEDURE — 99308 SBSQ NF CARE LOW MDM 20: CPT

## 2024-04-18 NOTE — PROGRESS NOTES
"ACUTE VISIT  Location: [facility]  POS: LTC    NAME: Tong Fletcher  AGE: 59 y.o. SEX: male    DATE OF ENCOUNTER: 4/18/2024    ASSESSMENT AND PROBLEMS:  1. Neck and shoulder pain  Assessment & Plan:  Initially resolved however returned last night   Reports it may due to positioning of sleep   PT following, he does self transfer out of wheelchair, no known trauma  No acute changes to ROM  Improvement with ibuprofen use - will hold ibuprofen and start 5 day course of Meloxicam   Continue PRN Tylenol and monitor symptoms                 CHIEF COMPLAINT:    Shoulder pain     HISTORY OF PRESENT ILLNESS:  History taken from patient, staff, chart    Tong Fletcher is a 60 y/o male LT resident of [facility] with hx including but not limited to quadriplegia, depression, bipolar, hypertension, and insomnia seen today for reports of shoulder pain.     He recently reported some upper back/right shoulder pain that felt \"pinched nerve\", initially resolved but reports pain returned last night. Thinks it may be related to positioning in bed.  Reports Ibuprofen has been effective. No acute changes to ROM, continues to work with PT and mobilize in manual wheelchair.   He other wise reports stable status.       REVIEW OF SYSTEMS:  Complete ROS negative other than as stated in HPI    HISTORY:  Medical Hx: Reviewed, unchanged  Family Hx: Reviewed, unchanged  Soc Hx: Reviewed, unchanged  No Known Allergies    PHYSICAL EXAM:  Vital Signs:       General: NAD, sitting in wheelchair  Pulmonary: unlabored, on RA  Abdominal: soft, nontender, nondistended, bowel sounds audible  Neurological: slurred speech. Awake, alert, engaged in conversation. Generalized weakness with poor coordination.  Able to actively move extremities.   Musc: Active ROM of right shoulder/arm, no tenderness to palpation, no overt abnormality   Skin: No significant lesions appreciated, no significant rashes appreciated  Psych: somewhat flat, " elvira petit    PERTINENT LABS/IMAGING DATA:    1/3/2024 CBC, CMP: Hemoglobin 12.8, WBC 5.6, platelet 249, glucose 85, BUN 22, creatinine 0.79, sodium 142, potassium 4.0, alk phos 30, albumin 3.4, total protein 6.0, AST 16, ALT 25, EGFR 102    12/23/2023: Influenza A positive      12/15/2023 CBC, CMP: Hemoglobin 13.1, WBC 5.5, platelet 147, glucose 78, BUN 22, creatinine 0.78, sodium 138, potassium 3.9, EGFR 102     10/3/2023 CBC, CMP: Hemoglobin 13.1, WBC 7.9, platelet 152, glucose 91, BUN 17, creatinine 0.85, sodium 140, potassium 3.9, alk phos 31, total protein 5.6, AST 13, ALT 19, EGFR 100       CURRENT MEDICATIONS:  All medications reviewed and updated in Nursing Home EMR.    Provider: Alexsander MEDINA  [facility]  Patient: Tong Salinaste   04/18/24

## 2024-04-18 NOTE — ASSESSMENT & PLAN NOTE
Initially resolved however returned last night   Reports it may due to positioning of sleep   PT following, he does self transfer out of wheelchair, no known trauma  No acute changes to ROM  Improvement with ibuprofen use - will hold ibuprofen and start 5 day course of Meloxicam   Continue PRN Tylenol and monitor symptoms

## 2024-04-19 ENCOUNTER — NURSING HOME VISIT (OUTPATIENT)
Dept: GERIATRICS | Facility: OTHER | Age: 59
End: 2024-04-19
Payer: MEDICARE

## 2024-04-19 DIAGNOSIS — M54.2 NECK AND SHOULDER PAIN: ICD-10-CM

## 2024-04-19 DIAGNOSIS — M25.519 NECK AND SHOULDER PAIN: ICD-10-CM

## 2024-04-19 DIAGNOSIS — B34.9 VIRAL SYNDROME: ICD-10-CM
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
## 2024-04-19 PROBLEM — R29.6 FALLS: Status: ACTIVE | Noted: 2023-12-26

## 2024-04-19 PROCEDURE — 99309 SBSQ NF CARE MODERATE MDM 30: CPT

## 2024-04-19 NOTE — PROGRESS NOTES
ACUTE VISIT  Location: [facility]  POS: LTC    NAME: Tong Fletcher  AGE: 59 y.o. SEX: male    DATE OF ENCOUNTER: 4/19/2024    ASSESSMENT AND PROBLEMS:  1. Fall, initial encounter  Assessment & Plan:  S/p fall during transfer back to bed from wheelchair, slid to ground on 4/19   No overt injury, no pain, no acute neurological changes  Continue to monitor vital signs Q shift and post fall protocol with neuro checks   Assist with transfers as needed, follow up with therapy      2. Viral syndrome  Assessment & Plan:  Low grade temp, cough, mild hoarseness   Check CBC, CMP on 4/20  Check comprehensive respiratory viral panel  Add PRN guaifenesin, consider adding ATC   Start vital sign monitoring Q shift  Encourage PO fluid intake   Supportive care  PRN Tylenol available      3. Neck and shoulder pain  Assessment & Plan:  Continue with course of daily Meloxicam   PRN Tylenol   Following with PT   Continue to monitor for ongoing/worsening symptoms                  CHIEF COMPLAINT:    Fall, fever    HISTORY OF PRESENT ILLNESS:  History taken from patient, staff, chart  Recent nursing notes reviewed    Tong Fletcher is a 58 y/o male LTC resident of [facility] with hx including but not limited to quadriplegia, depression, bipolar, hypertension, and insomnia seen today s/p fall.    Patient sustained fall this morning when he was transferring himself back in bed, reports he slipped and lowered himself to the floor, staff found him sitting on the floor.  Neurological status intact at the time.  He was also noted to have mild low-grade temp at 99.6.  He denies any pain from the fall.   Upon today's assessment, patient seen sitting in wheelchair eating lunch.  He reports some hoarseness and scratchy throat and mild occasional cough.  He denies any fever, fatigue, chills, myalgia, shortness of breath, nausea, vomiting or any  symptoms.  Nursing otherwise reports stable status.       REVIEW OF  SYSTEMS:  Complete ROS negative other than as stated in HPI    HISTORY:  Medical Hx: Reviewed, unchanged  Family Hx: Reviewed, unchanged  Soc Hx: Reviewed, unchanged  No Known Allergies    PHYSICAL EXAM:  Vital Signs: /76, temp 98.1, HR 72, RR 16, O2 96% on room air    General: NAD, sitting in wheelchair  HEENT: No throat erythema, no overt rhinorrhea, mild hoarseness   Cardiac: Regular rate, regular rhythm   Pulmonary: CTA, unlabored, on RA  Abdominal: soft, nontender, nondistended, bowel sounds audible  Neurological: slurred speech. Awake, alert, engaged in conversation. Generalized weakness with poor coordination.  Able to actively move extremities.   Musc: Active ROM of right shoulder/arm, no tenderness to palpation, no overt abnormality   Skin: No significant lesions appreciated, no significant rashes appreciated  Psych: somewhat flat, pleasant, cooperative    PERTINENT LABS/IMAGING DATA:    1/3/2024 CBC, CMP: Hemoglobin 12.8, WBC 5.6, platelet 249, glucose 85, BUN 22, creatinine 0.79, sodium 142, potassium 4.0, alk phos 30, albumin 3.4, total protein 6.0, AST 16, ALT 25, EGFR 102    12/23/2023: Influenza A positive      12/15/2023 CBC, CMP: Hemoglobin 13.1, WBC 5.5, platelet 147, glucose 78, BUN 22, creatinine 0.78, sodium 138, potassium 3.9, EGFR 102     10/3/2023 CBC, CMP: Hemoglobin 13.1, WBC 7.9, platelet 152, glucose 91, BUN 17, creatinine 0.85, sodium 140, potassium 3.9, alk phos 31, total protein 5.6, AST 13, ALT 19, EGFR 100       CURRENT MEDICATIONS:  All medications reviewed and updated in Nursing Home EMR.    Provider: Alexsander MEDINA  [facility]  Patient: Tong Fletcher   04/19/24

## 2024-04-19 NOTE — ASSESSMENT & PLAN NOTE
Low grade temp, cough, mild hoarseness   Check CBC, CMP on 4/20  Check comprehensive respiratory viral panel  Add PRN guaifenesin, consider adding ATC   Start vital sign monitoring Q shift  Encourage PO fluid intake   Supportive care  PRN Tylenol available

## 2024-04-19 NOTE — ASSESSMENT & PLAN NOTE
Continue with course of daily Meloxicam   PRN Tylenol   Following with PT   Continue to monitor for ongoing/worsening symptoms

## 2024-04-19 NOTE — ASSESSMENT & PLAN NOTE
S/p fall during transfer back to bed from wheelchair, slid to ground on 4/19   No overt injury, no pain, no acute neurological changes  Continue to monitor vital signs Q shift and post fall protocol with neuro checks   Assist with transfers as needed, follow up with therapy

## 2024-04-20 ENCOUNTER — TELEPHONE (OUTPATIENT)
Dept: OTHER | Facility: OTHER | Age: 59
End: 2024-04-20

## 2024-04-20 NOTE — TELEPHONE ENCOUNTER
Deshaun Carson from [facility] called needing to report lab results.    On call notified via TC

## 2024-04-21 ENCOUNTER — TELEPHONE (OUTPATIENT)
Dept: OTHER | Facility: OTHER | Age: 59
End: 2024-04-21

## 2024-04-21 NOTE — TELEPHONE ENCOUNTER
TC message sent to on call Dr Blair.    Pt Tong Fletcher 1965    Had another fall.    Carolann supervisor requesting call back at [phone]    Read receipt noted 2017

## 2024-04-21 NOTE — TELEPHONE ENCOUNTER
[facility] report and review lab results[facility]    On call notified via tc

## 2024-04-22 ENCOUNTER — NURSING HOME VISIT (OUTPATIENT)
Dept: GERIATRICS | Facility: OTHER | Age: 59
End: 2024-04-22
Payer: MEDICARE

## 2024-04-22 DIAGNOSIS — B34.8 INFECTION DUE TO HUMAN METAPNEUMOVIRUS (HMPV): Primary | ICD-10-CM
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
## 2024-04-22 DIAGNOSIS — E87.1 HYPONATREMIA: ICD-10-CM

## 2024-04-22 DIAGNOSIS — D69.6 THROMBOCYTOPENIA (HCC): ICD-10-CM

## 2024-04-22 PROBLEM — B34.9 VIRAL SYNDROME: Status: RESOLVED | Noted: 2020-12-14 | Resolved: 2024-04-22

## 2024-04-22 PROCEDURE — 99309 SBSQ NF CARE MODERATE MDM 30: CPT

## 2024-04-22 NOTE — ASSESSMENT & PLAN NOTE
Positive 4/19  Start guaifenesin around-the-clock x 5 days, continue as needed  Continue as needed Tylenol and ibuprofen for myalgia  Continue to encourage p.o. intake  Continue isolation precautions 4/26  Continue supportive care

## 2024-04-22 NOTE — PROGRESS NOTES
ACUTE VISIT  Location: [facility]  POS: LTC    NAME: Tong Fletcher  AGE: 59 y.o. SEX: male    DATE OF ENCOUNTER: 4/22/2024    ASSESSMENT AND PROBLEMS:  1. Infection due to human metapneumovirus (hMPV)  Assessment & Plan:  Positive 4/19  Start guaifenesin around-the-clock x 5 days, continue as needed  Continue as needed Tylenol and ibuprofen for myalgia  Continue to encourage p.o. intake  Continue isolation precautions 4/26  Continue supportive care          2. Fall, initial encounter  Assessment & Plan:  S/p fall at night on 4/20, previously had fall on 4/19  Without overt injury   Neuro checks have been stable   Wheelchair reportedly not locking correctly - locks to be assessed  Continue to assist with transfers  Increased risk for falls, maintain fall precautions       3. Thrombocytopenia (HCC)  Assessment & Plan:  Acutely noted  4/21 platelet: 133  History of fluctuating platelets in the past, no overt signs or symptoms of bleeding  Repeat CBC Thursday 4/25      4. Hyponatremia  Assessment & Plan:  Mild, asymptomatic  4/21 sodium: 133  Repeat CMP Thursday 4/25                  CHIEF COMPLAINT:    Labs, follow-up    HISTORY OF PRESENT ILLNESS:  History taken from patient, staff, chart  Recent nursing notes reviewed    Tong Fletcher is a 60 y/o male LTC resident of [facility] with hx including but not limited to quadriplegia, depression, bipolar, hypertension, and insomnia seen today for follow-up lab results as well as fall.    Patient recently presented with viral like symptoms, tested positive for human metapneumovirus over the weekend.  He also sustained a second fall over the weekend, no LOC, headstrike, or overt injury.  Lab work over the weekend also showing hyponatremia and thrombocytopenia.  No overt signs or symptoms of bleeding.  Patient reports chest congestion and cough as well as myalgia today.  He otherwise reports he is eating and drinking without issue.  He denies any  fever, fatigue, chills, shortness of breath, nausea, vomiting, lightheadedness.  Most recent vital signs stable.  No additional concerns from nursing at this time.      REVIEW OF SYSTEMS:  Complete ROS negative other than as stated in HPI    HISTORY:  Medical Hx: Reviewed, unchanged  Family Hx: Reviewed, unchanged  Soc Hx: Reviewed, unchanged  No Known Allergies    PHYSICAL EXAM:  Vital Signs: /67, temp 98.3, HR 85, RR 20, O2 93% on room air    General: NAD, sitting in wheelchair  HEENT: No throat erythema, no overt rhinorrhea, mild hoarseness   Cardiac: Regular rate, regular rhythm   Pulmonary: CTA, unlabored, on RA  Abdominal: soft, nontender, nondistended, bowel sounds audible  Neurological: slurred speech. Awake, alert, engaged in conversation. Generalized weakness with poor coordination.  Able to actively move extremities.   Skin: No significant lesions appreciated, no significant rashes appreciated  Psych: somewhat flat, pleasant, cooperative        PERTINENT LABS/IMAGING DATA:    4/21 CBC, BMP: Hemoglobin 12.6, WBC 4.7, platelet 133, glucose 100, BUN 17, creatinine 0.73, sodium 133, potassium 3.7, EGFR 104    4/19 viral panel: Positive human metapneumovirus    1/3/2024 CBC, CMP: Hemoglobin 12.8, WBC 5.6, platelet 249, glucose 85, BUN 22, creatinine 0.79, sodium 142, potassium 4.0, alk phos 30, albumin 3.4, total protein 6.0, AST 16, ALT 25, EGFR 102    12/23/2023: Influenza A positive      12/15/2023 CBC, CMP: Hemoglobin 13.1, WBC 5.5, platelet 147, glucose 78, BUN 22, creatinine 0.78, sodium 138, potassium 3.9, EGFR 102     10/3/2023 CBC, CMP: Hemoglobin 13.1, WBC 7.9, platelet 152, glucose 91, BUN 17, creatinine 0.85, sodium 140, potassium 3.9, alk phos 31, total protein 5.6, AST 13, ALT 19, EGFR 100       CURRENT MEDICATIONS:  All medications reviewed and updated in Nursing Home EMR.    Provider: Alexsander MEDINA  [facility]  Patient: Tong Fletcher   04/22/24

## 2024-04-22 NOTE — ASSESSMENT & PLAN NOTE
S/p fall at night on 4/20, previously had fall on 4/19  Without overt injury   Neuro checks have been stable   Wheelchair reportedly not locking correctly - locks to be assessed  Continue to assist with transfers  Increased risk for falls, maintain fall precautions

## 2024-04-22 NOTE — ASSESSMENT & PLAN NOTE
Acutely noted  4/21 platelet: 133  History of fluctuating platelets in the past, no overt signs or symptoms of bleeding  Repeat CBC Thursday 4/25

## 2024-04-26 ENCOUNTER — NURSING HOME VISIT (OUTPATIENT)
Dept: GERIATRICS | Facility: OTHER | Age: 59
End: 2024-04-26
Payer: MEDICARE

## 2024-04-26 DIAGNOSIS — E87.1 HYPONATREMIA: ICD-10-CM

## 2024-04-26 DIAGNOSIS — B34.8 INFECTION DUE TO HUMAN METAPNEUMOVIRUS (HMPV): Primary | ICD-10-CM

## 2024-04-26 DIAGNOSIS — D69.6 THROMBOCYTOPENIA (HCC): ICD-10-CM

## 2024-04-26 DIAGNOSIS — M25.551 RIGHT HIP PAIN: ICD-10-CM

## 2024-04-26 PROCEDURE — 99308 SBSQ NF CARE LOW MDM 20: CPT

## 2024-04-26 NOTE — ASSESSMENT & PLAN NOTE
Mild but recently stable  4/25 platelet 134  History of fluctuating platelets in the past, no overt signs or symptoms of bleeding  Monitor routine labs

## 2024-04-26 NOTE — ASSESSMENT & PLAN NOTE
Acute pain status post fall, now resolved  4/24 right hip x-ray negative for fracture, mild OA noted  Continue supportive care

## 2024-04-26 NOTE — PROGRESS NOTES
ACUTE VISIT  Location: [facility]  POS: LTC    NAME: Tong Fletcher  AGE: 59 y.o. SEX: male    DATE OF ENCOUNTER: 4/26/2024    ASSESSMENT AND PROBLEMS:  1. Infection due to human metapneumovirus (hMPV)  Assessment & Plan:  Positive 4/19  With significant symptom improvement  Mild lingering cough and congestion, continue guaifenesin  Continue supportive care  Continue isolation precautions through today  Continue supportive care      2. Hyponatremia  Assessment & Plan:  Stable, resolved  4/25 sodium: 138  Continue to monitor routine labs      3. Thrombocytopenia (HCC)  Assessment & Plan:  Mild but recently stable  4/25 platelet 134  History of fluctuating platelets in the past, no overt signs or symptoms of bleeding  Monitor routine labs        4. Right hip pain  Assessment & Plan:  Acute pain status post fall, now resolved  4/24 right hip x-ray negative for fracture, mild OA noted  Continue supportive care            CHIEF COMPLAINT:    Acute illness follow-up, imaging results    HISTORY OF PRESENT ILLNESS:  History taken from patient, staff, chart  Recent nursing notes reviewed    Tong Fletcher is a 60 y/o male LT resident of [facility] with hx including but not limited to quadriplegia, depression, bipolar, hypertension, and insomnia seen today for follow-up acute respiratory illness, labs and x-ray results.    Patient initially presented with viral like symptoms, found to be human metapneumovirus positive on 4/19.  He reports previous symptoms of bodyaches, sore throat, congestion and cough have greatly improved.  Repeat labs reviewed showing stable status.  He had right hip imaging completed due to hip pain status post falls, imaging negative for fracture.  He continues to mobilize in manual wheelchair.  He reports he is eating and drinking without issue.  He denies any fever, fatigue, chills, shortness of breath, nausea, vomiting, lightheadedness.  Most recent vital signs stable.  No  additional concerns from nursing at this time.      REVIEW OF SYSTEMS:  Complete ROS negative other than as stated in HPI    HISTORY:  Medical Hx: Reviewed, unchanged  Family Hx: Reviewed, unchanged  Soc Hx: Reviewed, unchanged  No Known Allergies    PHYSICAL EXAM:  Vital Signs: /85, temp 97.2, HR 69, RR 18, O2 96% on room air    General: NAD, sitting in wheelchair  Pulmonary: unlabored, on RA  Neurological: slurred speech. Awake, alert, engaged in conversation. Generalized weakness with poor coordination.  Able to actively move extremities.   Skin: No significant lesions appreciated, no significant rashes appreciated  Psych: somewhat flat, pleasant, cooperative        PERTINENT LABS/IMAGING DATA:    4/25 CBC, CMP: Hemoglobin 12.1, WBC 4.5, platelet 134, glucose 82, BUN 11, creatinine 0.65, sodium 138, potassium 3.8, AST 26, ALT 36, EGFR 108      4/24 HIP UNI W OR W/O PELVIS 2-3 V, RIGHT  FINDINGS: The right hip joint is in alignment, but there is narrowing of the joint space due to mild degenerative changes. There is mild degenerative spurring. No fracture or dislocation is seen. Pubic rami are intact.  CONCLUSION: Mild osteoarthritis of the right hip.    4/21 CBC, BMP: Hemoglobin 12.6, WBC 4.7, platelet 133, glucose 100, BUN 17, creatinine 0.73, sodium 133, potassium 3.7, EGFR 104    4/19 viral panel: Positive human metapneumovirus    1/3/2024 CBC, CMP: Hemoglobin 12.8, WBC 5.6, platelet 249, glucose 85, BUN 22, creatinine 0.79, sodium 142, potassium 4.0, alk phos 30, albumin 3.4, total protein 6.0, AST 16, ALT 25, EGFR 102    12/23/2023: Influenza A positive      12/15/2023 CBC, CMP: Hemoglobin 13.1, WBC 5.5, platelet 147, glucose 78, BUN 22, creatinine 0.78, sodium 138, potassium 3.9, EGFR 102     10/3/2023 CBC, CMP: Hemoglobin 13.1, WBC 7.9, platelet 152, glucose 91, BUN 17, creatinine 0.85, sodium 140, potassium 3.9, alk phos 31, total protein 5.6, AST 13, ALT 19, EGFR 100       CURRENT MEDICATIONS:  All  medications reviewed and updated in Nursing Home EMR.    Provider: Alexsander Segovia [facility]  Patient: Tong Fletcher   04/26/24

## 2024-04-26 NOTE — ASSESSMENT & PLAN NOTE
Positive 4/19  With significant symptom improvement  Mild lingering cough and congestion, continue guaifenesin  Continue supportive care  Continue isolation precautions through today  Continue supportive care

## 2024-05-02 ENCOUNTER — NURSING HOME VISIT (OUTPATIENT)
Dept: GERIATRICS | Facility: OTHER | Age: 59
End: 2024-05-02
Payer: MEDICARE

## 2024-05-02 DIAGNOSIS — K59.01 SLOW TRANSIT CONSTIPATION: Chronic | ICD-10-CM

## 2024-05-02 DIAGNOSIS — F31.32 BIPOLAR 1 DISORDER, DEPRESSED, MODERATE (HCC): Chronic | ICD-10-CM

## 2024-05-02 DIAGNOSIS — G32.81 CEREBELLAR ATAXIA IN DISEASES CLASSIFIED ELSEWHERE (HCC): Chronic | ICD-10-CM

## 2024-05-02 DIAGNOSIS — G82.50 QUADRIPARESIS (HCC): Primary | Chronic | ICD-10-CM

## 2024-05-02 PROCEDURE — 99309 SBSQ NF CARE MODERATE MDM 30: CPT | Performed by: INTERNAL MEDICINE

## 2024-05-02 NOTE — PROGRESS NOTES
MONTHLY VISIT  Location: [facility]  POS: [facility]    NAME: Tong Fletcher  AGE: 59 y.o. SEX: male    DATE OF ENCOUNTER: 5/2/2024    ASSESSMENT AND PROBLEMS:  1. Quadriparesis (HCC)  Assessment & Plan:  Stable.  Continue supportive care      2. Bipolar 1 disorder, depressed, moderate (HCC)  Assessment & Plan:  Stable.  Continue chlorpromazine, olanzapine, trazodone, valproic acid, duloxetine, clonazepam      3. 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  Continue assistance      4. Slow transit constipation  Assessment & Plan:  Stable.  Continue senna        CHIEF COMPLAINT:  Monthly Visit    HISTORY OF PRESENT ILLNESS:  History taken from patient.  He reports doing well.  Denies changes in mood, functional status, medical issues.    REVIEW OF SYSTEMS:  Complete ROS negative other than as stated in HPI    HISTORY:  Medical Hx: Reviewed, unchanged  Family Hx: Reviewed, unchanged  Soc Hx: Reviewed, unchanged  No Known Allergies    PHYSICAL EXAM:  Vital Signs: /80, temp 97, HR 81, RR 20, O2 96% on room air, weight 189.7 pounds  General: NAD, sitting in wheelchair  Eye Exam: anicteric sclera, no discharge  ENT: moist mucous membranes  Cardiovascular: regular rate, regular rhythm, no murmurs, rubs, or gallops  Pulmonary: no wheeze, no rhonchi  Abdominal: soft, nontender, nondistended, bowel sounds audible  Neurological: Awake, alert, poor coordination of all 4 extremities.  Skin: No significant lesions appreciated    PERTINENT LABS/IMAGING DATA:  4/25/2024: Hemoglobin 12.1, WBC 4.5, platelet 134, glucose 82, BUN 11, creatinine 0.65, sodium 130, potassium 3.8, AST 26, ALT 36    Provider: Henry Levine MD MPH  Patient: Tong Fletcher

## 2024-05-15 ENCOUNTER — OFFICE VISIT (OUTPATIENT)
Dept: CARDIOLOGY CLINIC | Facility: CLINIC | Age: 59
End: 2024-05-15
Payer: MEDICARE

## 2024-05-15 VITALS
DIASTOLIC BLOOD PRESSURE: 80 MMHG | SYSTOLIC BLOOD PRESSURE: 110 MMHG | BODY MASS INDEX: 23.97 KG/M2 | HEART RATE: 77 BPM | HEIGHT: 74 IN

## 2024-05-15 DIAGNOSIS — I50.32 CHRONIC DIASTOLIC HEART FAILURE (HCC): ICD-10-CM

## 2024-05-15 DIAGNOSIS — E78.5 DYSLIPIDEMIA: ICD-10-CM

## 2024-05-15 DIAGNOSIS — Z82.49 FAMILY HISTORY OF HEART DISEASE: ICD-10-CM

## 2024-05-15 DIAGNOSIS — R07.2 PRECORDIAL CHEST PAIN: Primary | ICD-10-CM

## 2024-05-15 PROCEDURE — 99214 OFFICE O/P EST MOD 30 MIN: CPT | Performed by: INTERNAL MEDICINE

## 2024-05-15 PROCEDURE — 93000 ELECTROCARDIOGRAM COMPLETE: CPT | Performed by: INTERNAL MEDICINE

## 2024-05-15 RX ORDER — GUAIFENESIN 400 MG/1
400 TABLET ORAL
COMMUNITY

## 2024-05-15 RX ORDER — MINERAL OIL AND PETROLATUM 150; 830 MG/G; MG/G
OINTMENT OPHTHALMIC
COMMUNITY

## 2024-05-15 NOTE — PROGRESS NOTES
Cardiology Office Note  MD Keny Bettencourt MD Jason Kaplan, DO, [facility]  MD Marcia Mario DO, [facility]  Elias Santiago DO, [facility]  ----------------------------------------------------------------  [address] [address]    Tong Fletcher 59 y.o. male MRN: [id number]  Unit/Bed#:  Encounter: [id number]      History of Present Illness:  It was a pleasure to see Tong Fletcher in the office today for follow-up CV evaluation. He has a past medical history of hypertension, quadriplegia, anoxic brain injury due to cardiopulmonary arrest from drug overdose, dysarthria and PVD.  He established care with us in March 2023. In February 2023, the patient had been thinking about his episode in 1993 where he ended up having an arrest with anoxic brain injury.  The episode gave him very significant anxiety which resulted in chest discomfort.  He was unclear that the chest discomfort was definitely related to his anxiety, but he did feel anxious during the episode.  It was described as a heaviness/tightness sensation in the chest. Following the episode, his symptoms resolved.  At baseline, the patient had restricted ambulation due to being wheelchair-bound.  Due to his symptoms, he was sent for stress test and echocardiogram in April 2023.  Stress test was found be negative for myocardial ischemia.  The echocardiogram demonstrated normal left ventricular systolic function with only mild valvular regurgitation.  Normal pulmonary pressures were noted.  Over the course of 2726-7326, the patient has been feeling well overall.  He did have a single upper respiratory infection, but aside from that in December 2023, the patient was feeling well.  He denies any further chest pain, pressure, tightness or squeezing.  Denies lower extremity swelling, orthopnea or paroxysmal nocturnal dyspnea.  Denies lightheadedness, dizziness or palpitations.    Review of  Systems:  Review of Systems   Constitutional: Negative for decreased appetite, fever, weight gain and weight loss.   HENT:  Negative for congestion and sore throat.    Eyes:  Negative for visual disturbance.   Cardiovascular:  Negative for chest pain, dyspnea on exertion, leg swelling, near-syncope and palpitations.   Respiratory:  Negative for cough and shortness of breath.    Hematologic/Lymphatic: Negative for bleeding problem.   Skin:  Negative for rash.   Musculoskeletal:  Negative for myalgias and neck pain.   Gastrointestinal:  Negative for abdominal pain and nausea.   Neurological:  Negative for light-headedness and weakness.   Psychiatric/Behavioral:  Negative for depression.        Past Medical History:   Diagnosis Date    Ataxia     Bipolar 1 disorder ([facility])     De Quervain's tenosynovitis, right 11/18/2020    Depression     Diplopia     Dysarthria     Hypertension     Insomnia     Onychomycosis     Optic atrophy     PVD (peripheral vascular disease) ([facility])     Quadriplegia ([facility])     Viral syndrome 12/14/2020       No past surgical history on file.    Social History     Socioeconomic History    Marital status: /Civil Union     Spouse name: Not on file    Number of children: Not on file    Years of education: Not on file    Highest education level: Not on file   Occupational History    Not on file   Tobacco Use    Smoking status: Never    Smokeless tobacco: Never   Substance and Sexual Activity    Alcohol use: Not Currently    Drug use: Never    Sexual activity: Not Currently   Other Topics Concern    Not on file   Social History Narrative    Not on file     Social Determinants of Health     Financial Resource Strain: Not on file   Food Insecurity: Not on file   Transportation Needs: Not on file   Physical Activity: Not on file   Stress: Not on file   Social Connections: Not on file   Intimate Partner Violence: Not on file   Housing Stability: Not on file       Family History   Problem Relation Age of  Onset    No Known Problems Mother     No Known Problems Father        No Known Allergies      Current Outpatient Medications:     acetaminophen (TYLENOL) 325 mg tablet, Take 650 mg by mouth every 4 (four) hours as needed for mild pain, Disp: , Rfl:     albuterol (2.5 mg/3 mL) 0.083 % nebulizer solution, Take 2.5 mg by nebulization every 6 (six) hours as needed, Disp: , Rfl:     aluminum-magnesium hydroxide 200-200 MG/5ML suspension, Take 10 mL by mouth 3 (three) times a day as needed, Disp: , Rfl:     chlorproMAZINE (THORAZINE) 200 mg tablet, Take 200 mg by mouth daily at bedtime, Disp: , Rfl:     clonazePAM (KlonoPIN) 0.5 mg tablet, Take 0.5 tablets (0.25 mg total) by mouth daily at bedtime, Disp: 30 tablet, Rfl: 5    diclofenac sodium (VOLTAREN) 1 %, Apply 2 g topically 3 (three) times a day as needed  Apply to Right hand pain, Disp: , Rfl:     DULoxetine (CYMBALTA) 60 mg delayed release capsule, Take 60 mg by mouth 2 (two) times a day, Disp: , Rfl:     guaiFENesin 400 mg, Take 400 mg by mouth every 4 (four) hours, Disp: , Rfl:     ibuprofen (MOTRIN) 100 mg/5 mL suspension, Take 20 mL by mouth every 4 (four) hours as needed, Disp: , Rfl:     ipratropium-albuterol (DUO-NEB) 0.5-2.5 mg/3 mL nebulizer solution, Take 3 mL by nebulization 4 (four) times a day, Disp: , Rfl:     melatonin 3 mg, Take 2 tablets by mouth daily at bedtime, Disp: , Rfl:     mirtazapine (REMERON) 30 mg tablet, Take 30 mg by mouth daily at bedtime, Disp: , Rfl:     Multiple Vitamins-Minerals (Multilex-T&M) TABS, Take 1 tablet by mouth daily, Disp: , Rfl:     OLANZapine (ZyPREXA) 10 mg tablet, Take 10 mg by mouth daily at bedtime, Disp: , Rfl:     senna (SENOKOT) 8.6 MG tablet, Take 1 tablet by mouth daily at bedtime  At bedtime, Disp: , Rfl:     traZODone (DESYREL) 150 mg tablet, Take 150 mg by mouth daily at bedtime, Disp: , Rfl:     valproic acid (DEPAKENE) 250 mg capsule, Take 750 mg by mouth 2 (two) times a day, Disp: , Rfl:     White  "Petrolatum-Mineral Oil (artificial tear) 83-15 % ophthalmic ointment, daily at bedtime, Disp: , Rfl:     polyvinyl alcohol (LIQUIFILM TEARS) 1.4 % ophthalmic solution, Administer 2 drops to both eyes 4 (four) times a day (Patient not taking: Reported on 5/15/2024), Disp: , Rfl:     Vitals:    05/15/24 1023   BP: 110/80   BP Location: Left arm   Patient Position: Sitting   Cuff Size: Adult   Pulse: 77   Height: 6' 2\" (1.88 m)       Body mass index is 23.97 kg/m².    PHYSICAL EXAMINATION:  Gen: Awake, Alert, NAD; in wheelchair  Head/eyes: AT/NC, pupils equal and round, Anicteric  ENT: mmm  Neck: Supple, No elevated JVP, trachea midline  Resp: CTA bilaterally no w/r/r  CV: RRR +S1, S2, No m/r/g  Abd: Soft, NT/ND + BS  Ext: no LE edema bilaterally  Neuro: Follows commands, moves all extermities  Psych: Appropriate affect, happy mood, pleasant attitude, non-combative  Skin: warm; no rash, erythema or venous stasis changes on exposed skin    --------------------------------------------------------------------------------  TREADMILL STRESS  No results found for this or any previous visit.     --------------------------------------------------------------------------------  NUCLEAR STRESS TEST: No results found for this or any previous visit.    No results found for this or any previous visit.      --------------------------------------------------------------------------------  CATH:  No results found for this or any previous visit.    --------------------------------------------------------------------------------  ECHO:   LVEF 55%, mild LVH, normal diastolic function, trace MR/TR/FL, April 2014  --------------------------------------------------------------------------------  HOLTER  No results found for this or any previous visit.    No results found for this or any previous visit.    --------------------------------------------------------------------------------  CAROTIDS  No results found for this or any previous " "visit.     --------------------------------------------------------------------------------  ECGs:  Results for orders placed or performed in visit on 05/15/24   POCT ECG    Impression    Sinus rhythm 77 bpm, normal ECG        Lab Results   Component Value Date    WBC 5.00 02/06/2023    HGB 13.2 02/06/2023    HCT 37.4 02/06/2023    MCV 84 02/06/2023     02/06/2023      Lab Results   Component Value Date    SODIUM 135 02/06/2023    K 3.6 02/06/2023    CL 99 02/06/2023    CO2 30 02/06/2023    BUN 13 02/06/2023    CREATININE 0.80 02/06/2023    GLUC 91 02/06/2023    CALCIUM 8.9 02/06/2023      No results found for: \"HGBA1C\"   No results found for: \"CHOL\"  No results found for: \"HDL\"  No results found for: \"LDLCALC\"  No results found for: \"TRIG\"  No results found for: \"CHOLHDL\"   No results found for: \"INR\", \"PROTIME\"     1. Precordial chest pain  2. Chronic diastolic heart failure (HCC)  -     POCT ECG  3. Dyslipidemia  4. Family history of heart disease        IMPRESSION:  Precordial chest pain  Pharmacologic nuclear stress test appears negative for myocardial ischemia, gated EF 73%, April 2023  Chronic diastolic heart failure  LVEF 63%, grade 1 diastolic dysfunction, mild TR with PASP 17 mmHg, April 2023  Cardiopulmonary arrest due to drug overdose, 1993  Type 2 MI likely due to heart failure s/p cath w/ normal coronary arteries, April 2014  Anoxic brain injury with ataxic quadriplegia and dysarthria, 1993  Dyslipidemia w/ LDL 99 mg/dL, HDL 44 mg/dL,  mg/dL, March 2023  Family history of CAD    PLAN:  It was a pleasure to see Lew in the office today for follow-up CV evaluation.  He is here today for routine CV follow-up.  Since her last encounter, he has been feeling well overall.  He has no chest pain concerning for angina and no signs or symptoms of heart failure.  Clinically he examines to be euvolemic.  Blood pressure and heart rate are currently stable.  He is tolerating his current medications " "without any reported adverse effects he can perform physical activity without significant exertional symptoms.  ECG is nonischemic.  Based on his clinical presentation, I have the following recommendations:    1.  Recommend 30 minutes a day, 5 days a week of moderate intensity activity to build cardiovascular endurance as tolerated.  2.  Would encourage a heart healthy diet low in sodium and carbohydrate.  3.  No changes to medications at this time.  4.  He is currently up-to-date with CV testing.  May consider repeat echocardiogram after April 2026.  5.  ASCVD risk 6.1%.  For now, patient is in gray zone for potential initiation of statin.  Recommend repeat lipid panel in the next year and we will reassess his ASCVD risk at that time.  6.  We will follow-up with him in 1 year to reassess his progress.    As always, please do not hesitate to call with any questions.    Portions of the record may have been created with voice recognition software. Occasional wrong word or \"sound a like\" substitutions may have occurred due to the inherent limitations of voice recognition software. Read the chart carefully and recognize, using context, where substitutions have occurred.      Signed: Romel Lea DO, FACC, FASE, FACP  "

## 2024-05-31 ENCOUNTER — NURSING HOME VISIT (OUTPATIENT)
Dept: GERIATRICS | Facility: OTHER | Age: 59
End: 2024-05-31
Payer: MEDICARE

## 2024-05-31 DIAGNOSIS — G82.50 QUADRIPARESIS (HCC): Chronic | ICD-10-CM

## 2024-05-31 DIAGNOSIS — F31.32 BIPOLAR 1 DISORDER, DEPRESSED, MODERATE (HCC): Chronic | ICD-10-CM

## 2024-05-31 DIAGNOSIS — E78.5 DYSLIPIDEMIA: ICD-10-CM

## 2024-05-31 DIAGNOSIS — M54.2 NECK AND SHOULDER PAIN: ICD-10-CM

## 2024-05-31 DIAGNOSIS — K59.01 SLOW TRANSIT CONSTIPATION: Chronic | ICD-10-CM

## 2024-05-31 DIAGNOSIS — E87.1 HYPONATREMIA: ICD-10-CM

## 2024-05-31 DIAGNOSIS — G32.81 CEREBELLAR ATAXIA IN DISEASES CLASSIFIED ELSEWHERE (HCC): Primary | Chronic | ICD-10-CM

## 2024-05-31 DIAGNOSIS — D69.6 THROMBOCYTOPENIA (HCC): ICD-10-CM

## 2024-05-31 DIAGNOSIS — G47.59 OTHER PARASOMNIA: ICD-10-CM

## 2024-05-31 DIAGNOSIS — M25.519 NECK AND SHOULDER PAIN: ICD-10-CM

## 2024-05-31 PROBLEM — B34.8 INFECTION DUE TO HUMAN METAPNEUMOVIRUS (HMPV): Status: RESOLVED | Noted: 2024-04-22 | Resolved: 2024-05-31

## 2024-05-31 PROCEDURE — 99309 SBSQ NF CARE MODERATE MDM 30: CPT

## 2024-05-31 NOTE — PROGRESS NOTES
"MONTHLY VISIT  Location: [facility]  POS: [address] [facility]    NAME: Tong Fletcher  AGE: 59 y.o. SEX: male    DATE OF ENCOUNTER: 5/31/2024    ASSESSMENT AND PROBLEMS:  1. 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 Bipolar 1 disorder, depressed, moderate (HCC)  Assessment & Plan:  Stable  Continue on Mirtazapine 30 mg QHS, duloxetine 60 mg BID, trazodone 150 mg QHS, chlorpromazine 200 mg Q HS, valproic acid 750 mg Q AM and QHS, clonazepam 0.25 mg QHS, and olanzapine 10 mg at dinnertime.   Continue with ongoing supportive care, monitor mood and behaviors  Check CBC, CMP, TSH, free T4, HA1C, vitamin D, in June  Continue to follow with psychology, has also followed with [facility] in the past as well   3. Other parasomnia  Assessment & Plan:  Stable  Mood stable   Continue sleep hygiene, melatonin, trazodone, mirtazapine, and zyprexa   4. Neck and shoulder pain  Assessment & Plan:  Stable, resolved  Continue following with PT  5. Slow transit constipation  Assessment & Plan:  Stable  Continue Senna 1 tab QHS  6. Thrombocytopenia (HCC)  Assessment & Plan:  Chronic, mild  Continue to monitor routine labs  7. Quadriparesis (HCC)  Assessment & Plan:  Stable  Continue supportive care at [facility]  8. Dyslipidemia  Assessment & Plan:  Stable  Following with cardiology, note reviewed:   \"ASCVD risk 6.1%.  For now, patient is in gray zone for potential initiation of statin.  Recommend repeat lipid panel in the next year and we will reassess his ASCVD risk at that time.\"    9. Hyponatremia  Assessment & Plan:  Stable, resolved   Na 138  Monitor routine labs               CHIEF COMPLAINT:  Monthly Visit    HISTORY OF PRESENT ILLNESS:  History taken from patient, staff, chart    Tong Fletcher is a 60 y/o male [facility] resident of [facility] with hx " including but not limited to quadriplegia, depression, bipolar, hypertension, and insomnia seen today for monthly visit, acute and chronic conditions..    Continues with ongoing supportive care at LTC in setting of quadriplegia and cerebellar ataxia. Continues to mobilize in manual wheelchair.    Neuromuscular status stable.  He denies any pain.  Reports he has been sleeping well.  Mood and appetite stable.   He denies any respiratory, , GI symptoms.  BMI, weight,  No current concerns from nursing        REVIEW OF SYSTEMS:  Complete ROS negative other than as stated in HPI    HISTORY:  Medical Hx: Reviewed, unchanged  Family Hx: Reviewed, unchanged  Soc Hx: Reviewed, unchanged  No Known Allergies    PHYSICAL EXAM:    Vital Signs: /80, temp 97, HR 81, RR 20, O2 96% on room air  Weight 196.2 pounds, BMI 23.9      General: NAD, sitting in wheelchair  Eye Exam: anicteric sclera, no discharge, EOMI intact  ENT: moist mucous membranes  Cardiovascular: regular rate, regular rhythm, no murmurs, rubs, or gallops  Pulmonary: CTA, unlabored, on RA  Abdominal: soft, nontender, nondistended, bowel sounds audible  Neurological: slurred speech. Awake, alert, engaged in conversation. Generalized weakness with poor coordination.  Able to actively move extremities.   Skin: No significant lesions appreciated, no significant rashes appreciated  Psych: somewhat flat, pleasant, cooperative    PERTINENT LABS/IMAGING DATA:    5/10 lipid panel: Cholesterol 170, triglyceride 124, HDL 43,     4/25 CBC, CMP: Hemoglobin 12.1, WBC 4.5, platelet 134, glucose 82, BUN 11, creatinine 0.65, sodium 138, potassium 3.8, AST 26, ALT 36, EGFR 108        4/24 HIP UNI W OR W/O PELVIS 2-3 V, RIGHT  FINDINGS: The right hip joint is in alignment, but there is narrowing of the joint space due to mild degenerative changes. There is mild degenerative spurring. No fracture or dislocation is seen. Pubic rami are intact.  CONCLUSION: Mild  osteoarthritis of the right hip.     4/21 CBC, BMP: Hemoglobin 12.6, WBC 4.7, platelet 133, glucose 100, BUN 17, creatinine 0.73, sodium 133, potassium 3.7, EGFR 104     4/19 viral panel: Positive human metapneumovirus     1/3/2024 CBC, CMP: Hemoglobin 12.8, WBC 5.6, platelet 249, glucose 85, BUN 22, creatinine 0.79, sodium 142, potassium 4.0, alk phos 30, albumin 3.4, total protein 6.0, AST 16, ALT 25, EGFR 102     12/23/2023: Influenza A positive      12/15/2023 CBC, CMP: Hemoglobin 13.1, WBC 5.5, platelet 147, glucose 78, BUN 22, creatinine 0.78, sodium 138, potassium 3.9, EGFR 102     10/3/2023 CBC, CMP: Hemoglobin 13.1, WBC 7.9, platelet 152, glucose 91, BUN 17, creatinine 0.85, sodium 140, potassium 3.9, alk phos 31, total protein 5.6, AST 13, ALT 19, EGFR 100         CURRENT MEDICATIONS:  All medications reviewed and updated in Nursing Home EMR.    Provider: Alexsander MEDINA  [facility]  Patient: Tong Fletcher   05/31/24

## 2024-05-31 NOTE — ASSESSMENT & PLAN NOTE
"Stable  Following with cardiology, note reviewed:   \"ASCVD risk 6.1%.  For now, patient is in gray zone for potential initiation of statin.  Recommend repeat lipid panel in the next year and we will reassess his ASCVD risk at that time.\"    "

## 2024-06-27 ENCOUNTER — NURSING HOME VISIT (OUTPATIENT)
Dept: GERIATRICS | Facility: OTHER | Age: 59
End: 2024-06-27

## 2024-06-27 DIAGNOSIS — F31.32 BIPOLAR 1 DISORDER, DEPRESSED, MODERATE (HCC): Chronic | ICD-10-CM

## 2024-06-27 DIAGNOSIS — E78.5 DYSLIPIDEMIA: ICD-10-CM

## 2024-06-27 DIAGNOSIS — G32.81 CEREBELLAR ATAXIA IN DISEASES CLASSIFIED ELSEWHERE (HCC): Primary | Chronic | ICD-10-CM

## 2024-06-27 DIAGNOSIS — K59.01 SLOW TRANSIT CONSTIPATION: Chronic | ICD-10-CM

## 2024-06-27 DIAGNOSIS — D69.6 THROMBOCYTOPENIA (HCC): ICD-10-CM

## 2024-06-27 DIAGNOSIS — G82.50 QUADRIPARESIS (HCC): Chronic | ICD-10-CM

## 2024-06-27 DIAGNOSIS — G47.59 OTHER PARASOMNIA: ICD-10-CM

## 2024-06-27 PROBLEM — M25.519 NECK AND SHOULDER PAIN: Status: RESOLVED | Noted: 2024-04-04 | Resolved: 2024-06-27

## 2024-06-27 PROBLEM — M25.551 RIGHT HIP PAIN: Status: RESOLVED | Noted: 2024-04-26 | Resolved: 2024-06-27

## 2024-06-27 PROBLEM — M54.2 NECK AND SHOULDER PAIN: Status: RESOLVED | Noted: 2024-04-04 | Resolved: 2024-06-27

## 2024-06-27 NOTE — ASSESSMENT & PLAN NOTE
Stable  Following with cardiology with repeat lipid panel in the next year and reassess ASCVD risk

## 2024-06-27 NOTE — ASSESSMENT & PLAN NOTE
Stable  Continue on Mirtazapine 30 mg QHS, duloxetine 60 mg BID, trazodone 150 mg QHS, chlorpromazine 200 mg Q HS, valproic acid 750 mg Q AM and QHS, clonazepam 0.25 mg QHS, and olanzapine 10 mg at dinnertime.   Continue with ongoing supportive care, monitor mood and behaviors  Lab work this month stable  Continue to follow with psychology  Continue with ongoing supportive care, monitor mood and behaviors

## 2024-06-27 NOTE — PROGRESS NOTES
MONTHLY VISIT  Location: [facility]  POS: 32 [facility]    NAME: Tong Fletcher  AGE: 59 y.o. SEX: male    DATE OF ENCOUNTER: 6/27/2024    ASSESSMENT AND PROBLEMS:  1. 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 Bipolar 1 disorder, depressed, moderate ([facility])  Assessment & Plan:  Stable  Continue on Mirtazapine 30 mg QHS, duloxetine 60 mg BID, trazodone 150 mg QHS, chlorpromazine 200 mg Q HS, valproic acid 750 mg Q AM and QHS, clonazepam 0.25 mg QHS, and olanzapine 10 mg at dinnertime.   Continue with ongoing supportive care, monitor mood and behaviors  Lab work this month stable  Continue to follow with psychology  Continue with ongoing supportive care, monitor mood and behaviors  3. Quadriparesis ([facility])  Assessment & Plan:  Stable  Continue supportive care at [facility]  4. Other parasomnia  Assessment & Plan:  Stable  Mood stable   Continue sleep hygiene, melatonin, trazodone, mirtazapine, and zyprexa   5. Slow transit constipation  Assessment & Plan:  Stable  Continue Senna 1 tab QHS  6. Thrombocytopenia ([facility])  Assessment & Plan:  Stable  Chronic, mild- 134  Continue to monitor routine labs  7. Dyslipidemia  Assessment & Plan:  Stable  Following with cardiology with repeat lipid panel in the next year and reassess ASCVD risk             CHIEF COMPLAINT:    Monthly Visit - 60 Day    HISTORY OF PRESENT ILLNESS:  History taken from patient, staff, chart    Tong Fletcher is a 60 y/o male [facility] resident of [facility] with hx including but not limited to quadriplegia, depression, bipolar, hypertension, and insomnia seen today for monthly visit, acute and chronic conditions..    Continues with ongoing supportive care at [facility] in setting of quadriplegia and cerebellar ataxia. Continues to mobilize in manual wheelchair.    Neuromuscular status stable.   He denies any pain.  He reports stable mood, appetite, sleep.  He denies any respiratory, , GI symptoms.  BMI, weight, stable.   No current concerns from nursing        REVIEW OF SYSTEMS:  Complete ROS negative other than as stated in HPI    HISTORY:  Medical Hx: Reviewed, unchanged  Family Hx: Reviewed, unchanged  Soc Hx: Reviewed, unchanged  No Known Allergies    PHYSICAL EXAM:    Vital Signs: /75, temp 97.9, HR 68, RR 18, O2 96% on room air  Weight 190.2 pounds, BMI 24.4      General: NAD, sitting in wheelchair  Eye Exam: anicteric sclera, no discharge, EOMI intact  ENT: moist mucous membranes  Cardiovascular: regular rate, regular rhythm, no murmurs, rubs, or gallops  Pulmonary: CTA, unlabored, on RA  Abdominal: soft, nontender, nondistended, bowel sounds audible  Neurological: slurred speech. Awake, alert, engaged in conversation. Generalized weakness with poor coordination.  Able to actively move extremities.   Skin: No significant lesions appreciated, no significant rashes appreciated  Psych: somewhat flat, pleasant, cooperative    PERTINENT LABS/IMAGING DATA:    6/4 CBC, CMP: Hemoglobin 13.2, WBC 4.4, platelet 164, glucose 86, BUN 17, creatinine 0.74, sodium 140, potassium 4.0, AST 20, ALT 21, EGFR 104    6/3 TSH: 2.53  Free T4: 0.93  Vitamin D: 46  Hemoglobin A1c: 5.0    5/10 lipid panel: Cholesterol 170, triglyceride 124, HDL 43,     4/25 CBC, CMP: Hemoglobin 12.1, WBC 4.5, platelet 134, glucose 82, BUN 11, creatinine 0.65, sodium 138, potassium 3.8, AST 26, ALT 36, EGFR 108        4/24 HIP UNI W OR W/O PELVIS 2-3 V, RIGHT  FINDINGS: The right hip joint is in alignment, but there is narrowing of the joint space due to mild degenerative changes. There is mild degenerative spurring. No fracture or dislocation is seen. Pubic rami are intact.  CONCLUSION: Mild osteoarthritis of the right hip.     4/21 CBC, BMP: Hemoglobin 12.6, WBC 4.7, platelet 133, glucose 100, BUN 17, creatinine 0.73, sodium  133, potassium 3.7, EGFR 104     4/19 viral panel: Positive human metapneumovirus     1/3/2024 CBC, CMP: Hemoglobin 12.8, WBC 5.6, platelet 249, glucose 85, BUN 22, creatinine 0.79, sodium 142, potassium 4.0, alk phos 30, albumin 3.4, total protein 6.0, AST 16, ALT 25, EGFR 102     12/23/2023: Influenza A positive      12/15/2023 CBC, CMP: Hemoglobin 13.1, WBC 5.5, platelet 147, glucose 78, BUN 22, creatinine 0.78, sodium 138, potassium 3.9, EGFR 102     10/3/2023 CBC, CMP: Hemoglobin 13.1, WBC 7.9, platelet 152, glucose 91, BUN 17, creatinine 0.85, sodium 140, potassium 3.9, alk phos 31, total protein 5.6, AST 13, ALT 19, EGFR 100         CURRENT MEDICATIONS:  All medications reviewed and updated in Nursing Home EMR.    Provider: Alexsander MEDINA  [facility]  Patient: Tong Fletcher   06/27/24

## 2024-07-23 ENCOUNTER — NURSING HOME VISIT (OUTPATIENT)
Dept: GERIATRICS | Facility: OTHER | Age: 59
End: 2024-07-23
Payer: MEDICARE

## 2024-07-23 DIAGNOSIS — G47.59 OTHER PARASOMNIA: ICD-10-CM

## 2024-07-23 DIAGNOSIS — F31.32 BIPOLAR 1 DISORDER, DEPRESSED, MODERATE (HCC): Chronic | ICD-10-CM

## 2024-07-23 DIAGNOSIS — D69.6 THROMBOCYTOPENIA (HCC): ICD-10-CM

## 2024-07-23 DIAGNOSIS — E78.5 DYSLIPIDEMIA: ICD-10-CM

## 2024-07-23 DIAGNOSIS — G32.81 CEREBELLAR ATAXIA IN DISEASES CLASSIFIED ELSEWHERE (HCC): Primary | Chronic | ICD-10-CM

## 2024-07-23 DIAGNOSIS — K59.01 SLOW TRANSIT CONSTIPATION: Chronic | ICD-10-CM

## 2024-07-23 PROCEDURE — 99309 SBSQ NF CARE MODERATE MDM 30: CPT

## 2024-07-23 NOTE — PROGRESS NOTES
MONTHLY VISIT  Location: [facility]  POS: 32 [facility]    NAME: Tong Fletcher  AGE: 59 y.o. SEX: male    DATE OF ENCOUNTER: 7/24/2024    ASSESSMENT AND PROBLEMS:  1. 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 Bipolar 1 disorder, depressed, moderate ([facility])  Assessment & Plan:  Stable  Continue on Mirtazapine 30 mg QHS, duloxetine 60 mg BID, trazodone 150 mg QHS, chlorpromazine 200 mg Q HS, valproic acid 750 mg Q AM and QHS, clonazepam 0.25 mg QHS, and olanzapine 10 mg at dinnertime.   Continue with ongoing supportive care, monitor mood and behaviors  Continue to follow with psychology  Continue with ongoing supportive care, monitor mood and behaviors  Continue to follow labs  3. Thrombocytopenia (HCC)  Assessment & Plan:  Stable  Chronic, mild  Continue to monitor routine labs  4. Slow transit constipation  Assessment & Plan:  Stable  Continue Senna 1 tab QHS  5. Other parasomnia  Assessment & Plan:  Stable  Mood stable   Remains awake and alert during the day and able to function  Continue sleep hygiene, melatonin, trazodone, mirtazapine, and zyprexa   6. Dyslipidemia  Assessment & Plan:  Stable  Following with cardiology with repeat lipid panel in the next year and reassess ASCVD risk        CHIEF COMPLAINT:    Monthly Visit     HISTORY OF PRESENT ILLNESS:  History taken from patient, staff, chart    Tong Fletcher is a 58 y/o male LT resident of [facility] with hx including but not limited to quadriplegia, depression, bipolar, hypertension, and insomnia seen today for monthly visit, acute and chronic conditions..    Continues with ongoing supportive care at [facility] in setting of quadriplegia and cerebellar ataxia. Continues to mobilize in manual wheelchair.    Neuromuscular status stable.  He denies any  pain.  Reports he is doing well, offers no complaints.  He reports stable mood, appetite, sleep.  He denies any respiratory, , GI symptoms.  BMI, weight, stable.   No current concerns from nursing.  Recent vital signs stable.         REVIEW OF SYSTEMS:  Complete ROS negative other than as stated in HPI    HISTORY:  Medical Hx: Reviewed, unchanged  Family Hx: Reviewed, unchanged  Soc Hx: Reviewed, unchanged  No Known Allergies    PHYSICAL EXAM:    Vital Signs: /78, temp 97.4, HR 79, RR 20, O2 96% on room air  Weight 191.3 pounds, BMI 24.6    General: NAD, sitting in wheelchair  Eye Exam: anicteric sclera, no discharge, EOMI intact  ENT: moist mucous membranes  Cardiovascular: regular rate, regular rhythm, no murmurs, rubs, or gallops  Pulmonary: CTA, unlabored, on RA  Abdominal: soft, nontender, nondistended, bowel sounds audible  Neurological: slurred speech. Awake, alert, engaged in conversation. Generalized weakness with poor coordination.  Able to actively move extremities.   Skin: No significant lesions appreciated, no significant rashes appreciated  Psych: somewhat flat, pleasant, cooperative    PERTINENT LABS/IMAGING DATA:    6/4 CBC, CMP: Hemoglobin 13.2, WBC 4.4, platelet 164, glucose 86, BUN 17, creatinine 0.74, sodium 140, potassium 4.0, AST 20, ALT 21, EGFR 104    6/3 TSH: 2.53  Free T4: 0.93  Vitamin D: 46  Hemoglobin A1c: 5.0    5/10 lipid panel: Cholesterol 170, triglyceride 124, HDL 43,     4/25 CBC, CMP: Hemoglobin 12.1, WBC 4.5, platelet 134, glucose 82, BUN 11, creatinine 0.65, sodium 138, potassium 3.8, AST 26, ALT 36, EGFR 108        4/24 HIP UNI W OR W/O PELVIS 2-3 V, RIGHT  FINDINGS: The right hip joint is in alignment, but there is narrowing of the joint space due to mild degenerative changes. There is mild degenerative spurring. No fracture or dislocation is seen. Pubic rami are intact.  CONCLUSION: Mild osteoarthritis of the right hip.     4/21 CBC, BMP: Hemoglobin 12.6, WBC  4.7, platelet 133, glucose 100, BUN 17, creatinine 0.73, sodium 133, potassium 3.7, EGFR 104     4/19 viral panel: Positive human metapneumovirus     1/3/2024 CBC, CMP: Hemoglobin 12.8, WBC 5.6, platelet 249, glucose 85, BUN 22, creatinine 0.79, sodium 142, potassium 4.0, alk phos 30, albumin 3.4, total protein 6.0, AST 16, ALT 25, EGFR 102     12/23/2023: Influenza A positive      12/15/2023 CBC, CMP: Hemoglobin 13.1, WBC 5.5, platelet 147, glucose 78, BUN 22, creatinine 0.78, sodium 138, potassium 3.9, EGFR 102     10/3/2023 CBC, CMP: Hemoglobin 13.1, WBC 7.9, platelet 152, glucose 91, BUN 17, creatinine 0.85, sodium 140, potassium 3.9, alk phos 31, total protein 5.6, AST 13, ALT 19, EGFR 100         CURRENT MEDICATIONS:  All medications reviewed and updated in Nursing Home EMR.    Provider: Alexsander MEDINA  [facility]  Patient: Tong Fletcher   07/23/24

## 2024-07-24 NOTE — ASSESSMENT & PLAN NOTE
Stable  Continue on Mirtazapine 30 mg QHS, duloxetine 60 mg BID, trazodone 150 mg QHS, chlorpromazine 200 mg Q HS, valproic acid 750 mg Q AM and QHS, clonazepam 0.25 mg QHS, and olanzapine 10 mg at dinnertime.   Continue with ongoing supportive care, monitor mood and behaviors  Continue to follow with psychology  Continue with ongoing supportive care, monitor mood and behaviors  Continue to follow labs

## 2024-07-24 NOTE — ASSESSMENT & PLAN NOTE
Stable  Continues to self propel in wheelchair  Continue ADL assistance, physical therapy, and ongoing supportive care at [facility] with fall precautions

## 2024-07-24 NOTE — ASSESSMENT & PLAN NOTE
Stable  Mood stable   Remains awake and alert during the day and able to function  Continue sleep hygiene, melatonin, trazodone, mirtazapine, and zyprexa

## 2024-07-24 NOTE — ASSESSMENT & PLAN NOTE
Stable  Following with cardiology with repeat lipid panel in the next year and reassess ASCVD risk

## 2024-08-29 ENCOUNTER — NURSING HOME VISIT (OUTPATIENT)
Dept: GERIATRICS | Facility: OTHER | Age: 59
End: 2024-08-29
Payer: MEDICARE

## 2024-08-29 DIAGNOSIS — G82.50 QUADRIPARESIS (HCC): Primary | Chronic | ICD-10-CM

## 2024-08-29 DIAGNOSIS — F31.32 BIPOLAR 1 DISORDER, DEPRESSED, MODERATE (HCC): Chronic | ICD-10-CM

## 2024-08-29 DIAGNOSIS — K59.01 SLOW TRANSIT CONSTIPATION: Chronic | ICD-10-CM

## 2024-08-29 DIAGNOSIS — G32.81 CEREBELLAR ATAXIA IN DISEASES CLASSIFIED ELSEWHERE (HCC): Chronic | ICD-10-CM

## 2024-08-29 PROCEDURE — 99309 SBSQ NF CARE MODERATE MDM 30: CPT | Performed by: INTERNAL MEDICINE

## 2024-08-29 NOTE — PROGRESS NOTES
MONTHLY VISIT  Location: [facility]  POS: [facility]    NAME: Tong Fletcher  AGE: 59 y.o. SEX: male    DATE OF ENCOUNTER: 8/29/2024    ASSESSMENT AND PROBLEMS:  1. Quadriparesis ([facility])  Assessment & Plan:  Ataxic quadriparesis.  Continue supportive care.  2. Bipolar 1 disorder, depressed, moderate ([facility])  Assessment & Plan:  Stable.  Continue chlorpromazine, olanzapine, trazodone, valproic acid, duloxetine, clonazepam  3. 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  Continue supportive care  4. Slow transit constipation  Assessment & Plan:  Stable.  Continue senna nightly    CHIEF COMPLAINT:  Monthly Visit    HISTORY OF PRESENT ILLNESS:  History taken from patient.  He reports doing well overall without changes.  Nurses report stable neuromuscular status mood and behaviors as well    REVIEW OF SYSTEMS:  Complete ROS negative other than as stated in HPI    HISTORY:  Medical Hx: Reviewed, unchanged  Family Hx: Reviewed, unchanged  Soc Hx: Reviewed, unchanged  No Known Allergies    PHYSICAL EXAM:  Vital Signs: /79, temp 97.6, HR 87, RR 18, O2 97% room air  General: NAD, sitting in wheelchair  Eye Exam: anicteric sclera, no discharge  ENT: moist mucous membranes  Cardiovascular: regular rate, regular rhythm, no murmurs, rubs, or gallops  Pulmonary: no wheeze, no rhonchi  Abdominal: soft, nontender, nondistended, bowel sounds audible  Neurological: Awake, alert, poor coronation of all 4 extremities  Skin: No significant lesions appreciated    PERTINENT LABS/IMAGING DATA:  6/4/2024: Hemoglobin 13.2, WBC 4.4, platelet 164, creatinine 0.74, sodium 140, potassium 4.0    Provider: Henry Levine MD MPH  Patient: Tong Fletcher

## 2024-09-18 ENCOUNTER — NURSING HOME VISIT (OUTPATIENT)
Dept: GERIATRICS | Facility: OTHER | Age: 59
End: 2024-09-18
Payer: MEDICARE

## 2024-09-18 DIAGNOSIS — G44.229 CHRONIC TENSION-TYPE HEADACHE, NOT INTRACTABLE: Primary | ICD-10-CM

## 2024-09-18 DIAGNOSIS — G47.59 OTHER PARASOMNIA: ICD-10-CM

## 2024-09-18 PROBLEM — Z20.822 CLOSE EXPOSURE TO COVID-19 VIRUS: Status: ACTIVE | Noted: 2024-09-18

## 2024-09-18 PROBLEM — Z20.822 CLOSE EXPOSURE TO COVID-19 VIRUS: Status: RESOLVED | Noted: 2024-09-18 | Resolved: 2024-09-18

## 2024-09-18 PROCEDURE — 99308 SBSQ NF CARE LOW MDM 20: CPT

## 2024-09-18 NOTE — ASSESSMENT & PLAN NOTE
Acute on chronic   Chronic visual disturbance may be playing a role  Hx of eye patch in the past due to double vision, will touch base with OT  Continue PRN Ibuprofen PRN Tylenol also available  Monitor for acute neurological changes

## 2024-09-18 NOTE — PROGRESS NOTES
ACUTE VISIT  Location: [facility]  POS: [address] [facility]    NAME: Tong Fletcher  AGE: 59 y.o. SEX: male    DATE OF ENCOUNTER: 9/18/2024    ASSESSMENT AND PROBLEMS:  1. Chronic tension-type headache, not intractable  Assessment & Plan:  Acute on chronic   Chronic visual disturbance may be playing a role  Hx of eye patch in the past due to double vision, will touch base with OT  Continue PRN Ibuprofen PRN Tylenol also available  Monitor for acute neurological changes      2. Other parasomnia  Assessment & Plan:  Reports recent difficulty with maintaining sleep, reports he wakes up at night to urine, does admit to drinking fluids later in the evening  Discussed limiting fluids two hours or so before bed   Continue with melatonin, trazodone, mirtazapine, and zyprexa - mood remains stable without daytime fatigue  Continue to monitor syptoms          CHIEF COMPLAINT:    Headache, sleep    HISTORY OF PRESENT ILLNESS:  History taken from patient, staff, chart    Tong Fletcher is a 60 y/o male [facility] resident of [facility] with hx including but not limited to quadriplegia, depression, bipolar, hypertension, and insomnia seen today per staff request for increase in headaches.    Nursing reports recent increase in headaches.  He does have known hx of headaches with fluctuating status. Reports headaches are located in forehead and occur approx 3x/week, reports PRN Ibuprofen has been helpful.  He reports chronic diplopia, somewhat more bothersome recently, thinks it may be contributing to headaches.  He denies any nausea, confusion, fever, fatigue, or chills. He does report recent difficulty sleeping.  He reports that he has been waking up at night to urinate however thinking it may be due to drinking fluids later in evening/night.  Denies any urinary symptoms.   Nursing otherwise reports stable status.         REVIEW OF SYSTEMS:  Complete ROS negative other than as stated in HPI    HISTORY:  Medical Hx:  Reviewed, unchanged  Family Hx: Reviewed, unchanged  Soc Hx: Reviewed, unchanged  No Known Allergies    PHYSICAL EXAM:    Vital Signs: /75, temp 97.6, HR 80, RR 18, O2 100% on room air    General: NAD, sitting in wheelchair  Eye Exam: anicteric sclera, no discharge, EOMI intact  Pulmonary: unlabored, on RA  Neurological: Awake, alert, engaged in conversation. Baseline dysarthria. Generalized weakness with poor coordination.  Able to actively move extremities.   Skin: No significant lesions appreciated, no significant rashes appreciated  Psych: somewhat flat, pleasant, cooperative    PERTINENT LABS/IMAGING DATA:    6/4 CBC, CMP: Hemoglobin 13.2, WBC 4.4, platelet 164, glucose 86, BUN 17, creatinine 0.74, sodium 140, potassium 4.0, AST 20, ALT 21, EGFR 104    6/3 TSH: 2.53  Free T4: 0.93  Vitamin D: 46  Hemoglobin A1c: 5.0    5/10 lipid panel: Cholesterol 170, triglyceride 124, HDL 43,     4/25 CBC, CMP: Hemoglobin 12.1, WBC 4.5, platelet 134, glucose 82, BUN 11, creatinine 0.65, sodium 138, potassium 3.8, AST 26, ALT 36, EGFR 108        4/24 HIP UNI W OR W/O PELVIS 2-3 V, RIGHT  FINDINGS: The right hip joint is in alignment, but there is narrowing of the joint space due to mild degenerative changes. There is mild degenerative spurring. No fracture or dislocation is seen. Pubic rami are intact.  CONCLUSION: Mild osteoarthritis of the right hip.     4/21 CBC, BMP: Hemoglobin 12.6, WBC 4.7, platelet 133, glucose 100, BUN 17, creatinine 0.73, sodium 133, potassium 3.7, EGFR 104     4/19 viral panel: Positive human metapneumovirus     1/3/2024 CBC, CMP: Hemoglobin 12.8, WBC 5.6, platelet 249, glucose 85, BUN 22, creatinine 0.79, sodium 142, potassium 4.0, alk phos 30, albumin 3.4, total protein 6.0, AST 16, ALT 25, EGFR 102     12/23/2023: Influenza A positive      12/15/2023 CBC, CMP: Hemoglobin 13.1, WBC 5.5, platelet 147, glucose 78, BUN 22, creatinine 0.78, sodium 138, potassium 3.9, EGFR 102      10/3/2023 CBC, CMP: Hemoglobin 13.1, WBC 7.9, platelet 152, glucose 91, BUN 17, creatinine 0.85, sodium 140, potassium 3.9, alk phos 31, total protein 5.6, AST 13, ALT 19, EGFR 100         CURRENT MEDICATIONS:  All medications reviewed and updated in Nursing Home EMR.    Provider: Alexsander MEDINA  [facility]  Patient: Tong Fletcher   09/18/24

## 2024-09-19 NOTE — ASSESSMENT & PLAN NOTE
Reports recent difficulty with maintaining sleep, reports he wakes up at night to urine, does admit to drinking fluids later in the evening  Discussed limiting fluids two hours or so before bed   Continue with melatonin, trazodone, mirtazapine, and zyprexa - mood remains stable without daytime fatigue  Continue to monitor syptoms

## 2024-10-28 ENCOUNTER — NURSING HOME VISIT (OUTPATIENT)
Dept: GERIATRICS | Facility: OTHER | Age: 59
End: 2024-10-28
Payer: MEDICARE

## 2024-10-28 DIAGNOSIS — K59.01 SLOW TRANSIT CONSTIPATION: Chronic | ICD-10-CM

## 2024-10-28 DIAGNOSIS — F31.32 BIPOLAR 1 DISORDER, DEPRESSED, MODERATE (HCC): Chronic | ICD-10-CM

## 2024-10-28 DIAGNOSIS — G32.81 CEREBELLAR ATAXIA IN DISEASES CLASSIFIED ELSEWHERE (HCC): Chronic | ICD-10-CM

## 2024-10-28 DIAGNOSIS — G82.50 QUADRIPARESIS (HCC): Primary | Chronic | ICD-10-CM

## 2024-10-28 PROBLEM — R29.6 FALLS: Status: RESOLVED | Noted: 2023-12-26 | Resolved: 2024-10-28

## 2024-10-28 PROCEDURE — 99309 SBSQ NF CARE MODERATE MDM 30: CPT | Performed by: INTERNAL MEDICINE

## 2024-10-28 NOTE — PROGRESS NOTES
MONTHLY VISIT  Location: [facility]  POS: [facility]    NAME: Tong Fletcher  AGE: 59 y.o. SEX: male    DATE OF ENCOUNTER: 10/28/2024    ASSESSMENT AND PROBLEMS:  1. Quadriparesis (HCC)  Assessment & Plan:  Stable.  Continue supportive care  2. Bipolar 1 disorder, depressed, moderate (HCC)  Assessment & Plan:  Stable.  Continue chlorpromazine, olanzapine, trazodone, valproic acid, duloxetine, clonazepam  3. 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  Continue supportive care  4. Slow transit constipation  Assessment & Plan:  Stable.  Continue senna    CHIEF COMPLAINT:  Monthly Visit    HISTORY OF PRESENT ILLNESS:  History taken from patient.  He reports doing well.  Reports sleeping better without changes in medical status.    REVIEW OF SYSTEMS:  Complete ROS negative other than as stated in HPI    HISTORY:  Medical Hx: Reviewed, unchanged  Family Hx: Reviewed, unchanged  Soc Hx: Reviewed, unchanged  No Known Allergies    PHYSICAL EXAM:  Vital Signs: /62, temp 98, HR 77, RR 18, O2 96% room air, weight 195.1 pounds  General: NAD sitting in chair  Eye Exam: anicteric sclera, no discharge  ENT: moist mucous membranes  Cardiovascular: regular rate, regular rhythm, no murmurs, rubs, or gallops  Pulmonary: no wheeze, no rhonchi  Abdominal: soft, nontender, nondistended, bowel sounds audible  Neurological: Awake, alert, poor coronation of all 4 extremities.  Skin: No significant lesions appreciated    PERTINENT LABS/IMAGING DATA:  2024: Hemoglobin 13.2, WBC 4.4, platelet 164, glucose 86, BUN 17, creatinine 0.74, sodium 141 potassium 4.0, AST 20, ALT 21    Provider: Henry Levine MD MPH  Patient: Tong Fletcher

## 2024-11-04 ENCOUNTER — NURSING HOME VISIT (OUTPATIENT)
Dept: GERIATRICS | Facility: OTHER | Age: 59
End: 2024-11-04
Payer: MEDICARE

## 2024-11-04 DIAGNOSIS — K21.9 GASTROESOPHAGEAL REFLUX DISEASE WITHOUT ESOPHAGITIS: ICD-10-CM
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
## 2024-11-04 PROCEDURE — 99308 SBSQ NF CARE LOW MDM 20: CPT

## 2024-11-04 NOTE — PROGRESS NOTES
"ACUTE VISIT  Location: [facility]  POS: 32 [facility]    NAME: Tong Fletcher  AGE: 59 y.o. SEX: male    DATE OF ENCOUNTER: 11/4/2024    ASSESSMENT AND PROBLEMS:  1. Fall from wheelchair, initial encounter  Assessment & Plan:  Multifactorial  Continue to monitor sleep trends  Continue education regarding supervised transfers  Spoke with PT, they will assess wheelchair and reports of progressive weakness   Will also check CBC, BMP tomorrow 11/5  Continue post fall protocol   2. Gastroesophageal reflux disease without esophagitis  Assessment & Plan:  With acute reported symptoms last night/early this morning relieved by PRN antacid  Hx of PPI use  Continue to monitor symptoms, continue PRN antacid            CHIEF COMPLAINT:    Fall    HISTORY OF PRESENT ILLNESS:  History taken from patient, staff, chart    Tong Fletcher is a 58 y/o male LT resident of [facility] with hx including but not limited to quadriplegia, depression, bipolar, hypertension, and insomnia seen today s/p fall.     Patient seen today for reports of sliding down during his transfer by himself from bed to wheelchair.  Nursing reports patient was found in sitting position on the floor, patient denied headstrike or injury. Vitals and neuro status at baseline.   Upon assessment, patient seen sitting in WC, awake, alert, NAD.   He denies any pain.  He reports that he was \"half asleep\" while transfering himself as well as issue with wheelchair brake when the fall occurred. He reports having acute \"heart burn\" last night which made sleeping difficult. Reports taking PRN antacid and had positive effect.  He does report a degree of progressive weakness of LEs. He denies any fever, fatigue, chills, cough, SOB, lightheadedness, dizziness, or CP.   Continues to mobilize in manual wheelchair.       REVIEW OF SYSTEMS:  Complete ROS negative other than as stated in HPI    HISTORY:  Medical Hx: Reviewed, unchanged  Family Hx: Reviewed, " unchanged  Soc Hx: Reviewed, unchanged  No Known Allergies    PHYSICAL EXAM:    Vital Signs: /66, temp 97.6, HR 82, RR 16, O2 97% on room air    General: NAD, sitting in wheelchair  Eye Exam: anicteric sclera, no discharge, EOMI intact  Pulmonary: CTA, unlabored, on RA  Neurological: Awake, alert, engaged in conversation. Baseline dysarthria. Generalized weakness with poor coordination.  Able to actively move extremities.   Skin: No significant lesions appreciated, no significant rashes appreciated  Psych: somewhat flat, pleasant, cooperative    PERTINENT LABS/IMAGING DATA:    6/4 CBC, CMP: Hemoglobin 13.2, WBC 4.4, platelet 164, glucose 86, BUN 17, creatinine 0.74, sodium 140, potassium 4.0, AST 20, ALT 21, EGFR 104    6/3 TSH: 2.53  Free T4: 0.93  Vitamin D: 46  Hemoglobin A1c: 5.0    5/10 lipid panel: Cholesterol 170, triglyceride 124, HDL 43,     4/25 CBC, CMP: Hemoglobin 12.1, WBC 4.5, platelet 134, glucose 82, BUN 11, creatinine 0.65, sodium 138, potassium 3.8, AST 26, ALT 36, EGFR 108        4/24 HIP UNI W OR W/O PELVIS 2-3 V, RIGHT  FINDINGS: The right hip joint is in alignment, but there is narrowing of the joint space due to mild degenerative changes. There is mild degenerative spurring. No fracture or dislocation is seen. Pubic rami are intact.  CONCLUSION: Mild osteoarthritis of the right hip.     4/21 CBC, BMP: Hemoglobin 12.6, WBC 4.7, platelet 133, glucose 100, BUN 17, creatinine 0.73, sodium 133, potassium 3.7, EGFR 104     4/19 viral panel: Positive human metapneumovirus     1/3/2024 CBC, CMP: Hemoglobin 12.8, WBC 5.6, platelet 249, glucose 85, BUN 22, creatinine 0.79, sodium 142, potassium 4.0, alk phos 30, albumin 3.4, total protein 6.0, AST 16, ALT 25, EGFR 102     12/23/2023: Influenza A positive      12/15/2023 CBC, CMP: Hemoglobin 13.1, WBC 5.5, platelet 147, glucose 78, BUN 22, creatinine 0.78, sodium 138, potassium 3.9, EGFR 102     10/3/2023 CBC, CMP: Hemoglobin 13.1, WBC 7.9,  platelet 152, glucose 91, BUN 17, creatinine 0.85, sodium 140, potassium 3.9, alk phos 31, total protein 5.6, AST 13, ALT 19, EGFR 100         CURRENT MEDICATIONS:  All medications reviewed and updated in Nursing Home EMR.    Provider: Alexsander MEDINA  [facility]  Patient: Tong Fletcher   11/04/24

## 2024-11-04 NOTE — ASSESSMENT & PLAN NOTE
Multifactorial  Continue to monitor sleep trends  Continue education regarding supervised transfers  Spoke with PT, they will assess wheelchair and reports of progressive weakness   Will also check CBC, BMP tomorrow 11/5  Continue post fall protocol

## 2024-11-04 NOTE — ASSESSMENT & PLAN NOTE
With acute reported symptoms last night/early this morning relieved by PRN antacid  Hx of PPI use  Continue to monitor symptoms, continue PRN antacid

## 2024-11-20 ENCOUNTER — NURSING HOME VISIT (OUTPATIENT)
Dept: GERIATRICS | Facility: OTHER | Age: 59
End: 2024-11-20
Payer: MEDICARE

## 2024-11-20 DIAGNOSIS — G32.81 CEREBELLAR ATAXIA IN DISEASES CLASSIFIED ELSEWHERE (HCC): Chronic | ICD-10-CM

## 2024-11-20 DIAGNOSIS — R53.1 WEAKNESS GENERALIZED: Primary | ICD-10-CM

## 2024-11-20 PROCEDURE — 99308 SBSQ NF CARE LOW MDM 20: CPT

## 2024-11-20 NOTE — PROGRESS NOTES
ACUTE VISIT  Location: [facility]  POS: 32 LTC    NAME: Tong Fletcher  AGE: 59 y.o. SEX: male    DATE OF ENCOUNTER: 11/20/2024    ASSESSMENT AND PROBLEMS:  1. Weakness generalized  Assessment & Plan:  Concern for possible infectious etiology  Check comprehensive respiratory viral panel as well as CBC, CMP and start vital sign monitoring  Continue supportive care and assist with ADLs  2. 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 with hx including but not limited to quadriplegia, depression, bipolar, hypertension, and insomnia seen today for reports of weakness.  Alerted by staff that patient feeling weak.  Patient reports generally feeling weak as well as some myalgia, headache, and fatigue.  Reports he did sleep last night.  He otherwise reports he is eating and drinking as usual.  He denies any acute cough, shortness of breath, chills,  or GI symptoms.   No additional concerns from nursing.      REVIEW OF SYSTEMS:  Complete ROS negative other than as stated in HPI    HISTORY:  Medical Hx: Reviewed, unchanged  Family Hx: Reviewed, unchanged  Soc Hx: Reviewed, unchanged  No Known Allergies    PHYSICAL EXAM:    Vital Signs: /85, temp 98.6, , RR 20, O2 93% on room air    General: NAD, sitting in wheelchair  Eye Exam: anicteric sclera, no discharge, EOMI intact  Pulmonary: CTA, unlabored, on RA  Neurological: Awake, alert, engaged in conversation. Baseline dysarthria. Generalized weakness with poor coordination.  Able to actively move extremities.   Skin: No significant lesions appreciated, no significant rashes appreciated  Psych:  somewhat flat, pleasant, cooperative    PERTINENT LABS/IMAGING DATA:    11/5 CBC, BMP: Hemoglobin 13.5, WBC 4.8, platelet 140, glucose 79, BUN 18, creatinine 0.78, sodium 142, potassium 3.9, EGFR 102    6/4 CBC, CMP: Hemoglobin 13.2, WBC 4.4, platelet 164, glucose 86, BUN 17, creatinine 0.74, sodium 140, potassium 4.0, AST 20, ALT 21, EGFR 104    6/3 TSH: 2.53  Free T4: 0.93  Vitamin D: 46  Hemoglobin A1c: 5.0    5/10 lipid panel: Cholesterol 170, triglyceride 124, HDL 43,     4/25 CBC, CMP: Hemoglobin 12.1, WBC 4.5, platelet 134, glucose 82, BUN 11, creatinine 0.65, sodium 138, potassium 3.8, AST 26, ALT 36, EGFR 108       CURRENT MEDICATIONS:  All medications reviewed and updated in Nursing Home EMR.    Provider: Alexsander MEDINA  [facility]  Patient: Tong A Justine   11/20/24

## 2024-11-21 NOTE — ASSESSMENT & PLAN NOTE
Concern for possible infectious etiology  Check comprehensive respiratory viral panel - negative  Check CBC, CMP and start vital sign monitoring  Continue supportive care and assist with ADLs

## 2024-11-21 NOTE — ASSESSMENT & PLAN NOTE
Acute generalized weakness  Start infectious work up   Continue with PT, now assisted transfers, hx of falls  Continue supportive care

## 2024-11-25 ENCOUNTER — NURSING HOME VISIT (OUTPATIENT)
Dept: GERIATRICS | Facility: OTHER | Age: 59
End: 2024-11-25
Payer: MEDICARE

## 2024-11-25 DIAGNOSIS — G82.50 QUADRIPARESIS (HCC): Primary | Chronic | ICD-10-CM

## 2024-11-25 PROCEDURE — 99307 SBSQ NF CARE SF MDM 10: CPT | Performed by: INTERNAL MEDICINE

## 2024-11-26 ENCOUNTER — NURSING HOME VISIT (OUTPATIENT)
Dept: GERIATRICS | Facility: OTHER | Age: 59
End: 2024-11-26
Payer: MEDICARE

## 2024-11-26 DIAGNOSIS — F31.32 BIPOLAR 1 DISORDER, DEPRESSED, MODERATE (HCC): Chronic | ICD-10-CM

## 2024-11-26 DIAGNOSIS — K59.01 SLOW TRANSIT CONSTIPATION: Chronic | ICD-10-CM

## 2024-11-26 DIAGNOSIS — R53.1 WEAKNESS GENERALIZED: ICD-10-CM

## 2024-11-26 DIAGNOSIS — G47.59 OTHER PARASOMNIA: ICD-10-CM

## 2024-11-26 DIAGNOSIS — E78.5 DYSLIPIDEMIA: ICD-10-CM

## 2024-11-26 DIAGNOSIS — D69.6 THROMBOCYTOPENIA (HCC): ICD-10-CM

## 2024-11-26 DIAGNOSIS — G32.81 CEREBELLAR ATAXIA IN DISEASES CLASSIFIED ELSEWHERE (HCC): Primary | Chronic | ICD-10-CM
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
## 2024-11-26 PROCEDURE — 99309 SBSQ NF CARE MODERATE MDM 30: CPT

## 2024-11-26 NOTE — PROGRESS NOTES
MONTHLY VISIT  Location: [facility]  POS: 32 LTC    NAME: Tong Fletcher  AGE: 59 y.o. SEX: male    DATE OF ENCOUNTER: 11/26/2024    ASSESSMENT AND PROBLEMS:  1. 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 Bipolar 1 disorder, depressed, moderate (HCC)  Assessment & Plan:  Stable  Continue duloxetine, trazodone, chlorpromazine, valproic acid, clonazepam, olanzapine  Continue ongoing supportive care, monitoring mood behavior, sleep  3. Weakness generalized  Assessment & Plan:  Improved  Unclear etiology, recent labs and viral panel are negative  Continue ongoing supportive care, therapy  4. Dyslipidemia  Assessment & Plan:  Stable  Following with cardiology with repeat lipid panel in the next year and reassess ASCVD risk  5. Thrombocytopenia (HCC)  Assessment & Plan:  Stable, mild  Platelets 140  Monitor routine labs  6. Slow transit constipation  Assessment & Plan:  Stable  Continue senna  7. Other parasomnia  Assessment & Plan:  Stable  Continue melatonin, trazodone, mirtazapine, Zyprexa  Sleep hygiene      CHIEF COMPLAINT:    Monthly Visit     HISTORY OF PRESENT ILLNESS:  History taken from patient, staff, chart    Tong Fletcher is a 58 y/o male LTC resident of [facility] with hx including but not limited to quadriplegia, depression, bipolar, hypertension, and insomnia seen today for monthly visit, acute and chronic conditions..    Continues with ongoing supportive care at LT in setting of quadriplegia and cerebellar ataxia. Continues to mobilize in manual wheelchair.    Reports recent weakness has now resolved.  Denies any fever, fatigue, chills, shortness of breath.  Neuromuscular status stable.  He denies any pain.  He reports stable mood, appetite, sleep.  Mild progressive increase in weight.  No current concerns from nursing.      REVIEW OF  SYSTEMS:  Complete ROS negative other than as stated in HPI    HISTORY:  Medical Hx: Reviewed, unchanged  Family Hx: Reviewed, unchanged  Soc Hx: Reviewed, unchanged  No Known Allergies    PHYSICAL EXAM:    Vital Signs: /74, temp 97.1, HR 69, RR 16, O2 96% on room air  Weight 197.3 pounds, BMI 25.3    General: NAD, sitting in wheelchair  Eye Exam: anicteric sclera, no discharge, EOMI intact  ENT: moist mucous membranes  Cardiovascular: regular rate, regular rhythm, no murmurs, rubs, or gallops  Pulmonary: CTA, unlabored, on RA  Abdominal: soft, nontender, nondistended, bowel sounds audible  Neurological: slurred speech. Awake, alert, engaged in conversation. Generalized weakness with poor coordination, mild left sided weakness.  Skin: No significant lesions appreciated, no significant rashes appreciated  Psych: somewhat flat, pleasant, cooperative    PERTINENT LABS/IMAGING DATA:    11/21 CBC, CMP: Hemoglobin 12.7, date WBC 6.4, platelet 140, glucose 76, BUN 18, creatinine 0.70, sodium 142, potassium 3.8, alk phos 30, albumin 3.4, total bili 0.5, total protein 5.7, AST 13, ALT 14, EGFR 105    11/5 CBC, BMP: Hemoglobin 13.5, WBC 4.8, platelet 140, glucose 79, BUN 18, creatinine 0.78, sodium 142, potassium 3.9, EGFR 102    6/4 CBC, CMP: Hemoglobin 13.2, WBC 4.4, platelet 164, glucose 86, BUN 17, creatinine 0.74, sodium 140, potassium 4.0, AST 20, ALT 21, EGFR 104    6/3 TSH: 2.53  Free T4: 0.93  Vitamin D: 46  Hemoglobin A1c: 5.0    5/10 lipid panel: Cholesterol 170, triglyceride 124, HDL 43,       CURRENT MEDICATIONS:  All medications reviewed and updated in Nursing Home EMR.    Provider: Alexsander MEDINA  [facility]  Patient: Tong Fletcher   11/26/24

## 2024-11-26 NOTE — ASSESSMENT & PLAN NOTE
Stable  Continue duloxetine, trazodone, chlorpromazine, valproic acid, clonazepam, olanzapine  Continue ongoing supportive care, monitoring mood behavior, sleep

## 2024-11-26 NOTE — ASSESSMENT & PLAN NOTE
Stable  Recent weakness appears resolved  Continue to monitor safety of transfers, fall precautions

## 2024-11-26 NOTE — ASSESSMENT & PLAN NOTE
"Tong Fletcher is a 59-year-old man with diagnosis of anoxic encephalopathy who resides [facility]. He was seen for a seating and manual wheelchair evaluation on 10/18/24. He is 74\" tall and weighs 191 #. He is unable to transfer independently or ambulate. He has severe ataxia and abnormal motor control through all 4 extremities and trunk. He is able to propel a manual wheelchair with use of BUE and BLE's. His current manual wheelchair is 13 years old and in poor condition and needs to be replaced. He is unable to propel a standard wheelchair because he needs the chair to be set at the correct seat to floor height for his very long legs and have the correct dump (lower seat to floor height in the back than the front) for stability. Without the correct seat height and dump he is unstable and falls out to the chair when reaching forward for objects, necessitating the need for a seat belt. If it is dumped too much he tips the chair backward during transfers, hence the need for anti-tippers. He requires supportive backrest for stability and support as he has a history of back pain. He requires a pressure redistribution cushion as he sits in the chair 12+ hours per day. A J2 deep contour cushion is needed to provide the stability of the ischial shelf and leg contours to keep him centered during the ataxic movements. He requires plastic coasted hand rim and brake extensions to assist with his impaired hand control and ataxic movement patterns. He requires semi pneumatic tires and casters for maximum stability during transfers and propulsion. He requires leg rests for LE support for longer distances and on public transportation to medical appointments.  "

## 2024-11-26 NOTE — ASSESSMENT & PLAN NOTE
Improved  Unclear etiology, recent labs and viral panel are negative  Continue ongoing supportive care, therapy

## 2024-11-26 NOTE — PROGRESS NOTES
"MONTHLY VISIT  Location: [facility]  POS: [facility]    NAME: Tong Fletcher  AGE: 59 y.o. SEX: male    DATE OF ENCOUNTER: 11/25/2024    ASSESSMENT AND PROBLEMS:  1. Quadriparesis (HCC)  Assessment & Plan:  Tong Fletcher is a 59-year-old man with diagnosis of anoxic encephalopathy who resides [facility]. He was seen for a seating and manual wheelchair evaluation on 10/18/24. He is 74\" tall and weighs 191 #. He is unable to transfer independently or ambulate. He has severe ataxia and abnormal motor control through all 4 extremities and trunk. He is able to propel a manual wheelchair with use of BUE and BLE's. His current manual wheelchair is 13 years old and in poor condition and needs to be replaced. He is unable to propel a standard wheelchair because he needs the chair to be set at the correct seat to floor height for his very long legs and have the correct dump (lower seat to floor height in the back than the front) for stability. Without the correct seat height and dump he is unstable and falls out to the chair when reaching forward for objects, necessitating the need for a seat belt. If it is dumped too much he tips the chair backward during transfers, hence the need for anti-tippers. He requires supportive backrest for stability and support as he has a history of back pain. He requires a pressure redistribution cushion as he sits in the chair 12+ hours per day. A J2 deep contour cushion is needed to provide the stability of the ischial shelf and leg contours to keep him centered during the ataxic movements. He requires plastic coasted hand rim and brake extensions to assist with his impaired hand control and ataxic movement patterns. He requires semi pneumatic tires and casters for maximum stability during transfers and propulsion. He requires leg rests for LE support for longer distances and on public transportation to medical appointments.    CHIEF COMPLAINT:  Monthly Visit    HISTORY OF PRESENT " ILLNESS:  History taken from patient, physical therapy, nursing staff.  Patient and staff all report stable neuromuscular functioning.  Patient recently went to physical therapy for wheelchair evaluation.      HISTORY:  Medical Hx: Reviewed, unchanged  Family Hx: Reviewed, unchanged  Soc Hx: Reviewed, unchanged  No Known Allergies    PHYSICAL EXAM:  Vital Signs: /76, temp 96.7, HR 72, RR 16  General: NAD, sitting in wheelchair  Eye Exam: anicteric sclera, no discharge  ENT: moist mucous membranes  Cardiovascular: regular rate, regular rhythm, no murmurs, rubs, or gallops  Pulmonary: no wheeze, no rhonchi  Abdominal: soft, nontender, nondistended, bowel sounds audible  Neurological: Awake, alert, poor coordination of all 4 extremities  Skin: No significant lesions appreciated      Provider: Henry Levine MD MPH  Patient: Tong Fletcher

## 2024-12-20 ENCOUNTER — NURSING HOME VISIT (OUTPATIENT)
Dept: GERIATRICS | Facility: OTHER | Age: 59
End: 2024-12-20
Payer: MEDICARE

## 2024-12-20 DIAGNOSIS — F31.32 BIPOLAR 1 DISORDER, DEPRESSED, MODERATE (HCC): Chronic | ICD-10-CM

## 2024-12-20 DIAGNOSIS — G47.59 OTHER PARASOMNIA: Primary | ICD-10-CM

## 2024-12-20 PROCEDURE — 99308 SBSQ NF CARE LOW MDM 20: CPT

## 2024-12-20 NOTE — PROGRESS NOTES
ACUTE VISIT  Location: [facility]  POS: 32 [facility]    NAME: Tong Fletcher  AGE: 59 y.o. SEX: male    DATE OF ENCOUNTER: 12/20/2024    ASSESSMENT AND PROBLEMS:  1. Other parasomnia  Assessment & Plan:  Stable  Sleep has reportedly been doing well  Will stop melatonin and monitor sleep  Continue trazodone, mirtazapine, zyprexa  If continues to do well, consider tapering trazodone  Continue sleep hygiene  2. Bipolar 1 disorder, depressed, moderate (HCC)  Assessment & Plan:  Stable  Continue duloxetine, trazodone, chlorpromazine, valproic acid, clonazepam, olanzapine  Continue ongoing supportive care, monitoring mood behavior, sleep        CHIEF COMPLAINT:    Medication    HISTORY OF PRESENT ILLNESS:  History taken from patient, staff, chart    Tong Fletcher is a 58 y/o male LT resident of [facility] with hx including but not limited to quadriplegia, depression, bipolar, hypertension, and insomnia seen today for medication management.    Patient reports feelings of drowsiness and fatigue during the day.  Reports concern regarding the amount of medications he takes at night, would be interested in reducing medications as able.  Reports stable mood and sleep.   Nursing reports overall stable status.       REVIEW OF SYSTEMS:  Complete ROS negative other than as stated in HPI    HISTORY:  Medical Hx: Reviewed, unchanged  Family Hx: Reviewed, unchanged  Soc Hx: Reviewed, unchanged  No Known Allergies    PHYSICAL EXAM:    Vital Signs: /72, temp 97.1, HR 77, RR 18, O2 94% on room air    General: NAD, sitting in wheelchair  Eye Exam: anicteric sclera, no discharge  Pulmonary: unlabored, on RA  Neurological: slurred speech. Awake, alert, engaged in conversation. Generalized weakness with poor coordination, mild left sided weakness.  Skin: No significant lesions appreciated, no significant rashes appreciated  Psych: somewhat flat, pleasant, cooperative    PERTINENT LABS/IMAGING DATA:    12/4 CBC,  CMP: Hemoglobin 13.4, WBC 4.9, platelet 172, glucose 81, BUN 28, creatinine 0.78, sodium 141, potassium 4.3, AST 24, ALT 13, EGFR 102    11/21 CBC, CMP: Hemoglobin 12.7, date WBC 6.4, platelet 140, glucose 76, BUN 18, creatinine 0.70, sodium 142, potassium 3.8, alk phos 30, albumin 3.4, total bili 0.5, total protein 5.7, AST 13, ALT 14, EGFR 105      CURRENT MEDICATIONS:  All medications reviewed and updated in Nursing Home EMR.    Provider: Alexsander MEDINA  [facility]  Patient: Tong Fletcher   12/20/24

## 2024-12-20 NOTE — ASSESSMENT & PLAN NOTE
Stable  Sleep has reportedly been doing well  Will stop melatonin and monitor sleep  Continue trazodone, mirtazapine, zyprexa  If continues to do well, consider tapering trazodone  Continue sleep hygiene

## 2024-12-23 ENCOUNTER — NURSING HOME VISIT (OUTPATIENT)
Dept: GERIATRICS | Facility: OTHER | Age: 59
End: 2024-12-23
Payer: MEDICARE

## 2024-12-23 VITALS — BODY MASS INDEX: 24.91 KG/M2 | HEIGHT: 74 IN | WEIGHT: 194.1 LBS

## 2024-12-23 DIAGNOSIS — G32.81 CEREBELLAR ATAXIA IN DISEASES CLASSIFIED ELSEWHERE (HCC): Chronic | ICD-10-CM

## 2024-12-23 DIAGNOSIS — G82.50 QUADRIPARESIS (HCC): Chronic | ICD-10-CM

## 2024-12-23 DIAGNOSIS — F31.32 BIPOLAR 1 DISORDER, DEPRESSED, MODERATE (HCC): Primary | Chronic | ICD-10-CM

## 2024-12-23 DIAGNOSIS — K59.01 SLOW TRANSIT CONSTIPATION: Chronic | ICD-10-CM

## 2024-12-23 PROCEDURE — 99309 SBSQ NF CARE MODERATE MDM 30: CPT | Performed by: INTERNAL MEDICINE

## 2024-12-24 NOTE — PROGRESS NOTES
MONTHLY VISIT  Location: [facility]  POS: [facility]    NAME: Tong Fletcher  AGE: 59 y.o. SEX: male    DATE OF ENCOUNTER: 12/23/2024    ASSESSMENT AND PROBLEMS:  1. Bipolar 1 disorder, depressed, moderate ([facility])  Assessment & Plan:  Previously failed duloxetine and mirtazapine tapers.  Continue chlorpromazine, olanzapine, trazodone, valproic acid, duloxetine, mirtazapine.  He is only on the lowest dose of clonazepam which is not indicated for most any psychiatric diagnosis is a standing dose.  Will stop the lowest dose clonazepam and monitor.  If he continues to do well without this, may consider dose tapering of chlorpromazine.  2. Slow transit constipation  Assessment & Plan:  Stable.  Continue senna  3. Quadriparesis ([facility])  Assessment & Plan:  Ataxic quadriparesis.  Continue supportive care  4. 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 ([facility])  Assessment & Plan:  Stable.  Continue supportive care    CHIEF COMPLAINT:  Monthly Visit    HISTORY OF PRESENT ILLNESS:  History taken from patient.  He reports overall stable symptoms.  Reports episode where he did not take his pills 1 night and felt more energetic the next day.  Not sure if he needs all of his medications.  Otherwise nurses reports stable neuromuscular status mood and behaviors.  Overall patient also reports overall stable mood    REVIEW OF SYSTEMS:  Complete ROS negative other than as stated in HPI    HISTORY:  Medical Hx: Reviewed, unchanged  Family Hx: Reviewed, unchanged  Soc Hx: Reviewed, unchanged  No Known Allergies    PHYSICAL EXAM:  Vital Signs: /72, temp 97.1, HR 77, RR 18, O2 94% room air, weight 194.1 pounds  General: NAD  Eye Exam: anicteric sclera, no discharge  ENT: moist mucous membranes  Cardiovascular: regular rate, regular rhythm, no murmurs, rubs, or gallops  Pulmonary: no wheeze, no rhonchi  Abdominal: soft, nontender, nondistended, bowel sounds audible  Neurological: Awake, alert, poor coordination of all 4  extremities  Skin: No significant lesions appreciated    PERTINENT LABS/IMAGING DATA:  12/4/2024: Glucose 81, BUN 28, creatinine 0.78, sodium 141, potassium 4.3, calcium 8.8, albumin 3.6, total protein 5.7, AST 24, ALT 13, hemoglobin 13.4, WBC 4.9, platelet 172    Provider: Henry Levine MD MPH  Patient: Tong Fletcher

## 2024-12-24 NOTE — ASSESSMENT & PLAN NOTE
Previously failed duloxetine and mirtazapine tapers.  Continue chlorpromazine, olanzapine, trazodone, valproic acid, duloxetine, mirtazapine.  He is only on the lowest dose of clonazepam which is not indicated for most any psychiatric diagnosis is a standing dose.  Will stop the lowest dose clonazepam and monitor.  If he continues to do well without this, may consider dose tapering of chlorpromazine.

## 2025-01-23 ENCOUNTER — NURSING HOME VISIT (OUTPATIENT)
Dept: GERIATRICS | Facility: OTHER | Age: 60
End: 2025-01-23
Payer: MEDICARE

## 2025-01-23 DIAGNOSIS — F31.63 BIPOLAR 1 DISORDER, MIXED, SEVERE (HCC): Primary | Chronic | ICD-10-CM

## 2025-01-23 PROCEDURE — 99308 SBSQ NF CARE LOW MDM 20: CPT | Performed by: INTERNAL MEDICINE

## 2025-01-23 NOTE — PROGRESS NOTES
FOLLOW UP VISIT  Location: [facility]  POS: 32 ([facility])    NAME: Tong Fletcher  AGE: 59 y.o. SEX: male    ASSESSMENT AND PROBLEMS:  1. Bipolar 1 disorder, mixed, severe ([facility])  Assessment & Plan:  Now with increased manic symptoms reduce SNRI therapy.  Reduce duloxetine from 60 mg twice a day to 60 mg once a day.  Will continue with ongoing olanzapine, trazodone, valproic acid, chlorpromazine    CHIEF COMPLAINT:  Concern for tavo    HISTORY OF PRESENT ILLNESS:  History taken from nursing staff and patient.  Nursing staff report the patient is not spending more money on a variety different things including food, clothing, supplements.  At 1 point reporting that he thinks that his cousin is involved in a lawsuit which will get him more money.  Family also reports concern that patient's behavior has changed.  Patient denies any significant issues today and reports that he is only spending more money on his collagen supplement    REVIEW OF SYSTEMS:  Complete ROS negative other than as stated in HPI    HISTORY:  Medical Hx: Reviewed, unchanged  Family Hx: Reviewed, unchanged  Soc Hx: Reviewed, unchanged  No Known Allergies    PHYSICAL EXAM:  Vital Signs: /87, temp 97.5, HR 92, RR 19, O2 96% room air, weight 193.2 pounds  General: NAD, sitting in wheelchair  Eye Exam: anicteric sclera, no discharge  ENT: moist mucous membranes  Cardiovascular: regular rate, regular rhythm, no murmurs, rubs, or gallops  Pulmonary: no wheeze, no rhonchi  Abdominal: soft, nontender, nondistended, bowel sounds audible  Neurological: Awake, alert, poor coordination of all 4 extremities  Skin: No significant lesions appreciated    Provider: Henry Levine MD MPH  Patient: Tong Fletcher

## 2025-01-23 NOTE — ASSESSMENT & PLAN NOTE
Now with increased manic symptoms reduce SNRI therapy.  Reduce duloxetine from 60 mg twice a day to 60 mg once a day.  Will continue with ongoing olanzapine, trazodone, valproic acid, chlorpromazine

## 2025-01-28 ENCOUNTER — NURSING HOME VISIT (OUTPATIENT)
Dept: GERIATRICS | Facility: OTHER | Age: 60
End: 2025-01-28
Payer: MEDICARE

## 2025-01-28 VITALS — TEMPERATURE: 97.5 F | DIASTOLIC BLOOD PRESSURE: 87 MMHG | HEART RATE: 92 BPM | SYSTOLIC BLOOD PRESSURE: 128 MMHG

## 2025-01-28 DIAGNOSIS — F31.63 BIPOLAR 1 DISORDER, MIXED, SEVERE (HCC): Primary | Chronic | ICD-10-CM

## 2025-01-28 DIAGNOSIS — G32.81 CEREBELLAR ATAXIA IN DISEASES CLASSIFIED ELSEWHERE (HCC): Chronic | ICD-10-CM

## 2025-01-28 DIAGNOSIS — G82.50 QUADRIPARESIS (HCC): Chronic | ICD-10-CM

## 2025-01-28 DIAGNOSIS — K59.01 SLOW TRANSIT CONSTIPATION: Chronic | ICD-10-CM

## 2025-01-28 PROCEDURE — 99309 SBSQ NF CARE MODERATE MDM 30: CPT

## 2025-01-28 RX ORDER — CHLORPROMAZINE HYDROCHLORIDE 200 MG/1
200 TABLET, FILM COATED ORAL
Qty: 30 TABLET | Refills: 3 | Status: SHIPPED | OUTPATIENT
Start: 2025-01-28

## 2025-01-28 NOTE — PROGRESS NOTES
[facility]  [address], [place], [place]  [phone]  Facility: [facility]  POS 32    NAME: Tong Fletcher  AGE: 59 y.o. 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 (HCC) (Chronic)    Stable   Continue supportive care         Bipolar 1 disorder, mixed, severe (HCC) - Primary (Chronic)    Mood appears stable   Continue duloxetine 60 mg daily, zyprexa 10 mg nightly, trazadone 150 mg nightly, valproic acid 250 mg BID, and thorazine 200 mg nightly  Provide psychosocial support          Quadriparesis (HCC) (Chronic)    Ataxic quadriparesis  Continue supportive care, ADL assistance          Slow transit constipation (Chronic)    Stable  Continue senna  Encourage po hydration               Chief Complaint:     Follow up visit     History of Present Illness:     Patient being seen for monthly visit. Seen in collaboration with nursing staff. Staff report he is doing well with no acute concerns. He is seen while eating lunch. He denies CP/SOB. Reports no concerns. Reports feeling well.         The following portions of the patient's history were reviewed and updated as appropriate: allergies, current medications, past family history, past medical history, past social history, past surgical history and problem list.     Review of Systems:     Review of Systems   All other systems reviewed and are negative.       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   Constitutional:       General: He is not in acute distress.     Appearance: He is well-developed.   HENT:      Head: Normocephalic and atraumatic.   Eyes:      Conjunctiva/sclera: Conjunctivae normal.   Cardiovascular:      Rate and Rhythm: Normal rate and regular rhythm.      Heart sounds: No murmur heard.  Pulmonary:      Effort: Pulmonary effort is normal. No respiratory distress.      Breath sounds: Normal breath sounds.   Musculoskeletal:      Cervical back: Neck supple.      Right lower leg: No edema.      Left lower leg: No edema.   Skin:     General: Skin is warm and dry.      Capillary Refill: Capillary refill takes less than 2 seconds.   Neurological:      Mental Status: He is alert.   Psychiatric:         Mood and Affect: Mood normal.         Pertinent Laboratory/Diagnostic Studies:    Laboratory Results: I have personally reviewed the pertinent laboratory results/reports     Radiology/Other Diagnostic Testing Results: Results Review Statement: No pertinent imaging studies reviewed.    ADAM Xiong  Geriatric Medicine

## 2025-01-28 NOTE — ASSESSMENT & PLAN NOTE
Mood appears stable   Continue duloxetine 60 mg daily, zyprexa 10 mg nightly, trazadone 150 mg nightly, valproic acid 250 mg BID, and thorazine 200 mg nightly  Provide psychosocial support

## 2025-02-24 ENCOUNTER — NURSING HOME VISIT (OUTPATIENT)
Dept: GERIATRICS | Facility: OTHER | Age: 60
End: 2025-02-24
Payer: MEDICARE

## 2025-02-24 DIAGNOSIS — F31.63 BIPOLAR 1 DISORDER, MIXED, SEVERE (HCC): Chronic | ICD-10-CM

## 2025-02-24 DIAGNOSIS — G82.50 QUADRIPARESIS (HCC): Primary | Chronic | ICD-10-CM

## 2025-02-24 DIAGNOSIS — K59.01 SLOW TRANSIT CONSTIPATION: Chronic | ICD-10-CM

## 2025-02-24 PROCEDURE — 99309 SBSQ NF CARE MODERATE MDM 30: CPT | Performed by: INTERNAL MEDICINE

## 2025-02-24 NOTE — PROGRESS NOTES
MONTHLY VISIT  Location: [facility]  POS: [facility]    NAME: Tong Fletcher  AGE: 59 y.o. SEX: male    DATE OF ENCOUNTER: 2/24/2025    ASSESSMENT AND PROBLEMS:  1. Quadriparesis (HCC)  Assessment & Plan:  Stable.  Continue supportive care  2. Bipolar 1 disorder, mixed, severe (HCC)  Assessment & Plan:  Stable.  Continue chlorpromazine, olanzapine, trazodone, valproic acid, duloxetine, mirtazapine  3. Slow transit constipation  Assessment & Plan:  Stable.  Continue senna    CHIEF COMPLAINT:  Monthly Visit    HISTORY OF PRESENT ILLNESS:  History taken from patient.  Reports doing well without issues today.  Nurses report stable neuromuscular status, mood, behaviors.    REVIEW OF SYSTEMS:  Complete ROS negative other than as stated in HPI    HISTORY:  Medical Hx: Reviewed, unchanged  Family Hx: Reviewed, unchanged  Soc Hx: Reviewed, unchanged  No Known Allergies    PHYSICAL EXAM:  Vital Signs: /77, temp 97.8, HR 81, RR 19, O2 96% room air, weight 193.7 pounds  General: NAD, sitting in wheelchair  Eye Exam: anicteric sclera, no discharge  ENT: moist mucous membranes  Cardiovascular: regular rate, regular rhythm, no murmurs, rubs, or gallops  Pulmonary: no wheeze, no rhonchi  Abdominal: soft, nontender, nondistended, bowel sounds audible  Neurological: Awake, alert, poor coronation of all 4 extremities  Skin: No significant lesions appreciated    PERTINENT LABS/IMAGING DATA:  12/4/2024: Creatinine 0.78, sodium 141, AST 24, ALT 13, WBC 4.9, platelet 172, hemoglobin 13.4    Provider: Henry Levine MD MPH  Patient: Tong Fletcher

## 2025-03-09 ENCOUNTER — TELEPHONE (OUTPATIENT)
Dept: OTHER | Facility: OTHER | Age: 60
End: 2025-03-09

## 2025-03-09 NOTE — TELEPHONE ENCOUNTER
"Christine MELENDEZ stated, \"The pt has body aches, nausea and weakness. Could this be connected to high valproic acid? Should we have an order placed to rule this out?\"    On call notified via secure chat   "

## 2025-03-10 ENCOUNTER — NURSING HOME VISIT (OUTPATIENT)
Dept: GERIATRICS | Facility: OTHER | Age: 60
End: 2025-03-10
Payer: MEDICARE

## 2025-03-10 DIAGNOSIS — D69.6 THROMBOCYTOPENIA (HCC): ICD-10-CM

## 2025-03-10 DIAGNOSIS — G32.81 CEREBELLAR ATAXIA IN DISEASES CLASSIFIED ELSEWHERE (HCC): Chronic | ICD-10-CM

## 2025-03-10 DIAGNOSIS — R53.83 LETHARGY: Primary | ICD-10-CM

## 2025-03-10 DIAGNOSIS — F31.63 BIPOLAR 1 DISORDER, MIXED, SEVERE (HCC): Chronic | ICD-10-CM
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
## 2025-03-10 PROCEDURE — 99309 SBSQ NF CARE MODERATE MDM 30: CPT

## 2025-03-10 NOTE — ASSESSMENT & PLAN NOTE
S/p unwitnessed fall on 3/9  Right leg abrasion otherwise no overt injury   Neurological status stable  Assist with transfers  At high risk for recurrent falls in setting of acute weakness and cerebellar ataxia   Continue vitals and neuro checks post fall protocol

## 2025-03-10 NOTE — ASSESSMENT & PLAN NOTE
Stable  Valproic acid level today was 100   Reduce Valproic acid to 500 mg BID and monitor  Continue chlorpromazine, olanzapine, trazodone, duloxetine, mirtazapine  Continue supportive care

## 2025-03-10 NOTE — ASSESSMENT & PLAN NOTE
Likely multifactorial  Concern for possible infectious etiology, known coronavirus HKU1 on floor, recent swab negative  Labs showing mild leukopenia, mild BUN and creatinine elevation, BP initially also on lower side today with decreased PO intake, will give additional liter of NS via clysis, continue to encourage PO intake   Stop Ibuprofen  Continue supportive care, vital and clinical status monitoring   Repeat labs wednesday

## 2025-03-10 NOTE — PROGRESS NOTES
ACUTE VISIT  Location: [facility]  POS: 32 LTC    NAME: Tong Fletcher  AGE: 59 y.o. SEX: male    DATE OF ENCOUNTER: 3/10/2025    Assessment & Plan  Lethargy  Likely multifactorial  Concern for possible infectious etiology, known coronavirus HKU1 on floor, recent swab negative  Labs showing mild leukopenia, mild BUN and creatinine elevation, BP initially also on lower side today with decreased PO intake, will give additional liter of NS via clysis, continue to encourage PO intake   Stop Ibuprofen  Continue supportive care, vital and clinical status monitoring   Repeat labs wednesday         Fall from wheelchair, initial encounter  S/p unwitnessed fall on 3/9  Right leg abrasion otherwise no overt injury   Neurological status stable  Assist with transfers  At high risk for recurrent falls in setting of acute weakness and cerebellar ataxia   Continue vitals and neuro checks post fall protocol         Bipolar 1 disorder, mixed, severe (HCC)  Stable  Valproic acid level today was 100   Reduce Valproic acid to 500 mg BID and monitor  Continue chlorpromazine, olanzapine, trazodone, duloxetine, mirtazapine  Continue supportive care         Thrombocytopenia (HCC)  Acute on chronic  Platelets 129  Now reducing valproic acid  Monitor routine labs                CHIEF COMPLAINT:    Lethargy, fall     HISTORY OF PRESENT ILLNESS:  History taken from patient, staff, chart    Tong Fletcher is a 60 y/o male LTC resident of [facility] with hx including but not limited to quadriplegia, depression, bipolar, hypertension, and insomnia seen today for lethargy, fall.     Nursing report patient noted to have low-grade temp, tachycardia, nausea, weakness over the weekend as well as unwitnessed fall. Previous GI symptoms now resolved however patient with decreased p.o. intake as well, received fluids via clysis yesterday.   Upon exam, patient seen laying in bed, appears tired but arousable, engaged in  conversation.  He denies any shortness of breath, respiratory changes, chills, nausea, vomiting. Reports feeling tired.   Lab work reviewed.           REVIEW OF SYSTEMS:  Complete ROS negative other than as stated in HPI    HISTORY:  Medical Hx: Reviewed, unchanged  Family Hx: Reviewed, unchanged  Soc Hx: Reviewed, unchanged  No Known Allergies    PHYSICAL EXAM:    Vital Signs: /69, temp 97.9, HR 80, RR 16, O2 95% on room air    General: fatigued, laying in bed  Eye Exam: anicteric sclera, no discharge  Cardiac: regular rate and rhythm  Pulmonary: CTA, unlabored, on RA  Neurological: slurred speech. Fatigued but engaged in conversation. Generalized weakness with poor coordination, mild left sided weakness.  Skin: RLE abrasion  Psych: somewhat flat, pleasant, cooperative    PERTINENT LABS/IMAGING DATA:    3/9 CBC, CMP: Hemoglobin 13.9, WBC 3.6, platelet 129, glucose 94, BUN 25, creatinine 0.86, sodium 138, potassium 3.5, AST 23, ALT 22, EGFR 99  Valproic acid: 100    3/8 CBC, BMP: Hemoglobin 15.3, WBCs 5.2, platelet 172, glucose 115, BUN 29, creatinine 0.98, sodium 138, potassium 3.6, EGFR 88    12/4 CBC, CMP: Hemoglobin 13.4, WBC 4.9, platelet 172, glucose 81, BUN 28, creatinine 0.78, sodium 141, potassium 4.3, AST 24, ALT 13, EGFR 102    11/21 CBC, CMP: Hemoglobin 12.7, date WBC 6.4, platelet 140, glucose 76, BUN 18, creatinine 0.70, sodium 142, potassium 3.8, alk phos 30, albumin 3.4, total bili 0.5, total protein 5.7, AST 13, ALT 14, EGFR 105      CURRENT MEDICATIONS:  All medications reviewed and updated in Nursing Home EMR.    Provider: Alexsander MEDINA  [facility]  Patient: Tong Fletcher   03/10/25

## 2025-03-12 ENCOUNTER — NURSING HOME VISIT (OUTPATIENT)
Dept: GERIATRICS | Facility: OTHER | Age: 60
End: 2025-03-12
Payer: MEDICARE

## 2025-03-12 DIAGNOSIS — F31.63 BIPOLAR 1 DISORDER, MIXED, SEVERE (HCC): Chronic | ICD-10-CM
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
## 2025-03-12 DIAGNOSIS — R53.83 LETHARGY: Primary | ICD-10-CM

## 2025-03-12 PROCEDURE — 99308 SBSQ NF CARE LOW MDM 20: CPT

## 2025-03-12 NOTE — ASSESSMENT & PLAN NOTE
Stable  Valproic acid recently reduced, continue to monitor mood and behaviors  Continue chlorpromazine, olanzapine, trazodone, duloxetine, mirtazapine  Continue supportive care

## 2025-03-12 NOTE — ASSESSMENT & PLAN NOTE
Stable  S/p fall on 3/9  Small abrasion to right lateral calf and healing bruise near right lateral knee  Continue with supervised/assistance with transfers  Fall precautions

## 2025-03-12 NOTE — ASSESSMENT & PLAN NOTE
Stable, resolved  Likely multifactorial, possible viral etiology  Improved s/p fluids   Labs today stable, vitals stable  Continue to monitor symptoms

## 2025-03-12 NOTE — PROGRESS NOTES
ACUTE VISIT  Location: [facility]  POS: [address] [facility]    NAME: Tong Fletcher  AGE: 59 y.o. SEX: male    DATE OF ENCOUNTER: 3/12/2025    Assessment & Plan  Lethargy  Stable, resolved  Likely multifactorial, possible viral etiology  Improved s/p fluids   Labs today stable, vitals stable  Continue to monitor symptoms       Fall from wheelchair, subsequent encounter  Stable  S/p fall on 3/9  Small abrasion to right lateral calf and healing bruise near right lateral knee  Continue with supervised/assistance with transfers  Fall precautions       Bipolar 1 disorder, mixed, severe (HCC)  Stable  Valproic acid recently reduced, continue to monitor mood and behaviors  Continue chlorpromazine, olanzapine, trazodone, duloxetine, mirtazapine  Continue supportive care                  CHIEF COMPLAINT:    Acute and chronic conditions    HISTORY OF PRESENT ILLNESS:  History taken from patient, staff, chart    Tong Fletcher is a 58 y/o male LT resident of [facility] with hx including but not limited to quadriplegia, depression, bipolar, hypertension, and insomnia seen today for follow up lethargy, fall, labs.     Patient with recent low-grade temp, tachycardia, nausea, weakness s/p fall.  Recently received additional fluid via clysis. Labs reviewed today showing stable status.  On today's exam, patient seen mobilizing in manual WC, awake, alert, NAD. Reports feeling much better, denies any fever, fatigue, chills as well as any mood changes. Reports feeling stronger.  Nursing today reports stable status.      REVIEW OF SYSTEMS:  Complete ROS negative other than as stated in HPI    HISTORY:  Medical Hx: Reviewed, unchanged  Family Hx: Reviewed, unchanged  Soc Hx: Reviewed, unchanged  No Known Allergies    PHYSICAL EXAM:    Vital Signs: /61, temp 98.3, HR 82, RR 18, O2 100% on room air    General: NAD, sitting in WC  Eye Exam: anicteric sclera, no discharge  Cardiac: regular rate and rhythm  Pulmonary:  CTA, unlabored, on RA  Neurological: slurred speech. Fatigued but engaged in conversation. Generalized weakness with poor coordination, mild left sided weakness.  Skin: RLE abrasion, yellow to purple bruising near right lateral knee  Psych: somewhat flat, pleasant, cooperative    PERTINENT LABS/IMAGING DATA:    3/12 CBC, CMP: Hemoglobin 12.2, WBC 4.4, platelet 146, glucose 93, BUN 13, creatinine 0.70, sodium 142, potassium 3.7, alk phos 33, albumin 3.3, total protein 5.5, AST 20, ALT 20, EGFR 105    3/9 CBC, CMP: Hemoglobin 13.9, WBC 3.6, platelet 129, glucose 94, BUN 25, creatinine 0.86, sodium 138, potassium 3.5, AST 23, ALT 22, EGFR 99  Valproic acid: 100    3/8 CBC, BMP: Hemoglobin 15.3, WBCs 5.2, platelet 172, glucose 115, BUN 29, creatinine 0.98, sodium 138, potassium 3.6, EGFR 88    12/4 CBC, CMP: Hemoglobin 13.4, WBC 4.9, platelet 172, glucose 81, BUN 28, creatinine 0.78, sodium 141, potassium 4.3, AST 24, ALT 13, EGFR 102    11/21 CBC, CMP: Hemoglobin 12.7, WBC 6.4, platelet 140, glucose 76, BUN 18, creatinine 0.70, sodium 142, potassium 3.8, alk phos 30, albumin 3.4, total bili 0.5, total protein 5.7, AST 13, ALT 14, EGFR 105      CURRENT MEDICATIONS:  All medications reviewed and updated in Nursing Home EMR.    Provider: Alexsander MEDINA  [facility]  Patient: Tong Salinaste   03/12/25

## 2025-03-20 ENCOUNTER — NURSING HOME VISIT (OUTPATIENT)
Dept: GERIATRICS | Facility: OTHER | Age: 60
End: 2025-03-20
Payer: MEDICARE

## 2025-03-20 DIAGNOSIS — G47.59 OTHER PARASOMNIA: ICD-10-CM

## 2025-03-20 DIAGNOSIS — D69.6 THROMBOCYTOPENIA (HCC): ICD-10-CM

## 2025-03-20 DIAGNOSIS — G32.81 CEREBELLAR ATAXIA IN DISEASES CLASSIFIED ELSEWHERE (HCC): Chronic | ICD-10-CM

## 2025-03-20 DIAGNOSIS — F31.63 BIPOLAR 1 DISORDER, MIXED, SEVERE (HCC): Chronic | ICD-10-CM

## 2025-03-20 DIAGNOSIS — K59.01 SLOW TRANSIT CONSTIPATION: Chronic | ICD-10-CM

## 2025-03-20 DIAGNOSIS — G82.50 QUADRIPARESIS (HCC): Primary | Chronic | ICD-10-CM
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
## 2025-03-20 PROBLEM — R53.83 LETHARGY: Status: RESOLVED | Noted: 2025-03-10 | Resolved: 2025-03-20

## 2025-03-20 PROCEDURE — 99309 SBSQ NF CARE MODERATE MDM 30: CPT

## 2025-03-20 NOTE — PROGRESS NOTES
ACUTE VISIT  Location: [facility]  POS: [address] [facility]    NAME: Tong Fletcher  AGE: 60 y.o. 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 (HCC)  Stable  Continue ongoing supportive care and assistance at LTC       Quadriparesis (HCC)  Stable  Continue ongoing supportive care LTC  Manual wheelchair use       Bipolar 1 disorder, mixed, severe (HCC)  Stable  Continue chlorpromazine, olanzapine, trazodone, valproic acid, duloxetine, mirtazapine  Appears to be doing well on reduced dosing of the valproic acid  Continue supportive care as well as monitoring of mood and behaviors       Thrombocytopenia (HCC)  Stable, chronic, mild  Likely due to chronic valproic acid use  Monitor routine labs       Other parasomnia  With some recent disruption due to having to urinate at night  Limit fluids before bed  Continue to monitor symptoms and sleep trends  Will continue trazodone, mirtazapine, Zyprexa  Sleep hygiene       Slow transit constipation  Stable  Continue Senna           CHIEF COMPLAINT:    Monthly visit    HISTORY OF PRESENT ILLNESS:  History taken from patient, staff, chart    Tong Fletcher is a 59 y/o male [facility] resident of [facility] with hx including but not limited to quadriplegia, depression, bipolar, hypertension, and insomnia seen today for routine monthly visit.    Recent lethargy remains resolved, no further falls.  Continues to mobilize in manual wheelchair.  Reports stable mood and appetite.  Does report some issues with sleep, reports having to get up and urinate at night and has difficulty falling back asleep.  He reports stable respiratory, , GI status.  Vital signs stable.  No additional concerns from nursing.    REVIEW OF SYSTEMS:  Complete ROS negative other than as stated in HPI    HISTORY:  Medical Hx: Reviewed, unchanged  Family Hx: Reviewed, unchanged  Soc Hx: Reviewed, unchanged  No Known  Allergies    PHYSICAL EXAM:    Vital Signs: /72, temp 97.7, HR 76, RR 16, O2 97% on room air  Weight 194.6 pounds, BMI 25.0    General: NAD, sitting in WC  Eye Exam: anicteric sclera, no discharge  Cardiac: regular rate and rhythm  Pulmonary: CTA, unlabored, on RA  Neurological: slurred speech. Fatigued but engaged in conversation. Generalized weakness with poor coordination, mild left sided weakness.  Skin: RLE abrasion, yellow to purple bruising near right lateral knee - almost resolved  Psych: somewhat flat, pleasant, cooperative    PERTINENT LABS/IMAGING DATA:    3/12 CBC, CMP: Hemoglobin 12.2, WBC 4.4, platelet 146, glucose 93, BUN 13, creatinine 0.70, sodium 142, potassium 3.7, alk phos 33, albumin 3.3, total protein 5.5, AST 20, ALT 20, EGFR 105    3/9 CBC, CMP: Hemoglobin 13.9, WBC 3.6, platelet 129, glucose 94, BUN 25, creatinine 0.86, sodium 138, potassium 3.5, AST 23, ALT 22, EGFR 99  Valproic acid: 100    3/8 CBC, BMP: Hemoglobin 15.3, WBCs 5.2, platelet 172, glucose 115, BUN 29, creatinine 0.98, sodium 138, potassium 3.6, EGFR 88    12/4 CBC, CMP: Hemoglobin 13.4, WBC 4.9, platelet 172, glucose 81, BUN 28, creatinine 0.78, sodium 141, potassium 4.3, AST 24, ALT 13, EGFR 102    11/21 CBC, CMP: Hemoglobin 12.7, WBC 6.4, platelet 140, glucose 76, BUN 18, creatinine 0.70, sodium 142, potassium 3.8, alk phos 30, albumin 3.4, total bili 0.5, total protein 5.7, AST 13, ALT 14, EGFR 105      CURRENT MEDICATIONS:  All medications reviewed and updated in Nursing Home EMR.    Provider: Alexsander MEDINA  [facility]  Patient: Tong Fletcher   03/20/25

## 2025-03-20 NOTE — ASSESSMENT & PLAN NOTE
With some recent disruption due to having to urinate at night  Limit fluids before bed  Continue to monitor symptoms and sleep trends  Will continue trazodone, mirtazapine, Zyprexa  Sleep hygiene

## 2025-03-20 NOTE — ASSESSMENT & PLAN NOTE
Stable  Continue chlorpromazine, olanzapine, trazodone, valproic acid, duloxetine, mirtazapine  Appears to be doing well on reduced dosing of the valproic acid  Continue supportive care as well as monitoring of mood and behaviors

## 2025-04-10 ENCOUNTER — NURSING HOME VISIT (OUTPATIENT)
Dept: GERIATRICS | Facility: OTHER | Age: 60
End: 2025-04-10
Payer: MEDICARE

## 2025-04-10 DIAGNOSIS — R42 LIGHTHEADED: ICD-10-CM

## 2025-04-10 DIAGNOSIS — G32.81 CEREBELLAR ATAXIA IN DISEASES CLASSIFIED ELSEWHERE (HCC): Primary | Chronic | ICD-10-CM

## 2025-04-10 DIAGNOSIS — R06.89 HYPERCAPNIA: ICD-10-CM

## 2025-04-10 PROCEDURE — 99309 SBSQ NF CARE MODERATE MDM 30: CPT

## 2025-04-10 NOTE — PROGRESS NOTES
ACUTE VISIT  Location: [facility]  POS: 32 LTC    NAME: Tong Fletcher  AGE: 60 y.o. 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)  Stable  Remains at increased risk for falls, acute on chronic nystagmus with ataxia, assist with transfers, continue supportive care       Lightheaded  Unclear etiology  Check stat labs - CBC stable, CMP and respiratory comprehensive respiratory viral panel pending   Start vital sign monitoring   Continue to monitor nystagmus- acute on chronic with baseline fluctuation - continues to work with therapy, could potentially consider starting baclofen   Consider sending for head CT  Remains high risk for falls, has hx of falls, assist with transfers        Hypercapnia  Elevated carbon dioxide in labs, hx of borderline elevated readings headache, poor sleep quality, and intermittent lightheadedness, will check overnight Sentec study with transcutaneous c02 monitoring             CHIEF COMPLAINT:    Lightheaded    HISTORY OF PRESENT ILLNESS:  History taken from patient, staff, chart    Tong Fletcher is a 61 y/o male LT resident of [facility] with hx including but not limited to quadriplegia, depression, bipolar, hypertension, and insomnia seen today for lightheadedness.    Patient and nursing report increased nystagmus from usual baseline however this fluctuates at times. PT also present during part of visit, hx of fluctuating nystagmus reported. No headache or additional symptoms.   No acute deficits. Vitals stable.   STAT labs ordered and reviewed today.  Nursing later report that patient doing well this afternoon.       REVIEW OF SYSTEMS:  Complete ROS negative other than as stated in HPI    HISTORY:  Medical Hx: Reviewed, unchanged  Family Hx: Reviewed, unchanged  Soc Hx: Reviewed, unchanged  No Known Allergies    PHYSICAL EXAM:    Vital Signs: /92, temp 97.2, HR 80, RR 18, O2 95%  on room air    General: NAD, sitting in WC  Eye Exam: horizontal nystagmus  Cardiac: regular rate and rhythm  Pulmonary: CTA, unlabored, on RA  Neurological: slurred speech. Awake, alert, NAD. Generalized weakness with poor coordination, mild left sided weakness.  Skin:No over rashes or lesions  Psych: somewhat flat, pleasant, cooperative    PERTINENT LABS/IMAGING DATA:    4/10 CBC: Hemoglobin 14.2, WBC 4.6, platelet 166    3/12 CBC, CMP: Hemoglobin 12.2, WBC 4.4, platelet 146, glucose 93, BUN 13, creatinine 0.70, sodium 142, potassium 3.7, alk phos 33, albumin 3.3, total protein 5.5, AST 20, ALT 20, EGFR 105    3/9 CBC, CMP: Hemoglobin 13.9, WBC 3.6, platelet 129, glucose 94, BUN 25, creatinine 0.86, sodium 138, potassium 3.5, AST 23, ALT 22, EGFR 99  Valproic acid: 100    3/8 CBC, BMP: Hemoglobin 15.3, WBCs 5.2, platelet 172, glucose 115, BUN 29, creatinine 0.98, sodium 138, potassium 3.6, EGFR 88    12/4 CBC, CMP: Hemoglobin 13.4, WBC 4.9, platelet 172, glucose 81, BUN 28, creatinine 0.78, sodium 141, potassium 4.3, AST 24, ALT 13, EGFR 102    11/21 CBC, CMP: Hemoglobin 12.7, WBC 6.4, platelet 140, glucose 76, BUN 18, creatinine 0.70, sodium 142, potassium 3.8, alk phos 30, albumin 3.4, total bili 0.5, total protein 5.7, AST 13, ALT 14, EGFR 105      CURRENT MEDICATIONS:  All medications reviewed and updated in Nursing Home EMR.    Provider: Alexsander MEDINA  [facility]  Patient: Tong Fletcher   04/10/25

## 2025-04-10 NOTE — ASSESSMENT & PLAN NOTE
Stable  Remains at increased risk for falls, acute on chronic nystagmus with ataxia, assist with transfers, continue supportive care

## 2025-04-10 NOTE — ASSESSMENT & PLAN NOTE
Unclear etiology  Check stat labs - CBC stable, CMP and respiratory comprehensive respiratory viral panel pending   Start vital sign monitoring   Continue to monitor nystagmus- acute on chronic with baseline fluctuation - continues to work with therapy, could potentially consider starting baclofen   Consider sending for head CT  Remains high risk for falls, has hx of falls, assist with transfers

## 2025-04-11 PROBLEM — R06.89 HYPERCAPNIA: Status: ACTIVE | Noted: 2025-04-11

## 2025-04-11 NOTE — ASSESSMENT & PLAN NOTE
Elevated carbon dioxide in labs, hx of borderline elevated readings headache, poor sleep quality, and intermittent lightheadedness, will check overnight Sentec study with transcutaneous c02 monitoring

## 2025-04-24 ENCOUNTER — NURSING HOME VISIT (OUTPATIENT)
Dept: GERIATRICS | Facility: OTHER | Age: 60
End: 2025-04-24
Payer: MEDICARE

## 2025-04-24 DIAGNOSIS — G32.81 CEREBELLAR ATAXIA IN DISEASES CLASSIFIED ELSEWHERE (HCC): Primary | Chronic | ICD-10-CM

## 2025-04-24 DIAGNOSIS — G82.50 QUADRIPARESIS (HCC): Chronic | ICD-10-CM

## 2025-04-24 DIAGNOSIS — K59.01 SLOW TRANSIT CONSTIPATION: Chronic | ICD-10-CM
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
## 2025-04-24 DIAGNOSIS — F31.63 BIPOLAR 1 DISORDER, MIXED, SEVERE (HCC): Chronic | ICD-10-CM

## 2025-04-24 PROCEDURE — 99309 SBSQ NF CARE MODERATE MDM 30: CPT | Performed by: INTERNAL MEDICINE

## 2025-04-24 NOTE — PROGRESS NOTES
MONTHLY VISIT  Location: [facility]  POS: [facility]    NAME: Tong Fletcher  AGE: 60 y.o. SEX: male    DATE OF ENCOUNTER: 4/24/2025    ASSESSMENT AND PROBLEMS:  1. 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  They will work on changing patient to seatbelt alarm for safety.  2. Bipolar 1 disorder, mixed, severe (HCC)  Assessment & Plan:  Stable.  Continue chlorpromazine, olanzapine, trazodone, valproic acid, duloxetine, mirtazapine  3. Quadriparesis (HCC)  Assessment & Plan:  Stable.  Continue ongoing supportive care  4. Slow transit constipation  Assessment & Plan:  Stable.  Continue senna  5. Fall from wheelchair, initial encounter  Assessment & Plan:  Stable.,  Without significant injury.  As above.    CHIEF COMPLAINT:  Monthly Visit    HISTORY OF PRESENT ILLNESS:  History taken from patient and nursing staff.  Nursing staff alerted me to the fact that patient had fall out of his wheelchair when he was bending forward to pick something off the floor.  Patient was without me any major injury.  Patient today reports he is doing fine without injury from his fall.  Again patient request to start taking a vitamin supplement that he would like to start.    REVIEW OF SYSTEMS:  Complete ROS negative other than as stated in HPI    HISTORY:  Medical Hx: Reviewed, unchanged  Family Hx: Reviewed, unchanged  Soc Hx: Reviewed, unchanged  No Known Allergies    PHYSICAL EXAM:  Vital Signs: /53, temp 97.2, HR 88, RR 18, O2 96% room air  General: NAD, sitting in wheelchair  Eye Exam: anicteric sclera, no discharge  ENT: moist mucous membranes  Cardiovascular: regular rate, regular rhythm, no murmurs, rubs, or gallops  Pulmonary: no wheeze, no rhonchi  Abdominal: soft, nontender, nondistended, bowel sounds audible  Neurological: Awake, alert, procreation of all 4 extremities  Skin: No significant lesions appreciated    PERTINENT LABS/IMAGING DATA:  4/10/2025:  Hemoglobin 14.2, WBC 4.6, platelet 166, creatinine 0.73, sodium 140, AST 13, ALT 12    Provider: Henry Levine MD MPH  Patient: Tong Fletcher

## 2025-05-05 ENCOUNTER — NURSING HOME VISIT (OUTPATIENT)
Dept: GERIATRICS | Facility: OTHER | Age: 60
End: 2025-05-05
Payer: MEDICARE

## 2025-05-05 DIAGNOSIS — B34.8 RHINOVIRUS: Primary | ICD-10-CM

## 2025-05-05 DIAGNOSIS — R06.89 HYPERCAPNIA: ICD-10-CM

## 2025-05-05 DIAGNOSIS — G32.81 CEREBELLAR ATAXIA IN DISEASES CLASSIFIED ELSEWHERE (HCC): Chronic | ICD-10-CM

## 2025-05-05 PROBLEM — R42 LIGHTHEADED: Status: RESOLVED | Noted: 2020-07-27 | Resolved: 2025-05-05

## 2025-05-05 PROCEDURE — 99308 SBSQ NF CARE LOW MDM 20: CPT

## 2025-05-05 NOTE — ASSESSMENT & PLAN NOTE
+5/3  Mild symptoms, congestion  Start guaifenesin around-the-clock for 5 days as well as start Flonase twice daily  Continue monitor clinical status, vital signs, maintain isolation precautions  Supportive care

## 2025-05-05 NOTE — ASSESSMENT & PLAN NOTE
Stable  Hx of falls, increase risk for falls in setting of current viral infection  Continue manual wheelchair use and fall precautions

## 2025-05-05 NOTE — ASSESSMENT & PLAN NOTE
Sentec study with transcutaneous C02 monitoring results reviewed, stable  Continue to monitor clinical status and routine labs

## 2025-05-05 NOTE — PROGRESS NOTES
ACUTE VISIT  Location: [facility]  POS: 32 LTC    NAME: Tong Fletcher  AGE: 60 y.o. 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 with hx including but not limited to quadriplegia, depression, bipolar, hypertension, and insomnia seen today for viral infection.    Patient tested positive for rhinovirus on 5/3, known rhinovirus outbreak on unit.  Patient today, seen sitting in wheelchair, awake, alert, NAD.  He does report some congestion and cough however other otherwise reports stable status.  Eating and drinking as usual.  Vital signs stable.  No additional concerns from nursing.        REVIEW OF SYSTEMS:  Complete ROS negative other than as stated in HPI    HISTORY:  Medical Hx: Reviewed, unchanged  Family Hx: Reviewed, unchanged  Soc Hx: Reviewed, unchanged  No Known Allergies    PHYSICAL EXAM:    Vital Signs: /72, temp 97, HR 82, RR 18, O2 98% on room air    General: NAD, sitting in WC  Eye Exam: horizontal nystagmus  Cardiac: regular rate and rhythm  Pulmonary: CTA, unlabored, on RA  Neurological: slurred speech. Awake, alert, NAD. Generalized weakness with poor coordination, mild left sided weakness.  Skin:No over  rashes or lesions  Psych: somewhat flat, pleasant, cooperative    PERTINENT LABS/IMAGING DATA:    4/10 CBC: Hemoglobin 14.2, WBC 4.6, platelet 166    3/12 CBC, CMP: Hemoglobin 12.2, WBC 4.4, platelet 146, glucose 93, BUN 13, creatinine 0.70, sodium 142, potassium 3.7, alk phos 33, albumin 3.3, total protein 5.5, AST 20, ALT 20, EGFR 105    3/9 CBC, CMP: Hemoglobin 13.9, WBC 3.6, platelet 129, glucose 94, BUN 25, creatinine 0.86, sodium 138, potassium 3.5, AST 23, ALT 22, EGFR 99  Valproic acid: 100    3/8 CBC, BMP: Hemoglobin 15.3, WBCs 5.2, platelet 172, glucose 115, BUN 29, creatinine 0.98, sodium 138, potassium 3.6, EGFR 88    12/4 CBC, CMP: Hemoglobin 13.4, WBC 4.9, platelet 172, glucose 81, BUN 28, creatinine 0.78, sodium 141, potassium 4.3, AST 24, ALT 13, EGFR 102    11/21 CBC, CMP: Hemoglobin 12.7, WBC 6.4, platelet 140, glucose 76, BUN 18, creatinine 0.70, sodium 142, potassium 3.8, alk phos 30, albumin 3.4, total bili 0.5, total protein 5.7, AST 13, ALT 14, EGFR 105      CURRENT MEDICATIONS:  All medications reviewed and updated in Nursing Home EMR.    Provider: Alexsander MEDINA  [facility]  Patient: Tong Salinaste   05/05/25

## 2025-05-22 ENCOUNTER — NURSING HOME VISIT (OUTPATIENT)
Dept: GERIATRICS | Facility: OTHER | Age: 60
End: 2025-05-22

## 2025-05-22 DIAGNOSIS — G32.81 CEREBELLAR ATAXIA IN DISEASES CLASSIFIED ELSEWHERE (HCC): Primary | Chronic | ICD-10-CM

## 2025-05-22 DIAGNOSIS — R06.89 HYPERCAPNIA: ICD-10-CM

## 2025-05-22 DIAGNOSIS — D69.6 THROMBOCYTOPENIA (HCC): ICD-10-CM

## 2025-05-22 DIAGNOSIS — F31.63 BIPOLAR 1 DISORDER, MIXED, SEVERE (HCC): Chronic | ICD-10-CM

## 2025-05-22 DIAGNOSIS — G47.59 OTHER PARASOMNIA: ICD-10-CM

## 2025-05-22 DIAGNOSIS — M25.552 ACUTE HIP PAIN, LEFT: ICD-10-CM

## 2025-05-22 DIAGNOSIS — G44.229 CHRONIC TENSION-TYPE HEADACHE, NOT INTRACTABLE: ICD-10-CM

## 2025-05-22 DIAGNOSIS — K59.01 SLOW TRANSIT CONSTIPATION: Chronic | ICD-10-CM

## 2025-05-22 DIAGNOSIS — E78.5 DYSLIPIDEMIA: ICD-10-CM

## 2025-05-22 PROBLEM — B34.8 RHINOVIRUS: Status: RESOLVED | Noted: 2025-05-05 | Resolved: 2025-05-22

## 2025-05-22 RX ORDER — MILK THISTLE 150 MG
1 CAPSULE ORAL EVERY MORNING
COMMUNITY

## 2025-05-22 NOTE — ASSESSMENT & PLAN NOTE
Stable  Likely multifactorial  Chronic diplopia   Continue with therapy, PRN Tylenol, Ibuprofen

## 2025-05-22 NOTE — ASSESSMENT & PLAN NOTE
Stable  Continue chlorpromazine, olanzapine, trazodone, valproic acid, duloxetine, mirtazapine  Continue supportive care as well as monitoring of mood and behaviors

## 2025-05-22 NOTE — ASSESSMENT & PLAN NOTE
Stable  Recent Sentec study with transcutaneous CO2 monitoring was stable  Continue to monitor clinical status and symptoms

## 2025-05-22 NOTE — ASSESSMENT & PLAN NOTE
Acute  Unclear etiology  Assessed by PT  Continue supportive care, as needed Tylenol, ibuprofen

## 2025-05-22 NOTE — ASSESSMENT & PLAN NOTE
Stable  Continue manual wheelchair use as well as therapy services  Supportive care, fall precautions

## 2025-05-22 NOTE — PROGRESS NOTES
MONTHLY VISIT  Location: [facility]  POS: 32 [facility]    NAME: Tong Fletcher  AGE: 60 y.o. 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)  Stable  Continue manual wheelchair use as well as therapy services  Supportive care, fall precautions       Bipolar 1 disorder, mixed, severe (HCC)  Stable  Continue chlorpromazine, olanzapine, trazodone, valproic acid, duloxetine, mirtazapine  Continue supportive care as well as monitoring of mood and behaviors         Hypercapnia  Stable  Recent Sentec study with transcutaneous CO2 monitoring was stable  Continue to monitor clinical status and symptoms       Slow transit constipation  Stable  Continue senna       Other parasomnia  Stable  Continue trazodone, mirtazapine, Zyprexa  Sleep hygiene       Chronic tension-type headache, not intractable  Stable  Likely multifactorial  Chronic diplopia   Continue with therapy, PRN Tylenol, Ibuprofen       Acute hip pain, left  Acute  Unclear etiology  Assessed by PT  Continue supportive care, as needed Tylenol, ibuprofen       Dyslipidemia  Stable   Monitor routine labs and check lipid panel early next month           CHIEF COMPLAINT:    Monthly visit    HISTORY OF PRESENT ILLNESS:  History taken from patient, staff, chart    Tong Fletcher is a 61 y/o male LTC resident of [facility] with hx including but not limited to quadriplegia, depression, bipolar, hypertension, and insomnia seen today for routine monthly visit.    He continues with ongoing supportive care LTC, continues with mid annual wheelchair use.  On today's exam, patient seen sitting in room, awake, alert, NAD.  He reports some acute left hip pain with transfers, denies any trauma, otherwise denies any pain.  He reports stable mood, appetite, sleep.  He also reports stable respiratory, , GI status.  Weight and BMI remain stable.  Nursing also reports stable status.      REVIEW  OF SYSTEMS:  Complete ROS negative other than as stated in HPI    HISTORY:  Medical Hx: Reviewed, unchanged  Family Hx: Reviewed, unchanged  Soc Hx: Reviewed, unchanged  No Known Allergies    PHYSICAL EXAM:    Vital Signs: /74, temp 97.4, HR 62, RR 18, O2 97% on room air  Weight 192.9 pounds, BMI 24.8    General: NAD, sitting in WC  Eye Exam: anicteric sclera, no discharge  Cardiac: regular rate and rhythm  Pulmonary: CTA, unlabored, on RA  GI: soft, non tender, BS+  Neurological: Awake, alert, oriented, dysarthria, ataxia  Skin: No overt rashes or lesions  Psych: somewhat flat, pleasant, cooperative    PERTINENT LABS/IMAGING DATA:    4/10 CBC: Hemoglobin 14.2, WBC 4.6, platelet 166    3/12 CBC, CMP: Hemoglobin 12.2, WBC 4.4, platelet 146, glucose 93, BUN 13, creatinine 0.70, sodium 142, potassium 3.7, alk phos 33, albumin 3.3, total protein 5.5, AST 20, ALT 20, EGFR 105    3/9 CBC, CMP: Hemoglobin 13.9, WBC 3.6, platelet 129, glucose 94, BUN 25, creatinine 0.86, sodium 138, potassium 3.5, AST 23, ALT 22, EGFR 99  Valproic acid: 100      CURRENT MEDICATIONS:  All medications reviewed and updated in Nursing Home EMR.    Provider: Alexsander MEDINA  [facility]  Patient: Tong Fletcher   05/22/25

## 2025-06-16 ENCOUNTER — NURSING HOME VISIT (OUTPATIENT)
Dept: GERIATRICS | Facility: OTHER | Age: 60
End: 2025-06-16
Payer: MEDICARE

## 2025-06-16 DIAGNOSIS — G82.50 QUADRIPARESIS (HCC): Chronic | ICD-10-CM

## 2025-06-16 DIAGNOSIS — F31.63 BIPOLAR 1 DISORDER, MIXED, SEVERE (HCC): Chronic | ICD-10-CM

## 2025-06-16 DIAGNOSIS — K59.01 SLOW TRANSIT CONSTIPATION: Chronic | ICD-10-CM

## 2025-06-16 DIAGNOSIS — G32.81 CEREBELLAR ATAXIA IN DISEASES CLASSIFIED ELSEWHERE (HCC): Primary | Chronic | ICD-10-CM

## 2025-06-16 PROBLEM — D69.6 THROMBOCYTOPENIA (HCC): Status: RESOLVED | Noted: 2024-04-22 | Resolved: 2025-06-16

## 2025-06-16 PROCEDURE — 99309 SBSQ NF CARE MODERATE MDM 30: CPT | Performed by: INTERNAL MEDICINE

## 2025-06-16 NOTE — PROGRESS NOTES
Activity as tolerated   MONTHLY VISIT  Location: [facility]  POS: [facility]    NAME: Tong Fletcher  AGE: 60 y.o. SEX: male    DATE OF ENCOUNTER: 6/16/2025    ASSESSMENT AND PROBLEMS:  1. 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  Continue supportive care.  Secondary to anoxic brain injury  2. Bipolar 1 disorder, mixed, severe (HCC)  Assessment & Plan:  Stable.  Continue chlorpromazine, olanzapine, trazodone, valproic acid, duloxetine, mirtazapine  3. Quadriparesis (HCC)  Assessment & Plan:  Stable.  Continue supportive care  4. Slow transit constipation  Assessment & Plan:  Stable.  Continue senna    CHIEF COMPLAINT:  Monthly Visit    HISTORY OF PRESENT ILLNESS:  History taken from patient.  He reports doing well today without issue.  Nurses reports stable neurovascular status, mood, behaviors.    REVIEW OF SYSTEMS:  Complete ROS negative other than as stated in HPI    HISTORY:  Medical Hx: Reviewed, unchanged  Family Hx: Reviewed, unchanged  Soc Hx: Reviewed, unchanged  No Known Allergies    PHYSICAL EXAM:  Vital Signs: /82, temp 97.2, HR 70, RR 18, O2 98% room air  General: NAD, sitting in wheelchair  Eye Exam: anicteric sclera, no discharge  ENT: moist mucous membranes  Cardiovascular: regular rate, regular rhythm, no murmurs, rubs, or gallops  Pulmonary: no wheeze, no rhonchi  Abdominal: soft, nontender, nondistended, bowel sounds audible  Neurological: Awake, alert, poor coordination of all 4 extremities  Skin: No significant lesions appreciated    PERTINENT LABS/IMAGING DATA:  6/5/2025: Hemoglobin 13.9, WBC 4.0, platelet 170, creatinine 0.83, sodium 142, potassium 3.5, BUN 19, AST 13, ALT 10    Provider: Henry Levine MD MPH  Patient: Tong Fletcher

## 2025-07-02 ENCOUNTER — NURSING HOME VISIT (OUTPATIENT)
Dept: GERIATRICS | Facility: OTHER | Age: 60
End: 2025-07-02
Payer: MEDICARE

## 2025-07-02 DIAGNOSIS — M25.562 ACUTE PAIN OF LEFT KNEE: Primary | ICD-10-CM

## 2025-07-02 PROCEDURE — 99307 SBSQ NF CARE SF MDM 10: CPT

## 2025-07-02 NOTE — ASSESSMENT & PLAN NOTE
Possible strain  No overt abnormality  Continue supportive care  Follow up physical therapy  Monitor symptoms

## 2025-07-02 NOTE — PROGRESS NOTES
ACUTE VISIT  Location: [facility]  POS: 32 [facility]    NAME: Tong Fletcher  AGE: 60 y.o. SEX: male    DATE OF ENCOUNTER: 7/2/2025    Assessment & Plan  Acute pain of left knee  Possible strain  No overt abnormality  Continue supportive care  Follow up physical therapy  Monitor symptoms           CHIEF COMPLAINT:    Pain    HISTORY OF PRESENT ILLNESS:  History taken from patient, staff, chart    Tong Fletcher is a 59 y/o male LT resident of [facility] with hx including but not limited to quadriplegia, depression, bipolar, hypertension, and insomnia seen today for knee pain.    Patient reports acute left knee pain that started within the last day or so.  He reports that the pain feels like a muscle strain, reports that the pain is located below the knee and lateral to shin. Denies any additional acute changes or symptoms. Denies any trauma. He reports sometimes activity exacerbates the pain.  Continues to mobilize in manual wheelchair.  Reports he has a therapy session today.  He otherwise reports stable status.  No additional concerns or nursing.      REVIEW OF SYSTEMS:  Complete ROS negative other than as stated in HPI    HISTORY:  Medical Hx: Reviewed, unchanged  Family Hx: Reviewed, unchanged  Soc Hx: Reviewed, unchanged  No Known Allergies    PHYSICAL EXAM:    Vital Signs: /80, temp 97.3, HR 97, RR 16, O2 98% on room air    General: NAD, sitting in WC  Pulmonary: unlabored, on RA  Neurological: Awake, alert, oriented, dysarthria, ataxia  Skin: No overt rashes or lesions  Psych: somewhat flat, pleasant, cooperative    PERTINENT LABS/IMAGING DATA:    6/5/2025 CBC, CMP: Hemoglobin 13.9, WBC 4.0, platelet 170, glucose 77, BUN 19, creatinine 0.83, sodium 142, potassium 3.5, AST 30, ALT 10, EGFR 99    6/3 lipid panel: Cholesterol 193, triglyceride 122, HDL 45,     4/10 CBC: Hemoglobin 14.2, WBC 4.6, platelet 166    3/12 CBC, CMP: Hemoglobin 12.2, WBC 4.4, platelet 146, glucose 93,  BUN 13, creatinine 0.70, sodium 142, potassium 3.7, alk phos 33, albumin 3.3, total protein 5.5, AST 20, ALT 20, EGFR 105    3/9 CBC, CMP: Hemoglobin 13.9, WBC 3.6, platelet 129, glucose 94, BUN 25, creatinine 0.86, sodium 138, potassium 3.5, AST 23, ALT 22, EGFR 99  Valproic acid: 100      CURRENT MEDICATIONS:  All medications reviewed and updated in Nursing Home EMR.    Provider: Alexsander MEDINA  [facility]  Patient: Tong Fletcher   07/02/25

## 2025-07-23 ENCOUNTER — NURSING HOME VISIT (OUTPATIENT)
Dept: GERIATRICS | Facility: OTHER | Age: 60
End: 2025-07-23
Payer: MEDICARE

## 2025-07-23 DIAGNOSIS — K59.01 SLOW TRANSIT CONSTIPATION: Chronic | ICD-10-CM

## 2025-07-23 DIAGNOSIS — F31.63 BIPOLAR 1 DISORDER, MIXED, SEVERE (HCC): Chronic | ICD-10-CM

## 2025-07-23 DIAGNOSIS — E78.5 DYSLIPIDEMIA: ICD-10-CM

## 2025-07-23 DIAGNOSIS — G32.81 CEREBELLAR ATAXIA IN DISEASES CLASSIFIED ELSEWHERE (HCC): Primary | Chronic | ICD-10-CM

## 2025-07-23 DIAGNOSIS — G47.59 OTHER PARASOMNIA: ICD-10-CM

## 2025-07-23 DIAGNOSIS — G82.50 QUADRIPARESIS (HCC): Chronic | ICD-10-CM

## 2025-07-23 PROBLEM — M25.552 ACUTE HIP PAIN, LEFT: Status: RESOLVED | Noted: 2025-05-22 | Resolved: 2025-07-23

## 2025-07-23 PROBLEM — M25.562 ACUTE PAIN OF LEFT KNEE: Status: RESOLVED | Noted: 2025-07-02 | Resolved: 2025-07-23

## 2025-07-23 PROCEDURE — 99309 SBSQ NF CARE MODERATE MDM 30: CPT

## 2025-07-23 NOTE — ASSESSMENT & PLAN NOTE
Stable   Lipid panel showing increasing LDL  Consider statin therapy  Has f/u cardiology apt next month  Continue to monitor routine labs

## 2025-07-23 NOTE — PROGRESS NOTES
MONTHLY VISIT  Location: [facility]  POS: 32 LTC    NAME: Tong Fletcher  AGE: 60 y.o. 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)  Stable  Continue manual wheelchair use as well as therapy services  Supportive care, fall precautions          Bipolar 1 disorder, mixed, severe (HCC)  Stable  Continue chlorpromazine, olanzapine, trazodone, valproic acid, duloxetine, mirtazapine  Continue supportive care as well as monitoring of mood and behaviors            Dyslipidemia  Stable   Lipid panel showing increasing LDL  Consider statin therapy  Has f/u cardiology apt next month  Continue to monitor routine labs          Quadriparesis (HCC)  Stable  Continue ongoing supportive care LTC  Manual wheelchair use          Slow transit constipation  Stable  Continue senna              CHIEF COMPLAINT:    Monthly visit    HISTORY OF PRESENT ILLNESS:  History taken from patient, staff, chart    Tong Fletcher is a 61 y/o male LTC resident of [facility] with hx including but not limited to quadriplegia, depression, bipolar, hypertension, and insomnia seen today for routine monthly visit.    He continues with ongoing supportive care LTC, continues with manual wheelchair use.  On today's exam, patient seen sitting in room, awake, alert, NAD.    He reports stable status, denies any questions or concerns.   He reports stable mood, appetite, sleep.    He also reports stable respiratory, , GI status.    Weight and BMI remain stable.  Nursing also reports stable status.      REVIEW OF SYSTEMS:  Complete ROS negative other than as stated in HPI    HISTORY:  Medical Hx: Reviewed, unchanged  Family Hx: Reviewed, unchanged  Soc Hx: Reviewed, unchanged  No Known Allergies    PHYSICAL EXAM:    Vital Signs: /80, temp 97.3, HR 97, RR 18, O2 98% on room air  Weight 186.5 pounds, BMI 23.9    General: NAD, sitting in WC  Eye Exam: anicteric  sclera, no discharge  Cardiac: regular rate and rhythm  Pulmonary: CTA, unlabored, on RA  GI: soft, non tender, BS+  Neurological: Awake, alert, oriented, dysarthria, ataxia  Skin: No overt rashes or lesions  Psych: somewhat flat, pleasant, cooperative    PERTINENT LABS/IMAGING DATA:    6/5/2025 CBC, CMP: Hemoglobin 13.9, WBC 4.0, platelet 170, glucose 77, BUN 19, creatinine 0.83, sodium 142, potassium 3.5, AST 30, ALT 10, EGFR 99     6/3 lipid panel: Cholesterol 193, triglyceride 122, HDL 45,     4/10 CBC: Hemoglobin 14.2, WBC 4.6, platelet 166    3/12 CBC, CMP: Hemoglobin 12.2, WBC 4.4, platelet 146, glucose 93, BUN 13, creatinine 0.70, sodium 142, potassium 3.7, alk phos 33, albumin 3.3, total protein 5.5, AST 20, ALT 20, EGFR 105    3/9 CBC, CMP: Hemoglobin 13.9, WBC 3.6, platelet 129, glucose 94, BUN 25, creatinine 0.86, sodium 138, potassium 3.5, AST 23, ALT 22, EGFR 99  Valproic acid: 100      CURRENT MEDICATIONS:  All medications reviewed and updated in Nursing Home EMR.    Provider: Alexsander MEDINA  [facility]  Patient: Tong Fletcher   07/23/25

## 2025-08-05 PROBLEM — R07.2 PRECORDIAL CHEST PAIN: Status: ACTIVE | Noted: 2025-08-05

## 2025-08-05 PROBLEM — I50.32 CHRONIC HEART FAILURE WITH PRESERVED EJECTION FRACTION (HFPEF) (HCC): Status: ACTIVE | Noted: 2025-08-05

## 2025-08-07 ENCOUNTER — OFFICE VISIT (OUTPATIENT)
Dept: CARDIOLOGY CLINIC | Facility: CLINIC | Age: 60
End: 2025-08-07
Payer: MEDICARE

## 2025-08-07 VITALS
BODY MASS INDEX: 25.03 KG/M2 | WEIGHT: 195 LBS | HEART RATE: 80 BPM | DIASTOLIC BLOOD PRESSURE: 70 MMHG | SYSTOLIC BLOOD PRESSURE: 128 MMHG | HEIGHT: 74 IN

## 2025-08-07 DIAGNOSIS — E78.5 DYSLIPIDEMIA: Primary | ICD-10-CM

## 2025-08-07 DIAGNOSIS — I50.32 CHRONIC HEART FAILURE WITH PRESERVED EJECTION FRACTION (HFPEF) (HCC): ICD-10-CM

## 2025-08-07 DIAGNOSIS — R07.2 PRECORDIAL CHEST PAIN: ICD-10-CM

## 2025-08-07 PROCEDURE — 93000 ELECTROCARDIOGRAM COMPLETE: CPT

## 2025-08-07 PROCEDURE — 99214 OFFICE O/P EST MOD 30 MIN: CPT

## 2025-08-07 RX ORDER — CICLOPIROX 1 G/100ML
SHAMPOO TOPICAL
COMMUNITY

## 2025-08-07 RX ORDER — METOPROLOL SUCCINATE 25 MG/1
TABLET, EXTENDED RELEASE ORAL
COMMUNITY

## 2025-08-07 RX ORDER — FLUTICASONE PROPIONATE 50 MCG
SPRAY, SUSPENSION (ML) NASAL
COMMUNITY
Start: 2025-05-05

## 2025-08-07 RX ORDER — ALBUTEROL SULFATE 0.83 MG/ML
SOLUTION RESPIRATORY (INHALATION)
COMMUNITY

## 2025-08-18 ENCOUNTER — NURSING HOME VISIT (OUTPATIENT)
Dept: GERIATRICS | Facility: OTHER | Age: 60
End: 2025-08-18
Payer: MEDICARE

## 2025-08-18 DIAGNOSIS — G82.50 QUADRIPARESIS (HCC): Chronic | ICD-10-CM

## 2025-08-18 DIAGNOSIS — Z86.69 HISTORY OF ANOXIC BRAIN INJURY: Chronic | ICD-10-CM

## 2025-08-18 DIAGNOSIS — F31.63 BIPOLAR 1 DISORDER, MIXED, SEVERE (HCC): Primary | Chronic | ICD-10-CM

## 2025-08-18 DIAGNOSIS — E78.5 DYSLIPIDEMIA: ICD-10-CM

## 2025-08-18 DIAGNOSIS — K59.01 SLOW TRANSIT CONSTIPATION: Chronic | ICD-10-CM

## 2025-08-18 DIAGNOSIS — G32.81 CEREBELLAR ATAXIA IN DISEASES CLASSIFIED ELSEWHERE (HCC): Chronic | ICD-10-CM

## 2025-08-18 PROBLEM — R07.2 PRECORDIAL CHEST PAIN: Status: RESOLVED | Noted: 2025-08-05 | Resolved: 2025-08-18

## 2025-08-18 PROBLEM — I50.32 CHRONIC HEART FAILURE WITH PRESERVED EJECTION FRACTION (HFPEF) (HCC): Status: RESOLVED | Noted: 2025-08-05 | Resolved: 2025-08-18

## 2025-08-18 PROCEDURE — 99309 SBSQ NF CARE MODERATE MDM 30: CPT | Performed by: INTERNAL MEDICINE

## 2025-08-18 RX ORDER — ATORVASTATIN CALCIUM 40 MG/1
40 TABLET, FILM COATED ORAL DAILY
COMMUNITY